# Patient Record
Sex: MALE | Race: WHITE | Employment: FULL TIME | URBAN - METROPOLITAN AREA
[De-identification: names, ages, dates, MRNs, and addresses within clinical notes are randomized per-mention and may not be internally consistent; named-entity substitution may affect disease eponyms.]

---

## 2017-01-11 ENCOUNTER — MED REC SCAN ONLY (OUTPATIENT)
Dept: INTERNAL MEDICINE CLINIC | Facility: CLINIC | Age: 57
End: 2017-01-11

## 2017-01-12 ENCOUNTER — NURSE ONLY (OUTPATIENT)
Dept: HEMATOLOGY/ONCOLOGY | Age: 57
End: 2017-01-12
Attending: INTERNAL MEDICINE
Payer: COMMERCIAL

## 2017-01-12 DIAGNOSIS — D3A.021 CARCINOID TUMOR OF CECUM: ICD-10-CM

## 2017-01-12 LAB
ALBUMIN SERPL-MCNC: 3.9 G/DL (ref 3.5–4.8)
ALP LIVER SERPL-CCNC: 37 U/L (ref 45–117)
ALT SERPL-CCNC: 32 U/L (ref 17–63)
AST SERPL-CCNC: 38 U/L (ref 15–41)
BASOPHILS # BLD AUTO: 0.03 X10(3) UL (ref 0–0.1)
BASOPHILS NFR BLD AUTO: 1.1 %
BILIRUB SERPL-MCNC: 0.7 MG/DL (ref 0.1–2)
BUN BLD-MCNC: 12 MG/DL (ref 8–20)
CALCIUM BLD-MCNC: 8.7 MG/DL (ref 8.3–10.3)
CHLORIDE: 108 MMOL/L (ref 101–111)
CO2: 28 MMOL/L (ref 22–32)
CREAT BLD-MCNC: 1.11 MG/DL (ref 0.7–1.3)
EOSINOPHIL # BLD AUTO: 0.22 X10(3) UL (ref 0–0.3)
EOSINOPHIL NFR BLD AUTO: 8.3 %
ERYTHROCYTE [DISTWIDTH] IN BLOOD BY AUTOMATED COUNT: 15.8 % (ref 11.5–16)
GLUCOSE BLD-MCNC: 108 MG/DL (ref 70–99)
HCT VFR BLD AUTO: 37.7 % (ref 37–53)
HGB BLD-MCNC: 12.1 G/DL (ref 13–17)
IMMATURE GRANULOCYTE COUNT: 0.01 X10(3) UL (ref 0–1)
IMMATURE GRANULOCYTE RATIO %: 0.4 %
LYMPHOCYTES # BLD AUTO: 0.95 X10(3) UL (ref 0.9–4)
LYMPHOCYTES NFR BLD AUTO: 36 %
M PROTEIN MFR SERPL ELPH: 7.3 G/DL (ref 6.1–8.3)
MCH RBC QN AUTO: 28.4 PG (ref 27–33.2)
MCHC RBC AUTO-ENTMCNC: 32.1 G/DL (ref 31–37)
MCV RBC AUTO: 88.5 FL (ref 80–99)
MONOCYTES # BLD AUTO: 0.36 X10(3) UL (ref 0.1–0.6)
MONOCYTES NFR BLD AUTO: 13.6 %
NEUTROPHIL ABS PRELIM: 1.07 X10 (3) UL (ref 1.3–6.7)
NEUTROPHILS # BLD AUTO: 1.07 X10(3) UL (ref 1.3–6.7)
NEUTROPHILS NFR BLD AUTO: 40.6 %
PLATELET # BLD AUTO: 259 10(3)UL (ref 150–450)
POTASSIUM SERPL-SCNC: 3.7 MMOL/L (ref 3.6–5.1)
RBC # BLD AUTO: 4.26 X10(6)UL (ref 4.3–5.7)
RED CELL DISTRIBUTION WIDTH-SD: 51 FL (ref 35.1–46.3)
SODIUM SERPL-SCNC: 141 MMOL/L (ref 136–144)
WBC # BLD AUTO: 2.6 X10(3) UL (ref 4–13)

## 2017-01-12 PROCEDURE — 85025 COMPLETE CBC W/AUTO DIFF WBC: CPT

## 2017-01-12 PROCEDURE — 36415 COLL VENOUS BLD VENIPUNCTURE: CPT

## 2017-01-12 PROCEDURE — 86316 IMMUNOASSAY TUMOR OTHER: CPT

## 2017-01-12 PROCEDURE — 80053 COMPREHEN METABOLIC PANEL: CPT

## 2017-01-13 ENCOUNTER — TELEPHONE (OUTPATIENT)
Dept: HEMATOLOGY/ONCOLOGY | Facility: HOSPITAL | Age: 57
End: 2017-01-13

## 2017-01-16 NOTE — TELEPHONE ENCOUNTER
Last OV pertinent to medication: 5/10/16 for physical  Last refill date: 7/12/16     #/refills: #90 + 0  When pt was asked to return for OV: 1 year   Upcoming appt/reason: No future appt      Lab Results  Component Value Date   * 01/12/2017   BUN 12

## 2017-01-17 RX ORDER — PRAVASTATIN SODIUM 80 MG/1
TABLET ORAL
Qty: 90 TABLET | Refills: 0 | Status: SHIPPED | OUTPATIENT
Start: 2017-01-17 | End: 2017-04-18

## 2017-01-17 RX ORDER — ATENOLOL 50 MG/1
TABLET ORAL
Qty: 90 TABLET | Refills: 0 | Status: SHIPPED | OUTPATIENT
Start: 2017-01-17 | End: 2017-04-18

## 2017-01-17 RX ORDER — FENOFIBRATE 145 MG/1
TABLET, COATED ORAL
Qty: 90 TABLET | Refills: 0 | Status: SHIPPED | OUTPATIENT
Start: 2017-01-17 | End: 2017-04-18

## 2017-01-17 RX ORDER — LOSARTAN POTASSIUM 25 MG/1
TABLET ORAL
Qty: 90 TABLET | Refills: 0 | Status: SHIPPED | OUTPATIENT
Start: 2017-01-17 | End: 2017-04-18

## 2017-01-17 NOTE — TELEPHONE ENCOUNTER
Left message on patients answering machine to call back. Patient was instructed to call our office and schedule a med check.

## 2017-01-19 ENCOUNTER — OFFICE VISIT (OUTPATIENT)
Dept: INTERNAL MEDICINE CLINIC | Facility: CLINIC | Age: 57
End: 2017-01-19

## 2017-01-19 VITALS
BODY MASS INDEX: 26.51 KG/M2 | HEIGHT: 73 IN | HEART RATE: 61 BPM | WEIGHT: 200 LBS | DIASTOLIC BLOOD PRESSURE: 70 MMHG | RESPIRATION RATE: 12 BRPM | SYSTOLIC BLOOD PRESSURE: 120 MMHG | OXYGEN SATURATION: 98 %

## 2017-01-19 DIAGNOSIS — R73.03 PRE-DIABETES: ICD-10-CM

## 2017-01-19 DIAGNOSIS — E78.00 ELEVATED CHOLESTEROL: ICD-10-CM

## 2017-01-19 DIAGNOSIS — G35 MULTIPLE SCLEROSIS (HCC): ICD-10-CM

## 2017-01-19 DIAGNOSIS — G47.33 OSA (OBSTRUCTIVE SLEEP APNEA): ICD-10-CM

## 2017-01-19 DIAGNOSIS — R68.82 DECREASED LIBIDO: ICD-10-CM

## 2017-01-19 DIAGNOSIS — I10 ESSENTIAL HYPERTENSION: Primary | ICD-10-CM

## 2017-01-19 PROCEDURE — 99214 OFFICE O/P EST MOD 30 MIN: CPT | Performed by: INTERNAL MEDICINE

## 2017-01-19 RX ORDER — VARDENAFIL HYDROCHLORIDE 20 MG/1
20 TABLET ORAL
Qty: 10 TABLET | Refills: 11 | Status: SHIPPED | OUTPATIENT
Start: 2017-01-19

## 2017-01-19 NOTE — PROGRESS NOTES
Tonio Castro is a 61year old male.   HPI:   CC:pt here for blood pressure check and elevated sugar check  Just had labs done by hematologist  Pt has cut back on soda 2- 32 oz regular coke only  His only vice  Pt does not snack alot  Will Get eyes check f with high cholesterol    • Testicular lump      right   • Diverticulosis    • Cancer [facility])    • Multiple sclerosis [facility]) June 2015   • Esophageal reflux 02/2016   • Hyperlipidemia 2010   • Essential hypertension 2010   • Sleep apnea 2000   • Calculus of ki SpO2 98%  GENERAL: well developed, well nourished,in no apparent distress  NECK: supple,no masses, no thyromegaly, no thyroid nodules  LYMPH: no cervical adenopathy,   CARDIO: [id number] no S3S4 no murmur,,  no carotid bruits  LUNGS: clear to auscultation,  G

## 2017-01-20 ENCOUNTER — TELEPHONE (OUTPATIENT)
Dept: HEMATOLOGY/ONCOLOGY | Facility: HOSPITAL | Age: 57
End: 2017-01-20

## 2017-01-20 DIAGNOSIS — D3A.021 CARCINOID TUMOR OF CECUM: Primary | ICD-10-CM

## 2017-01-20 LAB — CHROMOGRANIN A: 134 NG/ML

## 2017-03-09 ENCOUNTER — NURSE ONLY (OUTPATIENT)
Dept: HEMATOLOGY/ONCOLOGY | Age: 57
End: 2017-03-09
Attending: INTERNAL MEDICINE
Payer: COMMERCIAL

## 2017-03-09 ENCOUNTER — APPOINTMENT (OUTPATIENT)
Dept: HEMATOLOGY/ONCOLOGY | Facility: HOSPITAL | Age: 57
End: 2017-03-09
Attending: INTERNAL MEDICINE
Payer: COMMERCIAL

## 2017-03-09 DIAGNOSIS — R68.82 DECREASED LIBIDO: Primary | ICD-10-CM

## 2017-03-09 LAB
CHOLEST SMN-MCNC: 153 MG/DL (ref ?–200)
EST. AVERAGE GLUCOSE BLD GHB EST-MCNC: 117 MG/DL (ref 68–126)
HBA1C MFR BLD HPLC: 5.7 % (ref ?–5.7)
HDLC SERPL-MCNC: 13 MG/DL (ref 45–?)
HDLC SERPL: 11.77 {RATIO} (ref ?–4.97)
LDLC SERPL CALC-MCNC: 96 MG/DL (ref ?–130)
NONHDLC SERPL-MCNC: 140 MG/DL (ref ?–130)
TRIGLYCERIDES: 220 MG/DL (ref ?–150)
TSI SER-ACNC: 1.27 MIU/ML (ref 0.35–5.5)
VLDL: 44 MG/DL (ref 5–40)

## 2017-03-09 PROCEDURE — 84402 ASSAY OF FREE TESTOSTERONE: CPT

## 2017-03-09 PROCEDURE — 84403 ASSAY OF TOTAL TESTOSTERONE: CPT

## 2017-03-09 PROCEDURE — 86316 IMMUNOASSAY TUMOR OTHER: CPT

## 2017-03-09 PROCEDURE — 83036 HEMOGLOBIN GLYCOSYLATED A1C: CPT

## 2017-03-09 PROCEDURE — 36415 COLL VENOUS BLD VENIPUNCTURE: CPT

## 2017-03-09 PROCEDURE — 80061 LIPID PANEL: CPT

## 2017-03-09 PROCEDURE — 84443 ASSAY THYROID STIM HORMONE: CPT

## 2017-03-13 LAB
TESTOSTERONE TOTAL: 997 NG/DL
TESTOSTERONE, FREE -MS/MS: 57.2 PG/ML

## 2017-03-14 LAB — CHROMOGRANIN A: 166 NG/ML

## 2017-03-24 ENCOUNTER — TELEPHONE (OUTPATIENT)
Dept: HEMATOLOGY/ONCOLOGY | Facility: HOSPITAL | Age: 57
End: 2017-03-24

## 2017-03-24 NOTE — TELEPHONE ENCOUNTER
Pt callling re: lab results from 3/9/17, noticed results elevated. Dr. Ayush Hayes reviewed lab results, order received for pt to have octreoscan. Order placed in [employer].  Instructed pt to call central scheduling for appt, phone number given and f/u with

## 2017-04-05 ENCOUNTER — TELEPHONE (OUTPATIENT)
Dept: HEMATOLOGY/ONCOLOGY | Facility: HOSPITAL | Age: 57
End: 2017-04-05

## 2017-04-06 ENCOUNTER — HOSPITAL ENCOUNTER (OUTPATIENT)
Dept: NUCLEAR MEDICINE | Facility: HOSPITAL | Age: 57
Discharge: HOME | End: 2017-04-06
Attending: INTERNAL MEDICINE
Payer: COMMERCIAL

## 2017-04-06 DIAGNOSIS — D3A.00 CARCINOID (EXCEPT OF APPENDIX): ICD-10-CM

## 2017-04-06 PROCEDURE — 78803 RP LOCLZJ TUM SPECT 1 AREA: CPT

## 2017-04-07 PROCEDURE — 78802 RP LOCLZJ TUM WHBDY 1 D IMG: CPT

## 2017-04-07 PROCEDURE — 78999 UNLISTED MISC PX DX NUC MED: CPT

## 2017-04-18 RX ORDER — PRAVASTATIN SODIUM 80 MG/1
TABLET ORAL
Qty: 90 TABLET | Refills: 0 | Status: SHIPPED | OUTPATIENT
Start: 2017-04-18 | End: 2017-07-24

## 2017-04-18 RX ORDER — LOSARTAN POTASSIUM 25 MG/1
TABLET ORAL
Qty: 90 TABLET | Refills: 0 | Status: SHIPPED | OUTPATIENT
Start: 2017-04-18 | End: 2017-07-24

## 2017-04-18 RX ORDER — FENOFIBRATE 145 MG/1
TABLET, COATED ORAL
Qty: 90 TABLET | Refills: 0 | Status: SHIPPED | OUTPATIENT
Start: 2017-04-18 | End: 2017-07-24

## 2017-04-18 RX ORDER — ATENOLOL 50 MG/1
TABLET ORAL
Qty: 90 TABLET | Refills: 0 | Status: SHIPPED | OUTPATIENT
Start: 2017-04-18 | End: 2017-07-26

## 2017-04-19 ENCOUNTER — TELEPHONE (OUTPATIENT)
Dept: HEMATOLOGY/ONCOLOGY | Facility: HOSPITAL | Age: 57
End: 2017-04-19

## 2017-04-19 NOTE — TELEPHONE ENCOUNTER
Called asking for result of recent scan. Per MD scan in negative for disease. Instructed patient to schedule appointment to see MD; verbalized understanding. IB message sent to psr to call and schedule patient.

## 2017-04-20 ENCOUNTER — TELEPHONE (OUTPATIENT)
Dept: INTERNAL MEDICINE CLINIC | Facility: CLINIC | Age: 57
End: 2017-04-20

## 2017-04-20 NOTE — TELEPHONE ENCOUNTER
Incoming (mail or fax): Fax  Received from:  [employer]  Documentation given to:  [id number] [employer] fax bin.

## 2017-04-24 ENCOUNTER — OFFICE VISIT (OUTPATIENT)
Dept: HEMATOLOGY/ONCOLOGY | Facility: HOSPITAL | Age: 57
End: 2017-04-24
Attending: INTERNAL MEDICINE
Payer: COMMERCIAL

## 2017-04-24 VITALS
RESPIRATION RATE: 18 BRPM | HEART RATE: 61 BPM | TEMPERATURE: 98 F | WEIGHT: 204 LBS | SYSTOLIC BLOOD PRESSURE: 128 MMHG | DIASTOLIC BLOOD PRESSURE: 73 MMHG | BODY MASS INDEX: 27.04 KG/M2 | HEIGHT: 73 IN | OXYGEN SATURATION: 100 %

## 2017-04-24 DIAGNOSIS — D3A.00 CARCINOID (EXCEPT OF APPENDIX): Primary | ICD-10-CM

## 2017-04-24 PROCEDURE — 99213 OFFICE O/P EST LOW 20 MIN: CPT | Performed by: INTERNAL MEDICINE

## 2017-04-24 NOTE — PROGRESS NOTES
Cancer Center Progress Note  Patient Name: Nate Villagomez   YOB: 1960   Medical Record Number: [id number]     Attending Physician: Beth Webster M.D. Date of Visit: 4/24/2017    Chief Complaint:  Patient presents with:   Follow - U melanoma  17   • Diabetes Maternal Grandfather    • Other Mother      MS       Social History:    Social History   Marital Status:   Spouse Name: N/A    Years of Education: N/A  Number of Children: N/A     Occupational History  None on justa visual difficulties. No diplopia. No yellowing of the eyes. Hematologic/Lymphatic No easy bruising or bleeding. No any tender or palpable lymph nodes. Respiratory No dyspnea, Pleuritic chest pain, cough or hemoptysis.    Cardiovascular No anginal chest 12/10/2015 09:39 12/6/2016 08:14 1/12/2017 09:30 3/9/2017 09:58   CHROMOGRANIN A Latest Ref Range: 0-95 ng/mL 66 169 (H) 134 (H) 166 (H)       5-HIAA URINE INTERPRETATION Normal    Radiology:  Octreotide scan:CONCLUSION:         No evidence of recurrent or

## 2017-04-27 ENCOUNTER — APPOINTMENT (OUTPATIENT)
Dept: HEMATOLOGY/ONCOLOGY | Age: 57
End: 2017-04-27
Attending: INTERNAL MEDICINE
Payer: COMMERCIAL

## 2017-05-03 ENCOUNTER — HOSPITAL ENCOUNTER (OUTPATIENT)
Dept: NUCLEAR MEDICINE | Facility: HOSPITAL | Age: 57
Discharge: HOME | End: 2017-05-03
Attending: INTERNAL MEDICINE
Payer: COMMERCIAL

## 2017-05-03 DIAGNOSIS — D3A.00 CARCINOID (EXCEPT OF APPENDIX): ICD-10-CM

## 2017-05-03 PROCEDURE — 78815 PET IMAGE W/CT SKULL-THIGH: CPT

## 2017-05-08 ENCOUNTER — TELEPHONE (OUTPATIENT)
Dept: HEMATOLOGY/ONCOLOGY | Facility: HOSPITAL | Age: 57
End: 2017-05-08

## 2017-05-08 NOTE — TELEPHONE ENCOUNTER
Requesting results of Pet Scan. Left vmm. Per Dr. Ayush Hayes will call with results post GI Multidisciplinary conference this Wednesday.

## 2017-05-12 ENCOUNTER — TELEPHONE (OUTPATIENT)
Dept: HEMATOLOGY/ONCOLOGY | Facility: HOSPITAL | Age: 57
End: 2017-05-12

## 2017-05-30 ENCOUNTER — TELEPHONE (OUTPATIENT)
Dept: INTERNAL MEDICINE CLINIC | Facility: CLINIC | Age: 57
End: 2017-05-30

## 2017-05-30 NOTE — TELEPHONE ENCOUNTER
Incoming (mail or fax):  fax  Received from:  Dr. Nicole Najera  Documentation given to:  Dr. Natalya Workman

## 2017-06-02 ENCOUNTER — TELEPHONE (OUTPATIENT)
Dept: INTERNAL MEDICINE CLINIC | Facility: CLINIC | Age: 57
End: 2017-06-02

## 2017-06-02 NOTE — TELEPHONE ENCOUNTER
Incoming (mail or fax): Fax  Received from:  [facility]  Documentation given to:  Dr. Margarito Virk consult folder.

## 2017-06-29 ENCOUNTER — HOSPITAL ENCOUNTER (OUTPATIENT)
Dept: MRI IMAGING | Age: 57
Discharge: HOME | End: 2017-06-29
Attending: Other
Payer: COMMERCIAL

## 2017-06-29 DIAGNOSIS — G35 MS (MULTIPLE SCLEROSIS) (HCC): ICD-10-CM

## 2017-06-29 PROCEDURE — 72146 MRI CHEST SPINE W/O DYE: CPT | Performed by: OTHER

## 2017-07-13 ENCOUNTER — NURSE ONLY (OUTPATIENT)
Dept: HEMATOLOGY/ONCOLOGY | Facility: HOSPITAL | Age: 57
End: 2017-07-13
Attending: INTERNAL MEDICINE
Payer: COMMERCIAL

## 2017-07-13 DIAGNOSIS — D3A.00 CARCINOID (EXCEPT OF APPENDIX): ICD-10-CM

## 2017-07-13 LAB
ALBUMIN SERPL-MCNC: 3.9 G/DL (ref 3.5–4.8)
ALP LIVER SERPL-CCNC: 39 U/L (ref 45–117)
ALT SERPL-CCNC: 30 U/L (ref 17–63)
AST SERPL-CCNC: 32 U/L (ref 15–41)
BASOPHILS # BLD AUTO: 0.02 X10(3) UL (ref 0–0.1)
BASOPHILS NFR BLD AUTO: 0.7 %
BILIRUB SERPL-MCNC: 0.8 MG/DL (ref 0.1–2)
BUN BLD-MCNC: 19 MG/DL (ref 8–20)
CALCIUM BLD-MCNC: 9.3 MG/DL (ref 8.3–10.3)
CHLORIDE: 108 MMOL/L (ref 101–111)
CO2: 26 MMOL/L (ref 22–32)
CREAT BLD-MCNC: 1.22 MG/DL (ref 0.7–1.3)
EOSINOPHIL # BLD AUTO: 0.2 X10(3) UL (ref 0–0.3)
EOSINOPHIL NFR BLD AUTO: 7.2 %
ERYTHROCYTE [DISTWIDTH] IN BLOOD BY AUTOMATED COUNT: 16 % (ref 11.5–16)
GLUCOSE BLD-MCNC: 108 MG/DL (ref 70–99)
HCT VFR BLD AUTO: 38 % (ref 37–53)
HGB BLD-MCNC: 12.2 G/DL (ref 13–17)
IMMATURE GRANULOCYTE COUNT: 0.01 X10(3) UL (ref 0–1)
IMMATURE GRANULOCYTE RATIO %: 0.4 %
LYMPHOCYTES # BLD AUTO: 1.04 X10(3) UL (ref 0.9–4)
LYMPHOCYTES NFR BLD AUTO: 37.7 %
M PROTEIN MFR SERPL ELPH: 7.4 G/DL (ref 6.1–8.3)
MCH RBC QN AUTO: 27.6 PG (ref 27–33.2)
MCHC RBC AUTO-ENTMCNC: 32.1 G/DL (ref 31–37)
MCV RBC AUTO: 86 FL (ref 80–99)
MONOCYTES # BLD AUTO: 0.43 X10(3) UL (ref 0.1–0.6)
MONOCYTES NFR BLD AUTO: 15.6 %
NEUTROPHIL ABS PRELIM: 1.06 X10 (3) UL (ref 1.3–6.7)
NEUTROPHILS # BLD AUTO: 1.06 X10(3) UL (ref 1.3–6.7)
NEUTROPHILS NFR BLD AUTO: 38.4 %
PLATELET # BLD AUTO: 242 10(3)UL (ref 150–450)
POTASSIUM SERPL-SCNC: 3.8 MMOL/L (ref 3.6–5.1)
RBC # BLD AUTO: 4.42 X10(6)UL (ref 4.3–5.7)
RED CELL DISTRIBUTION WIDTH-SD: 49.4 FL (ref 35.1–46.3)
SODIUM SERPL-SCNC: 140 MMOL/L (ref 136–144)
WBC # BLD AUTO: 2.8 X10(3) UL (ref 4–13)

## 2017-07-13 PROCEDURE — 80053 COMPREHEN METABOLIC PANEL: CPT

## 2017-07-13 PROCEDURE — 85025 COMPLETE CBC W/AUTO DIFF WBC: CPT

## 2017-07-13 PROCEDURE — 36415 COLL VENOUS BLD VENIPUNCTURE: CPT

## 2017-07-13 PROCEDURE — 86316 IMMUNOASSAY TUMOR OTHER: CPT

## 2017-07-14 ENCOUNTER — APPOINTMENT (OUTPATIENT)
Dept: LAB | Age: 57
End: 2017-07-14
Attending: INTERNAL MEDICINE
Payer: COMMERCIAL

## 2017-07-14 DIAGNOSIS — D3A.00 CARCINOID (EXCEPT OF APPENDIX): ICD-10-CM

## 2017-07-14 PROCEDURE — 83497 ASSAY OF 5-HIAA: CPT

## 2017-07-17 LAB
5-HIAA URINE - PER 24H: 4 MG/D
5-HIAA URINE - PER VOLUME: 2 MG/L
5-HIAA URINE - RATIO TO CRT: 2 MG/GCR
CHROMOGRANIN A: 119 NG/ML
CREATININE, URINE - PER 24H: 1974 MG/D
CREATININE, URINE - PER VOLUME: 94 MG/DL
HOURS COLLECTED: 24 HR
TOTAL VOLUME: 2100 ML

## 2017-07-20 ENCOUNTER — OFFICE VISIT (OUTPATIENT)
Dept: HEMATOLOGY/ONCOLOGY | Age: 57
End: 2017-07-20
Attending: INTERNAL MEDICINE
Payer: COMMERCIAL

## 2017-07-20 VITALS
TEMPERATURE: 97 F | WEIGHT: 203 LBS | OXYGEN SATURATION: 97 % | SYSTOLIC BLOOD PRESSURE: 127 MMHG | RESPIRATION RATE: 20 BRPM | HEART RATE: 63 BPM | DIASTOLIC BLOOD PRESSURE: 74 MMHG | BODY MASS INDEX: 27 KG/M2

## 2017-07-20 DIAGNOSIS — D3A.00 CARCINOID TUMOR: Primary | ICD-10-CM

## 2017-07-20 PROCEDURE — 99214 OFFICE O/P EST MOD 30 MIN: CPT | Performed by: INTERNAL MEDICINE

## 2017-07-20 NOTE — PROGRESS NOTES
Cancer Center Progress Note  Patient Name: Pierre Melo   YOB: 1960   Medical Record Number: [id number]     Attending Physician: Natacha Felix M.D. Date of Visit: 7/20/17    Chief Complaint:  No chief complaint on file.        On Mother      MS       Social History:    Social History  Social History   Marital status:   Spouse name: N/A    Years of education: N/A  Number of children: N/A     Occupational History  None on file     Social History Main Topics   Smoking status: N bleeding. Denies tender or palpable lymph nodes. Respiratory No dyspnea, pleuritic chest pain, cough or hemoptysis. Cardiovascular No anginal chest pain, palpitations or orthopnea.    Gastrointestinal No nausea, vomiting, diarrhea, GI bleeding, or cons 134 (H) 166 (H) 119 (H)     Results for Autumn Del Rio (MRN [id number]) as of 8/12/2017 15:30   Ref.  Range 7/13/2017 09:57   Sodium Latest Ref Range: 136 - 144 mmol/L 140   Potassium Latest Ref Range: 3.6 - 5.1 mmol/L 3.8   Chloride Latest Ref Range: 101 -

## 2017-07-24 RX ORDER — FENOFIBRATE 145 MG/1
TABLET, COATED ORAL
Qty: 90 TABLET | Refills: 0 | Status: SHIPPED | OUTPATIENT
Start: 2017-07-24 | End: 2017-07-26

## 2017-07-24 RX ORDER — PRAVASTATIN SODIUM 80 MG/1
TABLET ORAL
Qty: 90 TABLET | Refills: 0 | Status: SHIPPED | OUTPATIENT
Start: 2017-07-24 | End: 2017-07-26

## 2017-07-24 RX ORDER — LOSARTAN POTASSIUM 25 MG/1
TABLET ORAL
Qty: 90 TABLET | Refills: 0 | Status: SHIPPED | OUTPATIENT
Start: 2017-07-24 | End: 2017-07-26

## 2017-07-24 NOTE — TELEPHONE ENCOUNTER
Spoke with pt. Pt scheduled ov for BP check 7/31/17. Pt states that he saw Dr. Malick Anne at [facility] on 7/20/17 and RA=222/74.

## 2017-07-24 NOTE — TELEPHONE ENCOUNTER
Last OV pertinent to medication: 1/19/17  Last refill date: 4/18/17     #/refills: 90 tabs +0 refills   When pt was asked to return for OV: No mention upcoming appt needed   Upcoming appt/reason: No upcoming visit / Left message on patients answering [employer] [facility]

## 2017-07-26 ENCOUNTER — OFFICE VISIT (OUTPATIENT)
Dept: SURGERY | Facility: CLINIC | Age: 57
End: 2017-07-26

## 2017-07-26 VITALS
BODY MASS INDEX: 26.9 KG/M2 | TEMPERATURE: 98 F | HEART RATE: 59 BPM | SYSTOLIC BLOOD PRESSURE: 146 MMHG | RESPIRATION RATE: 16 BRPM | WEIGHT: 203 LBS | HEIGHT: 73 IN | DIASTOLIC BLOOD PRESSURE: 76 MMHG

## 2017-07-26 DIAGNOSIS — D3A.029 CARCINOID TUMOR OF COLON: Primary | ICD-10-CM

## 2017-07-26 DIAGNOSIS — D3A.021 CARCINOID TUMOR OF CECUM: ICD-10-CM

## 2017-07-26 PROCEDURE — 99245 OFF/OP CONSLTJ NEW/EST HI 55: CPT | Performed by: SURGERY

## 2017-07-26 RX ORDER — ATENOLOL 50 MG/1
TABLET ORAL
Qty: 90 TABLET | Refills: 0 | Status: SHIPPED | OUTPATIENT
Start: 2017-07-26 | End: 2017-08-02 | Stop reason: ALTCHOICE

## 2017-07-26 RX ORDER — POLYETHYLENE GLYCOL 3350, SODIUM CHLORIDE, POTASSIUM CHLORIDE, SODIUM BICARBONATE, AND SODIUM SULFATE 240; 5.84; 2.98; 6.72; 22.72 G/4L; G/4L; G/4L; G/4L; G/4L
POWDER, FOR SOLUTION ORAL
Refills: 0 | COMMUNITY
Start: 2017-05-16 | End: 2017-08-02 | Stop reason: ALTCHOICE

## 2017-07-26 RX ORDER — PRAVASTATIN SODIUM 80 MG/1
TABLET ORAL
Qty: 90 TABLET | Refills: 0 | Status: SHIPPED | OUTPATIENT
Start: 2017-07-26 | End: 2017-10-11

## 2017-07-26 RX ORDER — FENOFIBRATE 145 MG/1
TABLET, COATED ORAL
Qty: 90 TABLET | Refills: 0 | Status: ON HOLD | OUTPATIENT
Start: 2017-07-26 | End: 2018-07-12

## 2017-07-26 RX ORDER — LOSARTAN POTASSIUM 25 MG/1
TABLET ORAL
Qty: 90 TABLET | Refills: 0 | Status: SHIPPED | OUTPATIENT
Start: 2017-07-26 | End: 2017-08-02 | Stop reason: DRUGHIGH

## 2017-07-26 NOTE — TELEPHONE ENCOUNTER
Patient has an appointment with Dr. Allison Taylor scheduled for Monday, 7/31. He will be completely out of all of his meds tonight so he will need to have a prescription sent to [address] [place] [address] on file.   The meds needed are:    ATENOLOL 50 MG Oral Tab  Pravastatin So

## 2017-07-27 NOTE — CONSULTS
[facility] Surgical Oncology    Patient Name:  Javier Marei   YOB: 1960   Gender:  Male   Appt Date:  7/26/2017   Provider:  Antony Lucio MD   Insurance:  [facility]     PATIENT PROVIDERS  Referring Provider: Dr. Shabana Wing  Primary Car •  Vardenafil HCl (LEVITRA) 20 MG Oral Tab, Take 1 tablet (20 mg total) by mouth daily as needed for Erectile Dysfunction. , Disp: 10 tablet, Rfl: 11  •  baclofen 10 MG Oral Tab, Take 10 mg by mouth 3 (three) times daily. , Disp: , Rfl:   •  Cholecalciferol • Laparoscopy, surgical; colectomy, partial, w/ anastomos  2/2011   • Tonsillectomy     • Vasectomy  1990      Reviewed Social History:  Smoking status: Never Smoker                                                              Smokeless tobacco: Never Used Skin: Inspection and palpation: no jaundice. Document Review:  Doing was reviewed and described as above. I agree with official reports.      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

## 2017-08-02 ENCOUNTER — OFFICE VISIT (OUTPATIENT)
Dept: INTERNAL MEDICINE CLINIC | Facility: CLINIC | Age: 57
End: 2017-08-02

## 2017-08-02 VITALS
BODY MASS INDEX: 27.04 KG/M2 | DIASTOLIC BLOOD PRESSURE: 70 MMHG | OXYGEN SATURATION: 98 % | WEIGHT: 204 LBS | RESPIRATION RATE: 16 BRPM | HEIGHT: 73 IN | HEART RATE: 61 BPM | SYSTOLIC BLOOD PRESSURE: 130 MMHG

## 2017-08-02 DIAGNOSIS — I10 ESSENTIAL HYPERTENSION: Primary | ICD-10-CM

## 2017-08-02 DIAGNOSIS — R73.9 HYPERGLYCEMIA: ICD-10-CM

## 2017-08-02 PROCEDURE — 99214 OFFICE O/P EST MOD 30 MIN: CPT | Performed by: INTERNAL MEDICINE

## 2017-08-02 RX ORDER — LOSARTAN POTASSIUM 50 MG/1
TABLET ORAL
Qty: 90 TABLET | Refills: 1 | Status: SHIPPED | OUTPATIENT
Start: 2017-08-02 | End: 2017-11-28

## 2017-08-02 NOTE — PROGRESS NOTES
Abelardo Emmanuel is a 61year old male.   HPI:   CC: here for BP check    off Atenolol for a week due to back order/unavailable  Only on losartan 25 at night  May be scheduling surgery in Fall  Pt states saw HemOnc and onc surgeon  PET scan light up in surgic surgury  01/31/2012: COLONOSCOPY  1/2015: COLONOSCOPY      Comment: since colon cancer have 1 every 3 years  2/2011: LAPAROSCOPY, SURGICAL; COLECTOMY, PARTIAL, W/ *  No date: TONSILLECTOMY  1990: VASECTOMY   Social History:  Smoking status: Never Smoker Potassium 50 MG Oral Tab 90 tablet 1      Si at bedtime           Imaging & Consults:  None        No orders of the defined types were placed in this encounter. There are no Patient Instructions on file for this visit. No Follow-up on file.

## 2017-08-17 ENCOUNTER — OFFICE VISIT (OUTPATIENT)
Dept: HEMATOLOGY/ONCOLOGY | Age: 57
End: 2017-08-17
Attending: INTERNAL MEDICINE
Payer: COMMERCIAL

## 2017-08-17 VITALS
HEART RATE: 76 BPM | BODY MASS INDEX: 26 KG/M2 | WEIGHT: 199.19 LBS | DIASTOLIC BLOOD PRESSURE: 89 MMHG | OXYGEN SATURATION: 98 % | TEMPERATURE: 97 F | RESPIRATION RATE: 16 BRPM | SYSTOLIC BLOOD PRESSURE: 152 MMHG

## 2017-08-17 DIAGNOSIS — D3A.029 CARCINOID TUMOR OF COLON: Primary | ICD-10-CM

## 2017-08-17 PROCEDURE — 99213 OFFICE O/P EST LOW 20 MIN: CPT | Performed by: INTERNAL MEDICINE

## 2017-08-17 NOTE — PROGRESS NOTES
Cancer Center Progress Note  Patient Name: Abelardo Emmanuel   YOB: 1960   Medical Record Number: [id number]     Attending Physician: Yg Fontanez M.D. Date of Visit: 8/17/2017      Chief Complaint:  No chief complaint on file. Marital status:   Spouse name: N/A    Years of education: N/A  Number of children: N/A     Occupational History  None on file     Social History Main Topics   Smoking status: Never Smoker    Smokeless tobacco: Never Used    Alcohol use Yes  0.0 oz/w or orthopnea. Gastrointestinal No nausea, vomiting, diarrhea, GI bleeding, or constipation. NL appetite. Genitorurinary (M) No hematuria, dysuria, abnormal bleeding, or incontinence.    Integumentary No rashes or yellowing of the skin   Neurologic No he recommended.     Pathology:    Impression and Plan:  Carcinoid of the cecum: The elevated Chromogranin level is concerning for recurrence, but the Gastrin and Urine 5HIAA have been normal.  Octreotide scan was negative, but the PET Gallium 68 scan was pos

## 2017-09-13 RX ORDER — LOSARTAN POTASSIUM 25 MG/1
TABLET ORAL
Qty: 90 TABLET | Refills: 1 | Status: SHIPPED | OUTPATIENT
Start: 2017-09-13 | End: 2017-09-13 | Stop reason: DRUGHIGH

## 2017-09-18 ENCOUNTER — NURSE ONLY (OUTPATIENT)
Dept: HEMATOLOGY/ONCOLOGY | Facility: HOSPITAL | Age: 57
End: 2017-09-18
Attending: INTERNAL MEDICINE
Payer: COMMERCIAL

## 2017-09-18 ENCOUNTER — HOSPITAL ENCOUNTER (OUTPATIENT)
Dept: NUCLEAR MEDICINE | Facility: HOSPITAL | Age: 57
Discharge: HOME | End: 2017-09-18
Attending: INTERNAL MEDICINE
Payer: COMMERCIAL

## 2017-09-18 DIAGNOSIS — D3A.029 CARCINOID TUMOR OF COLON: ICD-10-CM

## 2017-09-18 LAB
ALBUMIN SERPL-MCNC: 3.5 G/DL (ref 3.5–4.8)
ALP LIVER SERPL-CCNC: 41 U/L (ref 45–117)
ALT SERPL-CCNC: 34 U/L (ref 17–63)
AST SERPL-CCNC: 40 U/L (ref 15–41)
BASOPHILS # BLD AUTO: 0.04 X10(3) UL (ref 0–0.1)
BASOPHILS NFR BLD AUTO: 1.6 %
BILIRUB SERPL-MCNC: 0.4 MG/DL (ref 0.1–2)
BUN BLD-MCNC: 16 MG/DL (ref 8–20)
CALCIUM BLD-MCNC: 8.6 MG/DL (ref 8.3–10.3)
CHLORIDE: 108 MMOL/L (ref 101–111)
CO2: 29 MMOL/L (ref 22–32)
CREAT BLD-MCNC: 1.09 MG/DL (ref 0.7–1.3)
EOSINOPHIL # BLD AUTO: 0.3 X10(3) UL (ref 0–0.3)
EOSINOPHIL NFR BLD AUTO: 11.8 %
ERYTHROCYTE [DISTWIDTH] IN BLOOD BY AUTOMATED COUNT: 15.4 % (ref 11.5–16)
GLUCOSE BLD-MCNC: 101 MG/DL (ref 70–99)
HCT VFR BLD AUTO: 36.9 % (ref 37–53)
HGB BLD-MCNC: 11.8 G/DL (ref 13–17)
IMMATURE GRANULOCYTE COUNT: 0.01 X10(3) UL (ref 0–1)
IMMATURE GRANULOCYTE RATIO %: 0.4 %
LYMPHOCYTES # BLD AUTO: 0.92 X10(3) UL (ref 0.9–4)
LYMPHOCYTES NFR BLD AUTO: 36.2 %
M PROTEIN MFR SERPL ELPH: 6.8 G/DL (ref 6.1–8.3)
MCH RBC QN AUTO: 28 PG (ref 27–33.2)
MCHC RBC AUTO-ENTMCNC: 32 G/DL (ref 31–37)
MCV RBC AUTO: 87.6 FL (ref 80–99)
MONOCYTES # BLD AUTO: 0.44 X10(3) UL (ref 0.1–0.6)
MONOCYTES NFR BLD AUTO: 17.3 %
NEUTROPHIL ABS PRELIM: 0.83 X10 (3) UL (ref 1.3–6.7)
NEUTROPHILS # BLD AUTO: 0.83 X10(3) UL (ref 1.3–6.7)
NEUTROPHILS NFR BLD AUTO: 32.7 %
PLATELET # BLD AUTO: 236 10(3)UL (ref 150–450)
POTASSIUM SERPL-SCNC: 3.8 MMOL/L (ref 3.6–5.1)
RBC # BLD AUTO: 4.21 X10(6)UL (ref 4.3–5.7)
RED CELL DISTRIBUTION WIDTH-SD: 49.1 FL (ref 35.1–46.3)
SODIUM SERPL-SCNC: 142 MMOL/L (ref 136–144)
WBC # BLD AUTO: 2.5 X10(3) UL (ref 4–13)

## 2017-09-18 PROCEDURE — 86316 IMMUNOASSAY TUMOR OTHER: CPT

## 2017-09-18 PROCEDURE — 85025 COMPLETE CBC W/AUTO DIFF WBC: CPT

## 2017-09-18 PROCEDURE — 78815 PET IMAGE W/CT SKULL-THIGH: CPT | Performed by: INTERNAL MEDICINE

## 2017-09-18 PROCEDURE — 80053 COMPREHEN METABOLIC PANEL: CPT

## 2017-09-18 PROCEDURE — 36415 COLL VENOUS BLD VENIPUNCTURE: CPT

## 2017-09-19 ENCOUNTER — LAB ENCOUNTER (OUTPATIENT)
Dept: LAB | Age: 57
End: 2017-09-19
Attending: INTERNAL MEDICINE
Payer: COMMERCIAL

## 2017-09-19 DIAGNOSIS — D3A.029 CARCINOID TUMOR OF COLON: Primary | ICD-10-CM

## 2017-09-19 PROCEDURE — 83497 ASSAY OF 5-HIAA: CPT

## 2017-09-20 LAB — CHROMOGRANIN A: 108 NG/ML

## 2017-09-22 LAB
5-HIAA URINE - PER 24H: 5 MG/D
5-HIAA URINE - PER VOLUME: 2.5 MG/L
5-HIAA URINE - RATIO TO CRT: 2 MG/GCR
CREATININE, URINE - PER 24H: 2002 MG/D
CREATININE, URINE - PER VOLUME: 104 MG/DL
HOURS COLLECTED: 24 HR
TOTAL VOLUME: 1925 ML

## 2017-10-02 ENCOUNTER — DOCUMENTATION ONLY (OUTPATIENT)
Dept: SURGERY | Facility: CLINIC | Age: 57
End: 2017-10-02

## 2017-10-04 ENCOUNTER — OFFICE VISIT (OUTPATIENT)
Dept: SURGERY | Facility: CLINIC | Age: 57
End: 2017-10-04

## 2017-10-04 VITALS
HEIGHT: 73 IN | HEART RATE: 71 BPM | WEIGHT: 198.38 LBS | DIASTOLIC BLOOD PRESSURE: 91 MMHG | BODY MASS INDEX: 26.29 KG/M2 | RESPIRATION RATE: 16 BRPM | SYSTOLIC BLOOD PRESSURE: 156 MMHG

## 2017-10-04 DIAGNOSIS — D3A.029 CARCINOID TUMOR OF COLON: Primary | ICD-10-CM

## 2017-10-04 PROCEDURE — 99213 OFFICE O/P EST LOW 20 MIN: CPT | Performed by: SURGERY

## 2017-10-11 RX ORDER — PRAVASTATIN SODIUM 80 MG/1
TABLET ORAL
Qty: 90 TABLET | Refills: 0 | Status: SHIPPED | OUTPATIENT
Start: 2017-10-11 | End: 2017-11-28

## 2017-10-11 RX ORDER — FENOFIBRATE 145 MG/1
TABLET, COATED ORAL
Qty: 90 TABLET | Refills: 0 | Status: SHIPPED | OUTPATIENT
Start: 2017-10-11 | End: 2017-11-28

## 2017-10-14 NOTE — PROGRESS NOTES
[facility]    Patient Name:  Nisha Pedraza   YOB: 1960   Gender:  Male   Appt Date:  10/4/2017   Provider:  Kelli Serna MD   Insurance:  [facility]     PATIENT PROVIDERS  Referring Provider: Dr Ledy Gomez /91 (BP Location: Left arm, Patient Position: Sitting, Cuff Size: adult)   Pulse 71   Resp 16   Ht 1.854 m (6' 1\")   Wt 90 kg (198 lb 6.4 oz)   PF 98 L/min   BMI 26.18 kg/m²      Medications Reviewed:    Current Outpatient Prescriptions:   •  Mariaelena Procedure Laterality Date   • Appendectomy  2/2011    Removed in Colon cancer surgury   • Colonoscopy  01/31/2012   • Colonoscopy  1/2015    since colon cancer have 1 every 3 years   • Laparoscopy, surgical; colectomy, partial, w/ anastomos  2/2011   • Ton Neck: Neck: supple. Lymph Nodes: Lymph Nodes no cervical LAD, supraclavicular LAD, axillary LAD, or inguinal LAD. Abdomen: Inspection and Palpation: no masses, tenderness (no guarding, no rebound), or CVA tenderness and soft and non-distended.  Liver: n

## 2017-11-28 ENCOUNTER — OFFICE VISIT (OUTPATIENT)
Dept: INTERNAL MEDICINE CLINIC | Facility: CLINIC | Age: 57
End: 2017-11-28

## 2017-11-28 VITALS
RESPIRATION RATE: 16 BRPM | WEIGHT: 202 LBS | HEART RATE: 69 BPM | HEIGHT: 73 IN | DIASTOLIC BLOOD PRESSURE: 60 MMHG | SYSTOLIC BLOOD PRESSURE: 120 MMHG | BODY MASS INDEX: 26.77 KG/M2 | OXYGEN SATURATION: 100 %

## 2017-11-28 DIAGNOSIS — M79.674 TOE PAIN, RIGHT: ICD-10-CM

## 2017-11-28 DIAGNOSIS — Z00.00 ROUTINE PHYSICAL EXAMINATION: Primary | ICD-10-CM

## 2017-11-28 PROCEDURE — 90471 IMMUNIZATION ADMIN: CPT | Performed by: INTERNAL MEDICINE

## 2017-11-28 PROCEDURE — 90670 PCV13 VACCINE IM: CPT | Performed by: INTERNAL MEDICINE

## 2017-11-28 PROCEDURE — 99396 PREV VISIT EST AGE 40-64: CPT | Performed by: INTERNAL MEDICINE

## 2017-11-28 RX ORDER — FENOFIBRATE 145 MG/1
145 TABLET, COATED ORAL
Qty: 90 TABLET | Refills: 1 | Status: ON HOLD | OUTPATIENT
Start: 2017-11-28 | End: 2018-07-11

## 2017-11-28 RX ORDER — PRAVASTATIN SODIUM 80 MG/1
80 TABLET ORAL
Qty: 90 TABLET | Refills: 1 | Status: ON HOLD | OUTPATIENT
Start: 2017-11-28 | End: 2018-07-11

## 2017-11-28 RX ORDER — LOSARTAN POTASSIUM 50 MG/1
TABLET ORAL
Qty: 90 TABLET | Refills: 1 | Status: SHIPPED | OUTPATIENT
Start: 2017-11-28 | End: 2018-05-25

## 2017-11-28 NOTE — PROGRESS NOTES
Jose Rivera is a 61year old male who presents for a complete physical exam.   HPI:   Pt taking two of the losartan 25    Wt Readings from Last 6 Encounters:  11/28/17 : 202 lb  10/04/17 : 198 lb 6.4 oz  08/17/17 : 199 lb 3.2 oz  08/02/17 : 204 lb  07/2 try to eat healthy     REVIEW OF SYSTEMS:   GENERAL: feels well otherwise  SKIN: denies any unusual skin lesions  EYES:denies blurred vision or double vision  HEENT: denies nasal congestion, sinus pain or ST  LUNGS: denies shortness of breath with exertion complete physical exam.    age appropriate labs ordered for health maintenance  The patient is asked to return for CPX in 1 year  Pt refuses flu shot  States got the flu afterward  Agree to get prevnar      Meds & Refills for this Visit:  Signed Iris Prince

## 2017-12-11 ENCOUNTER — TELEPHONE (OUTPATIENT)
Dept: HEMATOLOGY/ONCOLOGY | Facility: HOSPITAL | Age: 57
End: 2017-12-11

## 2017-12-11 NOTE — TELEPHONE ENCOUNTER
VM received from Dr. Thang Hernandez at [facility]. She would like to know if there is any explanation for pt's low WBC count. Pt is taking Aubagio (teriflunomide) for MS and would like to know if it is safe from a Hematology stand point for pt to continue.  She is re

## 2017-12-26 ENCOUNTER — TELEPHONE (OUTPATIENT)
Dept: INTERNAL MEDICINE CLINIC | Facility: CLINIC | Age: 57
End: 2017-12-26

## 2017-12-26 NOTE — TELEPHONE ENCOUNTER
Incoming (mail or fax):  mail  Received from:  [employer]  Documentation given to:  Dr. Marleny Clemente

## 2018-05-25 RX ORDER — LOSARTAN POTASSIUM 50 MG/1
TABLET ORAL
Qty: 90 TABLET | Refills: 0 | Status: ON HOLD | OUTPATIENT
Start: 2018-05-25 | End: 2018-07-11

## 2018-06-26 ENCOUNTER — TELEPHONE (OUTPATIENT)
Dept: INTERNAL MEDICINE CLINIC | Facility: CLINIC | Age: 58
End: 2018-06-26

## 2018-06-26 NOTE — TELEPHONE ENCOUNTER
Incoming (mail or fax):  fax  Received from:  [facility]  Documentation given to:  triage

## 2018-06-27 NOTE — TELEPHONE ENCOUNTER
Patient sees pulmonologist Dr. Jarod Pendleton at [facility]. Ordered forwarded back to [facility].

## 2018-06-28 NOTE — TELEPHONE ENCOUNTER
Patient does not see pulmonology. Last order signed August 2016. See attached forms. Last appt was Physical on 11/28/17.

## 2018-06-28 NOTE — TELEPHONE ENCOUNTER
Incoming (mail or fax):  fax  Received from:  [facility]  Documentation given to:  triage

## 2018-07-11 ENCOUNTER — APPOINTMENT (OUTPATIENT)
Dept: GENERAL RADIOLOGY | Facility: HOSPITAL | Age: 58
End: 2018-07-11
Attending: EMERGENCY MEDICINE
Payer: COMMERCIAL

## 2018-07-11 ENCOUNTER — HOSPITAL ENCOUNTER (OUTPATIENT)
Facility: HOSPITAL | Age: 58
Setting detail: OBSERVATION
Discharge: HOME | End: 2018-07-12
Attending: EMERGENCY MEDICINE | Admitting: HOSPITALIST
Payer: COMMERCIAL

## 2018-07-11 DIAGNOSIS — E87.6 HYPOKALEMIA: ICD-10-CM

## 2018-07-11 DIAGNOSIS — R06.00 DYSPNEA, UNSPECIFIED TYPE: ICD-10-CM

## 2018-07-11 DIAGNOSIS — N17.9 ACUTE RENAL FAILURE, UNSPECIFIED ACUTE RENAL FAILURE TYPE (HCC): Primary | ICD-10-CM

## 2018-07-11 PROBLEM — R73.9 HYPERGLYCEMIA: Status: ACTIVE | Noted: 2018-07-11

## 2018-07-11 PROBLEM — E87.20 METABOLIC ACIDOSIS: Status: ACTIVE | Noted: 2018-07-11

## 2018-07-11 PROBLEM — E87.2 METABOLIC ACIDOSIS: Status: ACTIVE | Noted: 2018-07-11

## 2018-07-11 LAB
ALBUMIN SERPL-MCNC: 3.8 G/DL (ref 3.5–4.8)
ALP LIVER SERPL-CCNC: 41 U/L (ref 45–117)
ALT SERPL-CCNC: 33 U/L (ref 17–63)
AST SERPL-CCNC: 36 U/L (ref 15–41)
ATRIAL RATE: 73 BPM
BASOPHILS # BLD AUTO: 0.02 X10(3) UL (ref 0–0.1)
BASOPHILS NFR BLD AUTO: 0.3 %
BETA NATRIURETIC PEPTIDE: 6 PG/ML (ref 2–99)
BILIRUB SERPL-MCNC: 0.6 MG/DL (ref 0.1–2)
BUN BLD-MCNC: 25 MG/DL (ref 8–20)
CALCIUM BLD-MCNC: 9.6 MG/DL (ref 8.3–10.3)
CHLORIDE: 110 MMOL/L (ref 101–111)
CO2: 21 MMOL/L (ref 22–32)
CREAT BLD-MCNC: 2.19 MG/DL (ref 0.7–1.3)
EOSINOPHIL # BLD AUTO: 0.15 X10(3) UL (ref 0–0.3)
EOSINOPHIL NFR BLD AUTO: 2.4 %
ERYTHROCYTE [DISTWIDTH] IN BLOOD BY AUTOMATED COUNT: 15.5 % (ref 11.5–16)
GLUCOSE BLD-MCNC: 132 MG/DL (ref 65–99)
GLUCOSE BLD-MCNC: 142 MG/DL (ref 70–99)
GLUCOSE BLD-MCNC: 89 MG/DL (ref 65–99)
HCT VFR BLD AUTO: 37.4 % (ref 37–53)
HGB BLD-MCNC: 12.4 G/DL (ref 13–17)
IMMATURE GRANULOCYTE COUNT: 0.04 X10(3) UL (ref 0–1)
IMMATURE GRANULOCYTE RATIO %: 0.6 %
LYMPHOCYTES # BLD AUTO: 1.15 X10(3) UL (ref 0.9–4)
LYMPHOCYTES NFR BLD AUTO: 18.2 %
M PROTEIN MFR SERPL ELPH: 7.4 G/DL (ref 6.1–8.3)
MCH RBC QN AUTO: 28.8 PG (ref 27–33.2)
MCHC RBC AUTO-ENTMCNC: 33.2 G/DL (ref 31–37)
MCV RBC AUTO: 86.8 FL (ref 80–99)
MONOCYTES # BLD AUTO: 0.62 X10(3) UL (ref 0.1–1)
MONOCYTES NFR BLD AUTO: 9.8 %
NEUTROPHIL ABS PRELIM: 4.33 X10 (3) UL (ref 1.3–6.7)
NEUTROPHILS # BLD AUTO: 4.33 X10(3) UL (ref 1.3–6.7)
NEUTROPHILS NFR BLD AUTO: 68.7 %
P AXIS: 34 DEGREES
P-R INTERVAL: 148 MS
PLATELET # BLD AUTO: 282 10(3)UL (ref 150–450)
POTASSIUM SERPL-SCNC: 3.5 MMOL/L (ref 3.6–5.1)
Q-T INTERVAL: 372 MS
QRS DURATION: 82 MS
QTC CALCULATION (BEZET): 409 MS
R AXIS: 6 DEGREES
RBC # BLD AUTO: 4.31 X10(6)UL (ref 4.3–5.7)
RED CELL DISTRIBUTION WIDTH-SD: 48.6 FL (ref 35.1–46.3)
SODIUM SERPL-SCNC: 143 MMOL/L (ref 136–144)
T AXIS: 9 DEGREES
TROPONIN: <0.046 NG/ML (ref ?–0.05)
TROPONIN: <0.046 NG/ML (ref ?–0.05)
VENTRICULAR RATE: 73 BPM
WBC # BLD AUTO: 6.3 X10(3) UL (ref 4–13)

## 2018-07-11 PROCEDURE — 99220 INITIAL OBSERVATION CARE,LEVL III: CPT | Performed by: HOSPITALIST

## 2018-07-11 PROCEDURE — 71045 X-RAY EXAM CHEST 1 VIEW: CPT | Performed by: EMERGENCY MEDICINE

## 2018-07-11 RX ORDER — DEXTROSE MONOHYDRATE 25 G/50ML
50 INJECTION, SOLUTION INTRAVENOUS
Status: DISCONTINUED | OUTPATIENT
Start: 2018-07-11 | End: 2018-07-12

## 2018-07-11 RX ORDER — SODIUM CHLORIDE 9 MG/ML
INJECTION, SOLUTION INTRAVENOUS CONTINUOUS
Status: DISCONTINUED | OUTPATIENT
Start: 2018-07-11 | End: 2018-07-12

## 2018-07-11 RX ORDER — FENOFIBRATE 134 MG/1
134 CAPSULE ORAL
Status: DISCONTINUED | OUTPATIENT
Start: 2018-07-11 | End: 2018-07-11

## 2018-07-11 RX ORDER — ATORVASTATIN CALCIUM 20 MG/1
20 TABLET, FILM COATED ORAL NIGHTLY
Status: DISCONTINUED | OUTPATIENT
Start: 2018-07-11 | End: 2018-07-12

## 2018-07-11 RX ORDER — LOSARTAN POTASSIUM 50 MG/1
TABLET ORAL
Qty: 90 TABLET | Refills: 0 | Status: SHIPPED | OUTPATIENT
Start: 2018-07-11 | End: 2018-11-06

## 2018-07-11 RX ORDER — PRAVASTATIN SODIUM 80 MG/1
TABLET ORAL
Qty: 90 TABLET | Refills: 0 | Status: SHIPPED | OUTPATIENT
Start: 2018-07-11 | End: 2018-11-06

## 2018-07-11 RX ORDER — ONDANSETRON 2 MG/ML
4 INJECTION INTRAMUSCULAR; INTRAVENOUS EVERY 6 HOURS PRN
Status: DISCONTINUED | OUTPATIENT
Start: 2018-07-11 | End: 2018-07-12

## 2018-07-11 RX ORDER — NITROGLYCERIN 0.4 MG/1
0.4 TABLET SUBLINGUAL EVERY 5 MIN PRN
Status: DISCONTINUED | OUTPATIENT
Start: 2018-07-11 | End: 2018-07-12

## 2018-07-11 RX ORDER — FENOFIBRATE 145 MG/1
TABLET, COATED ORAL
Qty: 90 TABLET | Refills: 0 | Status: SHIPPED | OUTPATIENT
Start: 2018-07-11 | End: 2018-07-12

## 2018-07-11 RX ORDER — ACETAMINOPHEN 325 MG/1
650 TABLET ORAL EVERY 6 HOURS PRN
Status: DISCONTINUED | OUTPATIENT
Start: 2018-07-11 | End: 2018-07-12

## 2018-07-11 RX ORDER — METOCLOPRAMIDE HYDROCHLORIDE 5 MG/ML
5 INJECTION INTRAMUSCULAR; INTRAVENOUS EVERY 8 HOURS PRN
Status: DISCONTINUED | OUTPATIENT
Start: 2018-07-11 | End: 2018-07-12

## 2018-07-11 RX ORDER — SODIUM CHLORIDE 9 MG/ML
1000 INJECTION, SOLUTION INTRAVENOUS CONTINUOUS
Status: ACTIVE | OUTPATIENT
Start: 2018-07-11 | End: 2018-07-11

## 2018-07-11 RX ORDER — LOSARTAN POTASSIUM 50 MG/1
50 TABLET ORAL DAILY
Status: DISCONTINUED | OUTPATIENT
Start: 2018-07-11 | End: 2018-07-11

## 2018-07-11 RX ORDER — HEPARIN SODIUM 5000 [USP'U]/ML
5000 INJECTION, SOLUTION INTRAVENOUS; SUBCUTANEOUS EVERY 8 HOURS SCHEDULED
Status: DISCONTINUED | OUTPATIENT
Start: 2018-07-11 | End: 2018-07-12

## 2018-07-11 RX ORDER — POTASSIUM CHLORIDE 20 MEQ/1
40 TABLET, EXTENDED RELEASE ORAL ONCE
Status: COMPLETED | OUTPATIENT
Start: 2018-07-11 | End: 2018-07-11

## 2018-07-11 RX ORDER — ASPIRIN 81 MG/1
81 TABLET, CHEWABLE ORAL DAILY
Status: DISCONTINUED | OUTPATIENT
Start: 2018-07-12 | End: 2018-07-12

## 2018-07-11 NOTE — PLAN OF CARE
Diabetes/Glucose Control    • Glucose maintained within prescribed range Progressing        HEMATOLOLGIC    • Maintain hematologic stability Progressing        Patient/Family Goals    • Patient/Family Long Term Goal Progressing    • Patient/Family Short Te

## 2018-07-11 NOTE — ED INITIAL ASSESSMENT (HPI)
Patient to ED via EMS. Reports he was working outside in the heat for a couple hours.  + SOB, + tingling and numbness to left arm and leg. Patient c/o only slight SOB at this time.    Vivian was normal enroute.  + nausea and vomit enroute, Zofran giv

## 2018-07-11 NOTE — PROGRESS NOTES
07/11/18 1657 07/11/18 1708 07/11/18 1710   Vital Signs   Temp --  --  98.9 °F (37.2 °C)   Temp src --  --  Oral   Pulse 77 76 76   Heart Rate Source Monitor Monitor Monitor   Resp 16 16 12   Respiratory Quality Normal Normal Normal   /87 138/75 1

## 2018-07-11 NOTE — TELEPHONE ENCOUNTER
Last OV relevant to medication: 11/28/18  Last refill date: Pravastatin 11/28/18, Fenofibrate 11/8/18, Losartan 5/25/18     #/refills: 90/1, 90/1, 90/0  When pt was asked to return for OV: 1 year   Upcoming appt/reason: None    Spoke to patient, patient st

## 2018-07-11 NOTE — H&P
[facility] H[facility]PITAL[facility]  History and Physical     [place] Patient Status:  Observation    1960 MRN [id number]   [facility] 2NE-A Attending Francisco Garces MD   Hosp Day # 0 PCP Kellie Conde DO     Chief Complaint: Shortness of breath History:  2/2011: APPENDECTOMY      Comment: Removed in Colon cancer surgury  01/31/2012: COLONOSCOPY  1/2015: COLONOSCOPY      Comment: since colon cancer have 1 every 3 years  2/2011: LAPAROSCOPY, SURGICAL; COLECTOMY, PARTIAL, W/ *  No date: TONSILLECTOM [DISCONTINUED] Fenofibrate 145 MG Oral Tab Take 1 tablet (145 mg total) by mouth once daily. Disp: 90 tablet Rfl: 1       Review of Systems:   A comprehensive 14 point review of systems was completed.     Pertinent positives and negatives noted in the HPI hypertension, hyperlipidemia, diabetes  1. Cardiology on consult. Likely need stress test  2. Continue aspirin  3. Check lipid panel  4. Troponin  2. Acute kidney injury secondary to dehydration  1. IV fluids  3. DM 2  1. Insulin sliding scale  4.  Hyperte

## 2018-07-11 NOTE — PROGRESS NOTES
[facility] Pharmacy Note:  Renal Dose Adjustment for Metoclopramide (REGLAN)    Divine Saucedo has been prescribed Metoclopramide (REGLAN) 10 mg every 8 hours as needed for nausea, vomiting.     Estimated Creatinine Clearance: 39.9 mL/min (A) (based on SCr of 2.19

## 2018-07-12 ENCOUNTER — APPOINTMENT (OUTPATIENT)
Dept: CV DIAGNOSTICS | Facility: HOSPITAL | Age: 58
End: 2018-07-12
Attending: INTERNAL MEDICINE
Payer: COMMERCIAL

## 2018-07-12 VITALS
OXYGEN SATURATION: 99 % | HEART RATE: 62 BPM | RESPIRATION RATE: 18 BRPM | BODY MASS INDEX: 27.94 KG/M2 | TEMPERATURE: 98 F | DIASTOLIC BLOOD PRESSURE: 71 MMHG | HEIGHT: 72 IN | WEIGHT: 206.31 LBS | SYSTOLIC BLOOD PRESSURE: 160 MMHG

## 2018-07-12 LAB
BASOPHILS # BLD AUTO: 0.04 X10(3) UL (ref 0–0.1)
BASOPHILS NFR BLD AUTO: 0.9 %
BUN BLD-MCNC: 25 MG/DL (ref 8–20)
CALCIUM BLD-MCNC: 8.4 MG/DL (ref 8.3–10.3)
CHLORIDE: 112 MMOL/L (ref 101–111)
CHOLEST SMN-MCNC: 141 MG/DL (ref ?–200)
CK: 174 IU/L (ref 39–308)
CO2: 26 MMOL/L (ref 22–32)
CREAT BLD-MCNC: 1.49 MG/DL (ref 0.7–1.3)
EOSINOPHIL # BLD AUTO: 0.17 X10(3) UL (ref 0–0.3)
EOSINOPHIL NFR BLD AUTO: 3.7 %
ERYTHROCYTE [DISTWIDTH] IN BLOOD BY AUTOMATED COUNT: 15.9 % (ref 11.5–16)
EST. AVERAGE GLUCOSE BLD GHB EST-MCNC: 128 MG/DL (ref 68–126)
GLUCOSE BLD-MCNC: 100 MG/DL (ref 65–99)
GLUCOSE BLD-MCNC: 103 MG/DL (ref 70–99)
GLUCOSE BLD-MCNC: 93 MG/DL (ref 65–99)
HBA1C MFR BLD HPLC: 6.1 % (ref ?–5.7)
HCT VFR BLD AUTO: 33.1 % (ref 37–53)
HDLC SERPL-MCNC: 12 MG/DL (ref 45–?)
HDLC SERPL: 11.75 {RATIO} (ref ?–4.97)
HGB BLD-MCNC: 10.6 G/DL (ref 13–17)
IMMATURE GRANULOCYTE COUNT: 0.02 X10(3) UL (ref 0–1)
IMMATURE GRANULOCYTE RATIO %: 0.4 %
LDLC SERPL CALC-MCNC: 72 MG/DL (ref ?–130)
LYMPHOCYTES # BLD AUTO: 1.64 X10(3) UL (ref 0.9–4)
LYMPHOCYTES NFR BLD AUTO: 35.5 %
MCH RBC QN AUTO: 28.1 PG (ref 27–33.2)
MCHC RBC AUTO-ENTMCNC: 32 G/DL (ref 31–37)
MCV RBC AUTO: 87.8 FL (ref 80–99)
MONOCYTES # BLD AUTO: 0.43 X10(3) UL (ref 0.1–1)
MONOCYTES NFR BLD AUTO: 9.3 %
NEUTROPHIL ABS PRELIM: 2.32 X10 (3) UL (ref 1.3–6.7)
NEUTROPHILS # BLD AUTO: 2.32 X10(3) UL (ref 1.3–6.7)
NEUTROPHILS NFR BLD AUTO: 50.2 %
NONHDLC SERPL-MCNC: 129 MG/DL (ref ?–130)
PLATELET # BLD AUTO: 236 10(3)UL (ref 150–450)
POTASSIUM SERPL-SCNC: 3.9 MMOL/L (ref 3.6–5.1)
RBC # BLD AUTO: 3.77 X10(6)UL (ref 4.3–5.7)
RED CELL DISTRIBUTION WIDTH-SD: 51.4 FL (ref 35.1–46.3)
SODIUM SERPL-SCNC: 144 MMOL/L (ref 136–144)
TRIGL SERPL-MCNC: 283 MG/DL (ref ?–150)
TROPONIN: <0.046 NG/ML (ref ?–0.05)
VLDLC SERPL CALC-MCNC: 57 MG/DL (ref 5–40)
WBC # BLD AUTO: 4.6 X10(3) UL (ref 4–13)

## 2018-07-12 PROCEDURE — 99217 OBSERVATION CARE DISCHARGE: CPT | Performed by: HOSPITALIST

## 2018-07-12 PROCEDURE — 93306 TTE W/DOPPLER COMPLETE: CPT | Performed by: INTERNAL MEDICINE

## 2018-07-12 NOTE — CONSULTS
[facility]  Report of Consultation    Nate Villagomez Patient Status:  Observation    1960 MRN [id number]   [facility] 2NE-A Attending Deangelo Gutierrez MD   Hosp Day # 0 PCP Sukh Huerta DO     Reason for Consultation:  dyspnea, dehydr TONSILLECTOMY  1990: VASECTOMY  Family History   Problem Relation Age of Onset   • Diabetes Maternal Grandfather    • Cancer Father      melanoma  18   • Neema  Father      and pts uncle   • multiple sclerosis [OTHER] Mother    • Other [OTH Subcutaneous, TID CC and HS    Review of Systems:  All systems were reviewed and are negative except as described above in HPI. Physical Exam:  Blood pressure 146/70, pulse 78, temperature 98.7 °F (37.1 °C), temperature source Oral, resp.  rate 18, heigh 07/11/2018   .0 07/11/2018   CREATSERUM 2.19 07/11/2018   BUN 25 07/11/2018    07/11/2018   K 3.5 07/11/2018    07/11/2018   CO2 21.0 07/11/2018    07/11/2018   CA 9.6 07/11/2018   ALB 3.8 07/11/2018   ALKPHO 41 07/11/2018   [facility]    7/11/2018  10:29 PM

## 2018-07-12 NOTE — PLAN OF CARE
Comments: Pt is A&OX4, VSS on RA w/ (uses CPAP at noc; own mask at bedside), and maintaining NSR w/PVCs on telemetry. Awaiting 2D echo to be done w/results and if WNL, will likely d/c home.   IVF still infusing d/t dehydration; creatinine improved from CARDIOVASCULAR - ADULT  Goal: Maintains optimal cardiac output and hemodynamic stability  INTERVENTIONS:  - Monitor vital signs, rhythm, and trends  - Monitor for bleeding, hypotension and signs of decreased cardiac output  - Evaluate effectiveness of vaso

## 2018-07-12 NOTE — PROGRESS NOTES
[facility]  Cardiology Progress Note    Judewilliamanabelpablo Gaminglavinia Patient Status:  Observation    1960 MRN [id number]   [facility] 2NE-A Attending Daphne Cronin MD   Hosp Day # 0 PCP Ham Gaston DO     Subjective:  Doing well.  Sherrie to troponin unremarkable  · Nausea and vomiting, associated dehydration and renal insufficiency - kidney function improving with fluids, holding ARB  · Essential HTN- acceptable off ARB   · Dyslipidemia on statin, LDL 72, Trigs 283 (fibrate on hold with RI)

## 2018-07-12 NOTE — PAYOR COMM NOTE
--------------  ADMISSION REVIEW     Payor: [facility]  Subscriber #:  [id number]  Authorization Number: N/A    Admit date: 7/11/18       Admitting Physician: Georges Oliveira MD  Attending Physician:  Georges Oliveira MD  Primary Care Physician: Susi Black, cancer have 1 every 3 years  2/2011: LAPAROSCOPY, SURGICAL; COLECTOMY, PARTIAL, W/ *  No date: TONSILLECTOMY  1990: VASECTOMY    Family history reviewed and is not pertinent to presenting problem.        Review of Systems    Positive for stated complaint: S American 32 (*)     GFR, -American 37 (*)     Alkaline Phosphatase 41 (*)     Potassium 3.5 (*)     CO2 21.0 (*)     All other components within normal limits   POCT GLUCOSE - Abnormal; Notable for the following:     POC Glucose 132 (*)     All othe Hyperglycemia R73.9 7/11/2018 Yes    Hypokalemia E87.6 4/31/8848     Metabolic acidosis K95.0 7/40/0577 Yes    Type 2 diabetes mellitus without complication, without long-term current use of insulin ([facility] E11.9 Unknown Unknown            [facility] systems was completed. Pertinent positives and negatives noted in the HPI.     Physical Exam:    /73 (BP Location: Left arm)   Pulse 76   Temp 98.9 °F (37.2 °C) (Oral)   Resp 12   Ht 182.9 cm (6')   Wt 206 lb 4.8 oz (93.6 kg)   SpO2 97%   BMI 27.98 scale  4. Hypertension  1. Continue home medications  5. CAROLINA  1.  Ok ot use home cpap    Quality:  · DVT Prophylaxis: heparin  · CODE status: full  · Cerda: none      CARDIOLOGY CONSULT  Reason for Consultation:  dyspnea, dehydration, BILLY    History of Pres - we may offer echo with doppler, but we can find chronic abns  - no need for ischemia cardiac work based on clinical presentation unless echo is very abnormal otherwise can be done electively with PCP outpatient later this year  - hold Losartan and fenofi

## 2018-07-12 NOTE — ED PROVIDER NOTES
Patient Seen in: [facility] 2ne-a    History   Patient presents with:  Dyspnea FARIDEH SOB (respiratory)    Stated Complaint: SOB    HPI    Patient is a 50-year-old male comes emergency room for evaluation of shortness of breath.   Patient states he was Smokeless tobacco: Never Used                      Alcohol use: Yes           0.0 oz/week     Comment: 2-3 drinks per week       Review of Systems    Positive for stated complaint: SOB  Other systems are as noted in HPI.   Constitutional and vital sign (*)     Potassium 3.5 (*)     CO2 21.0 (*)     All other components within normal limits   POCT GLUCOSE - Abnormal; Notable for the following:     POC Glucose 132 (*)     All other components within normal limits   CBC W/ DIFFERENTIAL - Abnormal; Notable f Jono Tracy MD            ED Course as of Jul 11 2158  ------------------------------------------------------------  Patient given IV fluids .   Case discussed with Harpreet Wright hospitalist    [facility]     Admission disposition: 7/11/2018  3:06 PM         Patient is a 58-yea Dehydration E86.0 Unknown Unknown    Dyspnea, unspecified type R06.00 7/11/2018     Hyperglycemia R73.9 7/11/2018 Yes    Hypokalemia E87.6 4/21/0931     Metabolic acidosis D96.7 4/24/9328 Yes    Type 2 diabetes mellitus without complication, without long-t

## 2018-07-13 ENCOUNTER — PATIENT OUTREACH (OUTPATIENT)
Dept: CASE MANAGEMENT | Age: 58
End: 2018-07-13

## 2018-07-13 DIAGNOSIS — N17.9 ACUTE KIDNEY INJURY (HCC): ICD-10-CM

## 2018-07-13 DIAGNOSIS — R07.9 CHEST PAIN WITH MODERATE RISK FOR CARDIAC ETIOLOGY: ICD-10-CM

## 2018-07-13 DIAGNOSIS — E86.0 DEHYDRATION: ICD-10-CM

## 2018-07-13 DIAGNOSIS — E87.6 HYPOKALEMIA: ICD-10-CM

## 2018-07-13 NOTE — DISCHARGE SUMMARY
[facility]  DISCHARGE SUMMARY     Kennedy Barth Patient Status:  Observation    1960 MRN [id number]   [facility] 2NE-A Attending No att. providers found   Hosp Day # 0 PCP Chan Bassett DO     Date of Admission: 2018  Date of resolved and is creatinine went from 2-1.49. He went from low urine output to urinating significantly as well. He was seen by cardiologist who felt that his symptoms were completely due to dehydration and was not cardiac in nature.   He had an unremarkabl mouth.    Refills:  0        STOP taking these medications    Fenofibrate 145 MG Tabs  Commonly known as:  TRICOR  Notes to patient:  Do not resume this medication unless directed by your primary care doctor after your kidney function test (blood test) has

## 2018-07-16 NOTE — PROGRESS NOTES
Initial Post Discharge Follow Up   Discharge Date: 7/12/18  Contact Date: 7/13/2018    Consent Verification:  Assessment Completed With: Patient  HIPAA Verified?   Yes    Discharge Dx:    GABE, Dehydration, Dyspnea  HX: MS    General:   • How have you bee Cap Take by mouth. Disp:  Rfl:    Cyanocobalamin (VITAMIN B 12 OR) Take by mouth. Disp:  Rfl:    Teriflunomide 14 MG Oral Tab Take 1 tablet by mouth daily. Disp:  Rfl:    Fexofenadine HCl (ALLEGRA) 180 MG Oral Tab Take 1 tablet by mouth daily as needed. medications reviewed/discussed/and reconciled with patient, and orders reviewed and discussed. Any changes or updates to medications and or orders sent to PCP.

## 2018-07-18 ENCOUNTER — LAB ENCOUNTER (OUTPATIENT)
Dept: LAB | Age: 58
End: 2018-07-18
Attending: INTERNAL MEDICINE
Payer: COMMERCIAL

## 2018-07-18 DIAGNOSIS — N17.9 ACUTE RENAL FAILURE, UNSPECIFIED ACUTE RENAL FAILURE TYPE (HCC): ICD-10-CM

## 2018-07-18 DIAGNOSIS — Z00.00 ROUTINE PHYSICAL EXAMINATION: ICD-10-CM

## 2018-07-18 DIAGNOSIS — E87.6 HYPOKALEMIA: ICD-10-CM

## 2018-07-18 LAB
ALBUMIN SERPL-MCNC: 3.9 G/DL (ref 3.5–4.8)
ALP LIVER SERPL-CCNC: 41 U/L (ref 45–117)
ALT SERPL-CCNC: 41 U/L (ref 17–63)
AST SERPL-CCNC: 48 U/L (ref 15–41)
BASOPHILS # BLD AUTO: 0.02 X10(3) UL (ref 0–0.1)
BASOPHILS NFR BLD AUTO: 0.6 %
BILIRUB SERPL-MCNC: 0.7 MG/DL (ref 0.1–2)
BUN BLD-MCNC: 22 MG/DL (ref 8–20)
CALCIUM BLD-MCNC: 9.3 MG/DL (ref 8.3–10.3)
CHLORIDE: 106 MMOL/L (ref 101–111)
CHOLEST SMN-MCNC: 147 MG/DL (ref ?–200)
CO2: 26 MMOL/L (ref 22–32)
COMPLEXED PSA SERPL-MCNC: 2.55 NG/ML (ref 0.01–4)
CREAT BLD-MCNC: 1.26 MG/DL (ref 0.7–1.3)
EOSINOPHIL # BLD AUTO: 0.2 X10(3) UL (ref 0–0.3)
EOSINOPHIL NFR BLD AUTO: 6.5 %
ERYTHROCYTE [DISTWIDTH] IN BLOOD BY AUTOMATED COUNT: 15.7 % (ref 11.5–16)
EST. AVERAGE GLUCOSE BLD GHB EST-MCNC: 126 MG/DL (ref 68–126)
FREE T4: 1.1 NG/DL (ref 0.9–1.8)
GLUCOSE BLD-MCNC: 107 MG/DL (ref 70–99)
HBA1C MFR BLD HPLC: 6 % (ref ?–5.7)
HCT VFR BLD AUTO: 38 % (ref 37–53)
HDLC SERPL-MCNC: 25 MG/DL (ref 45–?)
HDLC SERPL: 5.88 {RATIO} (ref ?–4.97)
HGB BLD-MCNC: 11.9 G/DL (ref 13–17)
IMMATURE GRANULOCYTE COUNT: 0.01 X10(3) UL (ref 0–1)
IMMATURE GRANULOCYTE RATIO %: 0.3 %
LDLC SERPL CALC-MCNC: 92 MG/DL (ref ?–130)
LYMPHOCYTES # BLD AUTO: 1.03 X10(3) UL (ref 0.9–4)
LYMPHOCYTES NFR BLD AUTO: 33.4 %
M PROTEIN MFR SERPL ELPH: 7.2 G/DL (ref 6.1–8.3)
MCH RBC QN AUTO: 28.2 PG (ref 27–33.2)
MCHC RBC AUTO-ENTMCNC: 31.3 G/DL (ref 31–37)
MCV RBC AUTO: 90 FL (ref 80–99)
MONOCYTES # BLD AUTO: 0.47 X10(3) UL (ref 0.1–1)
MONOCYTES NFR BLD AUTO: 15.3 %
NEUTROPHIL ABS PRELIM: 1.35 X10 (3) UL (ref 1.3–6.7)
NEUTROPHILS # BLD AUTO: 1.35 X10(3) UL (ref 1.3–6.7)
NEUTROPHILS NFR BLD AUTO: 43.9 %
NONHDLC SERPL-MCNC: 122 MG/DL (ref ?–130)
PLATELET # BLD AUTO: 267 10(3)UL (ref 150–450)
POTASSIUM SERPL-SCNC: 4.1 MMOL/L (ref 3.6–5.1)
RBC # BLD AUTO: 4.22 X10(6)UL (ref 4.3–5.7)
RED CELL DISTRIBUTION WIDTH-SD: 51.3 FL (ref 35.1–46.3)
SODIUM SERPL-SCNC: 139 MMOL/L (ref 136–144)
TRIGL SERPL-MCNC: 151 MG/DL (ref ?–150)
TSI SER-ACNC: 0.74 MIU/ML (ref 0.35–5.5)
VLDLC SERPL CALC-MCNC: 30 MG/DL (ref 5–40)
WBC # BLD AUTO: 3.1 X10(3) UL (ref 4–13)

## 2018-07-18 PROCEDURE — 83036 HEMOGLOBIN GLYCOSYLATED A1C: CPT | Performed by: INTERNAL MEDICINE

## 2018-07-18 PROCEDURE — 80050 GENERAL HEALTH PANEL: CPT | Performed by: INTERNAL MEDICINE

## 2018-07-18 PROCEDURE — 36415 COLL VENOUS BLD VENIPUNCTURE: CPT | Performed by: INTERNAL MEDICINE

## 2018-07-18 PROCEDURE — 84153 ASSAY OF PSA TOTAL: CPT | Performed by: INTERNAL MEDICINE

## 2018-07-18 PROCEDURE — 84439 ASSAY OF FREE THYROXINE: CPT | Performed by: INTERNAL MEDICINE

## 2018-07-18 PROCEDURE — 80061 LIPID PANEL: CPT | Performed by: INTERNAL MEDICINE

## 2018-07-23 ENCOUNTER — OFFICE VISIT (OUTPATIENT)
Dept: INTERNAL MEDICINE CLINIC | Facility: CLINIC | Age: 58
End: 2018-07-23
Payer: COMMERCIAL

## 2018-07-23 VITALS
DIASTOLIC BLOOD PRESSURE: 64 MMHG | HEART RATE: 69 BPM | BODY MASS INDEX: 27.06 KG/M2 | SYSTOLIC BLOOD PRESSURE: 124 MMHG | HEIGHT: 73 IN | TEMPERATURE: 99 F | OXYGEN SATURATION: 97 % | RESPIRATION RATE: 14 BRPM | WEIGHT: 204.19 LBS

## 2018-07-23 DIAGNOSIS — R07.9 CHEST PAIN WITH MODERATE RISK FOR CARDIAC ETIOLOGY: ICD-10-CM

## 2018-07-23 DIAGNOSIS — E11.9 TYPE 2 DIABETES MELLITUS WITHOUT COMPLICATION, WITHOUT LONG-TERM CURRENT USE OF INSULIN (HCC): ICD-10-CM

## 2018-07-23 DIAGNOSIS — I10 ESSENTIAL HYPERTENSION: ICD-10-CM

## 2018-07-23 DIAGNOSIS — N17.9 ACUTE KIDNEY INJURY (HCC): Primary | ICD-10-CM

## 2018-07-23 DIAGNOSIS — G47.33 OSA (OBSTRUCTIVE SLEEP APNEA): ICD-10-CM

## 2018-07-23 DIAGNOSIS — E86.0 DEHYDRATION: ICD-10-CM

## 2018-07-23 DIAGNOSIS — R06.00 DYSPNEA, UNSPECIFIED TYPE: ICD-10-CM

## 2018-07-23 DIAGNOSIS — R20.2 ARM PARESTHESIA, LEFT: ICD-10-CM

## 2018-07-23 PROCEDURE — 99495 TRANSJ CARE MGMT MOD F2F 14D: CPT | Performed by: NURSE PRACTITIONER

## 2018-07-23 RX ORDER — FENOFIBRATE 145 MG/1
145 TABLET, COATED ORAL
Qty: 90 TABLET | Refills: 0 | COMMUNITY
Start: 2018-07-23 | End: 2018-11-06

## 2018-07-23 RX ORDER — OXYBUTYNIN CHLORIDE 10 MG/1
10 TABLET, EXTENDED RELEASE ORAL AS NEEDED
Refills: 10 | COMMUNITY
End: 2019-03-06

## 2018-07-23 NOTE — PROGRESS NOTES
HPI:    Nisha Pedraza is a 60year old male here today for hospital follow up.    He was discharged from Inpatient hospital, [facility] to Home   Admit Date: 7/11/18  Discharge Date: 7/12/18  Hospital Discharge Diagnosis:     Acute kidney injury [facility] cardiologist who felt that his symptoms were completely due to dehydration and was not cardiac in nature. He had an unremarkable EKG and negative troponins. They recommend he follow-up as an outpatient and could consider a stress test at a later time.   A past surgical history that includes colonoscopy (01/31/2012); laparoscopy, surgical; colectomy, partial, w/ anastomos (2/2011); tonsillectomy; appendectomy (2/2011); colonoscopy (1/2015); and vasectomy (1990).     He family history includes Cancer in his fa visually inspected and the appearance is normal, barefoot skin exam is normal, no wounds. Bilateral barefoot monofilament exam completed and is within normal limits. Pedal pulses assessed bilaterally and are within normal parameters.   NEURO: Oriented carlyn moderate    Patient seen within 14 days from date of discharge.      Froilan Dalal, NP, 7/23/2018

## 2018-11-06 ENCOUNTER — TELEPHONE (OUTPATIENT)
Dept: INTERNAL MEDICINE CLINIC | Facility: CLINIC | Age: 58
End: 2018-11-06

## 2018-11-06 RX ORDER — PRAVASTATIN SODIUM 80 MG/1
80 TABLET ORAL
Qty: 90 TABLET | Refills: 0 | Status: SHIPPED | OUTPATIENT
Start: 2018-11-06 | End: 2019-01-23

## 2018-11-06 RX ORDER — LOSARTAN POTASSIUM 50 MG/1
TABLET ORAL
Qty: 90 TABLET | Refills: 0 | OUTPATIENT
Start: 2018-11-06

## 2018-11-06 RX ORDER — FENOFIBRATE 145 MG/1
TABLET, COATED ORAL
Qty: 90 TABLET | Refills: 0 | OUTPATIENT
Start: 2018-11-06

## 2018-11-06 RX ORDER — LOSARTAN POTASSIUM 50 MG/1
TABLET ORAL
Qty: 90 TABLET | Refills: 0 | Status: SHIPPED | OUTPATIENT
Start: 2018-11-06 | End: 2019-01-23

## 2018-11-06 RX ORDER — FENOFIBRATE 145 MG/1
145 TABLET, COATED ORAL
Qty: 90 TABLET | Refills: 0 | Status: SHIPPED | OUTPATIENT
Start: 2018-11-06 | End: 2019-01-23

## 2018-11-06 RX ORDER — PRAVASTATIN SODIUM 80 MG/1
TABLET ORAL
Qty: 90 TABLET | Refills: 0 | OUTPATIENT
Start: 2018-11-06

## 2018-11-12 ENCOUNTER — NURSE ONLY (OUTPATIENT)
Dept: HEMATOLOGY/ONCOLOGY | Age: 58
End: 2018-11-12
Attending: INTERNAL MEDICINE
Payer: COMMERCIAL

## 2018-11-12 DIAGNOSIS — D3A.029 CARCINOID TUMOR OF COLON: Primary | ICD-10-CM

## 2018-11-12 PROCEDURE — 85025 COMPLETE CBC W/AUTO DIFF WBC: CPT

## 2018-11-12 PROCEDURE — 36415 COLL VENOUS BLD VENIPUNCTURE: CPT

## 2018-11-12 PROCEDURE — 86316 IMMUNOASSAY TUMOR OTHER: CPT

## 2018-11-12 PROCEDURE — 80053 COMPREHEN METABOLIC PANEL: CPT

## 2018-11-13 ENCOUNTER — APPOINTMENT (OUTPATIENT)
Dept: LAB | Age: 58
End: 2018-11-13
Attending: INTERNAL MEDICINE
Payer: COMMERCIAL

## 2018-11-13 DIAGNOSIS — D3A.029 CARCINOID TUMOR OF COLON: ICD-10-CM

## 2018-11-13 PROCEDURE — 83497 ASSAY OF 5-HIAA: CPT

## 2018-12-12 ENCOUNTER — OFFICE VISIT (OUTPATIENT)
Dept: HEMATOLOGY/ONCOLOGY | Age: 58
End: 2018-12-12
Attending: INTERNAL MEDICINE
Payer: COMMERCIAL

## 2018-12-12 VITALS
DIASTOLIC BLOOD PRESSURE: 96 MMHG | WEIGHT: 211 LBS | HEART RATE: 72 BPM | RESPIRATION RATE: 18 BRPM | OXYGEN SATURATION: 98 % | BODY MASS INDEX: 28 KG/M2 | SYSTOLIC BLOOD PRESSURE: 160 MMHG | TEMPERATURE: 98 F

## 2018-12-12 DIAGNOSIS — D3A.021 CARCINOID TUMOR OF CECUM: Primary | ICD-10-CM

## 2018-12-12 PROCEDURE — 99213 OFFICE O/P EST LOW 20 MIN: CPT | Performed by: INTERNAL MEDICINE

## 2018-12-12 NOTE — PROGRESS NOTES
Cancer Center Progress Note  Patient Name: Pascael Denson   YOB: 1960   Medical Record Number: [id number]     Attending Physician: Leti Garcia M.D. Date of Visit: 12/12/2018        Chief Complaint:  Patient presents with:   Derrick Snyder Grandmother    • Other (heart attack) Paternal Grandmother 79       Social History:  Social History    Socioeconomic History      Marital status:       Spouse name: Not on file      Number of children: Not on file      Years of education: Not on justa needed for Erectile Dysfunction. , Disp: 10 tablet, Rfl: 11  •  Cholecalciferol (VITAMIN D) 2000 UNITS Oral Cap, Take by mouth., Disp: , Rfl:   •  Cyanocobalamin (VITAMIN B 12 OR), Take by mouth., Disp: , Rfl:   •  Teriflunomide 14 MG Oral Tab, Take 1 table murmurs, gallops or rubs. Abdomen Normal - Non-tender, non-distended, no masses, ascites or hepatosplenomegaly. Extremities Normal - No visible deformities, no cyanosis, clubbing or edema.     Integumentary Normal - No rashes, No Jaundice   Neurologic 450.0 10(3)uL 285.0   RBC Latest Ref Range: 4.30 - 5.70 x10(6)uL 4.45   MCH Latest Ref Range: 27.0 - 33.2 pg 28.1   MCHC Latest Ref Range: 31.0 - 37.0 g/dL 31.5   MCV Latest Ref Range: 80.0 - 99.0 fL 89.2   RDW Latest Ref Range: 11.5 - 16.0 % 15.7   RDW-SD

## 2018-12-12 NOTE — PROGRESS NOTES
Outpatient Oncology Care Plan  Problem list:  knowledge deficit    Problems related to:    disease/disease progression    Interventions:  discussion    Expected outcomes:  understands plan of care    Progress towards outcome:  making progress    Education

## 2018-12-18 ENCOUNTER — HOSPITAL ENCOUNTER (OUTPATIENT)
Dept: MRI IMAGING | Age: 58
Discharge: HOME | End: 2018-12-18
Attending: Other
Payer: COMMERCIAL

## 2018-12-18 DIAGNOSIS — G35 MS (MULTIPLE SCLEROSIS) (HCC): ICD-10-CM

## 2018-12-18 DIAGNOSIS — Q23.2 MS (CONGENITAL MITRAL STENOSIS): ICD-10-CM

## 2018-12-18 PROCEDURE — 72141 MRI NECK SPINE W/O DYE: CPT | Performed by: OTHER

## 2018-12-18 PROCEDURE — 70551 MRI BRAIN STEM W/O DYE: CPT | Performed by: OTHER

## 2019-01-08 ENCOUNTER — HOSPITAL ENCOUNTER (OUTPATIENT)
Dept: NUCLEAR MEDICINE | Facility: HOSPITAL | Age: 59
Discharge: HOME | End: 2019-01-08
Attending: INTERNAL MEDICINE
Payer: COMMERCIAL

## 2019-01-08 ENCOUNTER — TELEPHONE (OUTPATIENT)
Dept: HEMATOLOGY/ONCOLOGY | Facility: HOSPITAL | Age: 59
End: 2019-01-08

## 2019-01-08 DIAGNOSIS — D3A.021 CARCINOID TUMOR OF CECUM: ICD-10-CM

## 2019-01-08 PROCEDURE — 78815 PET IMAGE W/CT SKULL-THIGH: CPT | Performed by: INTERNAL MEDICINE

## 2019-01-14 ENCOUNTER — OFFICE VISIT (OUTPATIENT)
Dept: SURGERY | Facility: CLINIC | Age: 59
End: 2019-01-14
Payer: COMMERCIAL

## 2019-01-14 VITALS
SYSTOLIC BLOOD PRESSURE: 175 MMHG | HEART RATE: 68 BPM | DIASTOLIC BLOOD PRESSURE: 92 MMHG | TEMPERATURE: 98 F | BODY MASS INDEX: 28 KG/M2 | WEIGHT: 210 LBS

## 2019-01-14 DIAGNOSIS — D3A.021 CARCINOID TUMOR OF CECUM: Primary | ICD-10-CM

## 2019-01-14 PROCEDURE — 99214 OFFICE O/P EST MOD 30 MIN: CPT | Performed by: SURGERY

## 2019-01-14 RX ORDER — NEOMYCIN SULFATE 500 MG/1
1000 TABLET ORAL 4 TIMES DAILY
Qty: 6 TABLET | Refills: 0 | Status: ON HOLD | OUTPATIENT
Start: 2019-01-14 | End: 2019-02-07

## 2019-01-14 RX ORDER — METRONIDAZOLE 500 MG/1
500 TABLET ORAL 3 TIMES DAILY
Qty: 3 TABLET | Refills: 0 | Status: ON HOLD | OUTPATIENT
Start: 2019-01-14 | End: 2019-02-07

## 2019-01-14 NOTE — PATIENT INSTRUCTIONS
Surgery:  Laparoscopic possible open ileocolic resection    Date of Surgery:  2/5/19    Hosptial:    [place] [address] [address], [address]  Phone: [phone]    · This is an inpatient procedure.   · Use the provided Chlorhexadine picture ID and insurance card with you. · Wear comfortable clothing that can easily be removed. Preferably, something that zips, snaps, or buttons up the front.    · You will be contacted by the hospital the day prior to your surgery to confirm details and

## 2019-01-14 NOTE — PROGRESS NOTES
Pt reports he had problems with PCA while hospitalized for previous surgery in 2011. Advised pt to remind nursing staff and Dr Key Preciado at time of admission/surgery.

## 2019-01-15 ENCOUNTER — TELEPHONE (OUTPATIENT)
Dept: INTERNAL MEDICINE CLINIC | Facility: CLINIC | Age: 59
End: 2019-01-15

## 2019-01-15 NOTE — H&P
[facility]    Patient Name:  Judith Dick   YOB: 1960   Gender:  Male   Appt Date:  1/14/2019   Provider:  Pranav Hyde MD   Insurance:  [facility]     PATIENT PROVIDERS    Primary Care Provider:Froilan Dalal NP   Address:  On 9/18/2017, he received a PET gallium scan: The previously noted focus of abnormal Ga-68 Netspot (Dotatate) activity in the right lower quadrant associated with distal small bowel is redemonstrated however less intense on present exam now with peak SUV o •  Vardenafil HCl (LEVITRA) 20 MG Oral Tab, Take 1 tablet (20 mg total) by mouth daily as needed for Erectile Dysfunction. , Disp: 10 tablet, Rfl: 11  •  Cholecalciferol (VITAMIN D) 2000 UNITS Oral Cap, Take by mouth., Disp: , Rfl:   •  Cyanocobalamin (STANFORD • Other (Other) Mother         MS   • Other (breast cancer) Maternal Grandmother    • Other (heart attack) Paternal Grandmother 79      Reviewed:       Review of Systems:  Patient reports no rapid or irregular heartbeat. He reports no chest pain.  He report Findings were discussed with patient at length. His wife was present. Options and recommendations were discussed. The case had been presented at our tumor board and my recommendations reflect those of the tumor board.   I recommended laparoscopic possible o

## 2019-01-15 NOTE — TELEPHONE ENCOUNTER
Author: Malcolm Thakkar NP Service: Onelia Syed Type: Nurse Practitioner   Filed: 1/14/2019  6:02 PM Encounter Date: 1/14/2019 Status: Signed   : Malcolm Thakkar NP (Nurse Practitioner)            Needs to be scheduled for pre-op as below.  Also I am lis

## 2019-01-15 NOTE — TELEPHONE ENCOUNTER
Pt has pre-op appt already 1/23/19. Pre-op clearance request faxed to Dr Latisha Rao. Paperwork given to Clinton Mantilla to follow up on.

## 2019-01-15 NOTE — H&P (VIEW-ONLY)
[facility]    Patient Name:  Pascale Densno   YOB: 1960   Gender:  Male   Appt Date:  1/14/2019   Provider:  John Rodriguez MD   Insurance:  [facility]     PATIENT PROVIDERS    Primary Care Provider:Froilan Dalal NP   Address:  On 9/18/2017, he received a PET gallium scan: The previously noted focus of abnormal Ga-68 Netspot (Dotatate) activity in the right lower quadrant associated with distal small bowel is redemonstrated however less intense on present exam now with peak SUV o •  Vardenafil HCl (LEVITRA) 20 MG Oral Tab, Take 1 tablet (20 mg total) by mouth daily as needed for Erectile Dysfunction. , Disp: 10 tablet, Rfl: 11  •  Cholecalciferol (VITAMIN D) 2000 UNITS Oral Cap, Take by mouth., Disp: , Rfl:   •  Cyanocobalamin (STANFORD • Other (Other) Mother         MS   • Other (breast cancer) Maternal Grandmother    • Other (heart attack) Paternal Grandmother 79      Reviewed:       Review of Systems:  Patient reports no rapid or irregular heartbeat. He reports no chest pain.  He report Findings were discussed with patient at length. His wife was present. Options and recommendations were discussed. The case had been presented at our tumor board and my recommendations reflect those of the tumor board.   I recommended laparoscopic possible o

## 2019-01-15 NOTE — TELEPHONE ENCOUNTER
Date Pre-op Schedule w/Provider:  January 23rd with Trudi Raw Name:  Dr. Viktoriya Maloney   Phone #:  [phone]   Fax #:  [phone]  Surgery Date:  2/5/18  Procedure/Diagnosis: Ileocolic resection

## 2019-01-15 NOTE — PROGRESS NOTES
Needs to be scheduled for pre-op as below. Also I am listed as PCP for this patient and this should be addressed. Thanks.

## 2019-01-15 NOTE — TELEPHONE ENCOUNTER
Incoming (mail or fax):  Fax  Received from: Josué Schroeder   Documentation given to:  Triage    *Pre-Op

## 2019-01-16 DIAGNOSIS — D3A.021 CARCINOID TUMOR OF CECUM: Primary | ICD-10-CM

## 2019-01-23 ENCOUNTER — OFFICE VISIT (OUTPATIENT)
Dept: INTERNAL MEDICINE CLINIC | Facility: CLINIC | Age: 59
End: 2019-01-23
Payer: COMMERCIAL

## 2019-01-23 VITALS
HEIGHT: 67.75 IN | RESPIRATION RATE: 16 BRPM | DIASTOLIC BLOOD PRESSURE: 82 MMHG | HEART RATE: 70 BPM | OXYGEN SATURATION: 100 % | SYSTOLIC BLOOD PRESSURE: 144 MMHG | TEMPERATURE: 98 F | WEIGHT: 207 LBS | BODY MASS INDEX: 31.74 KG/M2

## 2019-01-23 DIAGNOSIS — E11.9 TYPE 2 DIABETES MELLITUS WITHOUT COMPLICATION, WITHOUT LONG-TERM CURRENT USE OF INSULIN (HCC): ICD-10-CM

## 2019-01-23 DIAGNOSIS — I10 ESSENTIAL HYPERTENSION: ICD-10-CM

## 2019-01-23 DIAGNOSIS — D3A.021 CARCINOID TUMOR OF CECUM: ICD-10-CM

## 2019-01-23 DIAGNOSIS — G47.33 OSA (OBSTRUCTIVE SLEEP APNEA): ICD-10-CM

## 2019-01-23 DIAGNOSIS — Z01.818 PREOP EXAMINATION: Primary | ICD-10-CM

## 2019-01-23 PROBLEM — E87.6 HYPOKALEMIA: Status: RESOLVED | Noted: 2018-07-11 | Resolved: 2019-01-23

## 2019-01-23 PROBLEM — N17.9 ACUTE KIDNEY INJURY: Status: RESOLVED | Noted: 2018-07-11 | Resolved: 2019-01-23

## 2019-01-23 PROBLEM — N17.9 ACUTE KIDNEY INJURY (HCC): Status: RESOLVED | Noted: 2018-07-11 | Resolved: 2019-01-23

## 2019-01-23 PROBLEM — E87.2 METABOLIC ACIDOSIS: Status: RESOLVED | Noted: 2018-07-11 | Resolved: 2019-01-23

## 2019-01-23 PROBLEM — R73.9 HYPERGLYCEMIA: Status: RESOLVED | Noted: 2018-07-11 | Resolved: 2019-01-23

## 2019-01-23 PROBLEM — E87.20 METABOLIC ACIDOSIS: Status: RESOLVED | Noted: 2018-07-11 | Resolved: 2019-01-23

## 2019-01-23 PROCEDURE — 99244 OFF/OP CNSLTJ NEW/EST MOD 40: CPT | Performed by: NURSE PRACTITIONER

## 2019-01-23 PROCEDURE — 93000 ELECTROCARDIOGRAM COMPLETE: CPT | Performed by: NURSE PRACTITIONER

## 2019-01-23 RX ORDER — PRAVASTATIN SODIUM 80 MG/1
80 TABLET ORAL
Qty: 90 TABLET | Refills: 0 | Status: SHIPPED | OUTPATIENT
Start: 2019-01-23 | End: 2019-05-09

## 2019-01-23 RX ORDER — FENOFIBRATE 145 MG/1
145 TABLET, COATED ORAL
Qty: 90 TABLET | Refills: 0 | Status: SHIPPED | OUTPATIENT
Start: 2019-01-23 | End: 2019-05-09

## 2019-01-23 RX ORDER — LOSARTAN POTASSIUM 50 MG/1
TABLET ORAL
Qty: 90 TABLET | Refills: 0 | Status: ON HOLD | OUTPATIENT
Start: 2019-01-23 | End: 2019-02-05

## 2019-01-23 RX ORDER — POLYETHYLENE GLYCOL-3350 AND ELECTROLYTES 236; 6.74; 5.86; 2.97; 22.74 G/274.31G; G/274.31G; G/274.31G; G/274.31G; G/274.31G
POWDER, FOR SOLUTION ORAL AS DIRECTED
Refills: 0 | COMMUNITY
Start: 2019-01-14 | End: 2019-02-19 | Stop reason: ALTCHOICE

## 2019-01-23 NOTE — PROGRESS NOTES
[facility]  PRE OP RISK ASSESSMENT    REASON FOR CONSULT: Pre-op risk assessment for surgical procedure: laparoscopic, possible open, ileocolic resection planned for 2/5/19 with general anesthesia.     REQUESTING PHYSICIAN: Dr. Constance Bruno COLONOSCOPY  1/2015    since colon cancer have 1 every 3 years   • LAPAROSCOPY, SURGICAL; COLECTOMY, PARTIAL, W/ ANASTOMOS  2/2011   • TONSILLECTOMY     • VASECTOMY  1990       Current Medications:      Current Outpatient Medications:  GAVILYTE-G 236 g Suleiman Connors resource strain: Not on file      Food insecurity - worry: Not on file      Food insecurity - inability: Not on file      Transportation needs - medical: Not on file      Transportation needs - non-medical: Not on file    Occupational History      Not on f Diabetes is diet controlled.      PHYSICAL EXAM:  /82   Pulse 70   Temp 98.3 °F (36.8 °C) (Oral)   Resp 16   Ht 67.75\"   Wt 207 lb   SpO2 100%   BMI 31.71 kg/m²    GENERAL: well developed, well nourished,in no apparent distress  SKIN: no rashes,no robles Consults:  None    Froilan Dalal

## 2019-01-24 ENCOUNTER — LAB ENCOUNTER (OUTPATIENT)
Dept: LAB | Age: 59
End: 2019-01-24
Attending: NURSE PRACTITIONER
Payer: COMMERCIAL

## 2019-01-24 DIAGNOSIS — E11.9 TYPE 2 DIABETES MELLITUS WITHOUT COMPLICATION, WITHOUT LONG-TERM CURRENT USE OF INSULIN (HCC): ICD-10-CM

## 2019-01-24 LAB
EST. AVERAGE GLUCOSE BLD GHB EST-MCNC: 137 MG/DL (ref 68–126)
HBA1C MFR BLD HPLC: 6.4 % (ref ?–5.7)

## 2019-01-24 PROCEDURE — 83036 HEMOGLOBIN GLYCOSYLATED A1C: CPT | Performed by: NURSE PRACTITIONER

## 2019-01-24 PROCEDURE — 36415 COLL VENOUS BLD VENIPUNCTURE: CPT | Performed by: NURSE PRACTITIONER

## 2019-01-28 ENCOUNTER — APPOINTMENT (OUTPATIENT)
Dept: LAB | Age: 59
End: 2019-01-28
Payer: COMMERCIAL

## 2019-01-28 DIAGNOSIS — D3A.021 CARCINOID TUMOR OF CECUM: ICD-10-CM

## 2019-01-28 LAB
ANION GAP SERPL CALC-SCNC: 5 MMOL/L (ref 0–18)
BUN BLD-MCNC: 18 MG/DL (ref 8–20)
BUN/CREAT SERPL: 15.3 (ref 10–20)
CALCIUM BLD-MCNC: 9.2 MG/DL (ref 8.3–10.3)
CHLORIDE SERPL-SCNC: 108 MMOL/L (ref 101–111)
CO2 SERPL-SCNC: 26 MMOL/L (ref 22–32)
CREAT BLD-MCNC: 1.18 MG/DL (ref 0.7–1.3)
ERYTHROCYTE [DISTWIDTH] IN BLOOD BY AUTOMATED COUNT: 15.9 % (ref 11.5–16)
GLUCOSE BLD-MCNC: 86 MG/DL (ref 70–99)
HCT VFR BLD AUTO: 35.6 % (ref 37–53)
HGB BLD-MCNC: 11.6 G/DL (ref 13–17)
MCH RBC QN AUTO: 28.4 PG (ref 27–33.2)
MCHC RBC AUTO-ENTMCNC: 32.6 G/DL (ref 31–37)
MCV RBC AUTO: 87.3 FL (ref 80–99)
OSMOLALITY SERPL CALC.SUM OF ELEC: 289 MOSM/KG (ref 275–295)
PLATELET # BLD AUTO: 286 10(3)UL (ref 150–450)
POTASSIUM SERPL-SCNC: 3.6 MMOL/L (ref 3.6–5.1)
RBC # BLD AUTO: 4.08 X10(6)UL (ref 4.3–5.7)
RED CELL DISTRIBUTION WIDTH-SD: 50.1 FL (ref 35.1–46.3)
SODIUM SERPL-SCNC: 139 MMOL/L (ref 136–144)
WBC # BLD AUTO: 4.6 X10(3) UL (ref 4–13)

## 2019-01-28 PROCEDURE — 36415 COLL VENOUS BLD VENIPUNCTURE: CPT

## 2019-01-28 PROCEDURE — 85027 COMPLETE CBC AUTOMATED: CPT

## 2019-01-28 PROCEDURE — 80048 BASIC METABOLIC PNL TOTAL CA: CPT

## 2019-01-28 RX ORDER — SODIUM CHLORIDE, SODIUM LACTATE, POTASSIUM CHLORIDE, CALCIUM CHLORIDE 600; 310; 30; 20 MG/100ML; MG/100ML; MG/100ML; MG/100ML
INJECTION, SOLUTION INTRAVENOUS CONTINUOUS
Status: CANCELLED | OUTPATIENT
Start: 2019-01-28

## 2019-02-05 ENCOUNTER — HOSPITAL ENCOUNTER (INPATIENT)
Facility: HOSPITAL | Age: 59
LOS: 2 days | Discharge: HOME | DRG: 331 | End: 2019-02-07
Attending: SURGERY | Admitting: SURGERY
Payer: COMMERCIAL

## 2019-02-05 ENCOUNTER — ANESTHESIA (OUTPATIENT)
Dept: SURGERY | Facility: HOSPITAL | Age: 59
DRG: 331 | End: 2019-02-05
Payer: COMMERCIAL

## 2019-02-05 ENCOUNTER — ANESTHESIA EVENT (OUTPATIENT)
Dept: SURGERY | Facility: HOSPITAL | Age: 59
DRG: 331 | End: 2019-02-05
Payer: COMMERCIAL

## 2019-02-05 DIAGNOSIS — R19.7 DIARRHEA, UNSPECIFIED TYPE: ICD-10-CM

## 2019-02-05 DIAGNOSIS — D3A.021 CARCINOID TUMOR OF CECUM: Primary | ICD-10-CM

## 2019-02-05 LAB
GLUCOSE BLD-MCNC: 133 MG/DL (ref 65–99)
GLUCOSE BLD-MCNC: 139 MG/DL (ref 65–99)

## 2019-02-05 PROCEDURE — 0DTB0ZZ RESECTION OF ILEUM, OPEN APPROACH: ICD-10-PCS | Performed by: SURGERY

## 2019-02-05 PROCEDURE — 07BC0ZZ EXCISION OF PELVIS LYMPHATIC, OPEN APPROACH: ICD-10-PCS | Performed by: SURGERY

## 2019-02-05 PROCEDURE — 5A09357 ASSISTANCE WITH RESPIRATORY VENTILATION, LESS THAN 24 CONSECUTIVE HOURS, CONTINUOUS POSITIVE AIRWAY PRESSURE: ICD-10-PCS | Performed by: SURGERY

## 2019-02-05 PROCEDURE — 99253 IP/OBS CNSLTJ NEW/EST LOW 45: CPT | Performed by: INTERNAL MEDICINE

## 2019-02-05 RX ORDER — GABAPENTIN 300 MG/1
300 CAPSULE ORAL ONCE
Status: COMPLETED | OUTPATIENT
Start: 2019-02-05 | End: 2019-02-05

## 2019-02-05 RX ORDER — HEPARIN SODIUM 5000 [USP'U]/ML
5000 INJECTION, SOLUTION INTRAVENOUS; SUBCUTANEOUS ONCE
Status: COMPLETED | OUTPATIENT
Start: 2019-02-05 | End: 2019-02-05

## 2019-02-05 RX ORDER — BUPIVACAINE HYDROCHLORIDE 2.5 MG/ML
INJECTION, SOLUTION INFILTRATION; PERINEURAL AS NEEDED
Status: DISCONTINUED | OUTPATIENT
Start: 2019-02-05 | End: 2019-02-05 | Stop reason: HOSPADM

## 2019-02-05 RX ORDER — HYDROMORPHONE HYDROCHLORIDE 1 MG/ML
0.4 INJECTION, SOLUTION INTRAMUSCULAR; INTRAVENOUS; SUBCUTANEOUS EVERY 5 MIN PRN
Status: DISCONTINUED | OUTPATIENT
Start: 2019-02-05 | End: 2019-02-05 | Stop reason: HOSPADM

## 2019-02-05 RX ORDER — OXYCODONE HYDROCHLORIDE 5 MG/1
5 TABLET ORAL EVERY 4 HOURS PRN
Status: DISCONTINUED | OUTPATIENT
Start: 2019-02-05 | End: 2019-02-06

## 2019-02-05 RX ORDER — MIDAZOLAM HYDROCHLORIDE 1 MG/ML
1 INJECTION INTRAMUSCULAR; INTRAVENOUS EVERY 5 MIN PRN
Status: DISCONTINUED | OUTPATIENT
Start: 2019-02-05 | End: 2019-02-05 | Stop reason: HOSPADM

## 2019-02-05 RX ORDER — SODIUM CHLORIDE, SODIUM LACTATE, POTASSIUM CHLORIDE, CALCIUM CHLORIDE 600; 310; 30; 20 MG/100ML; MG/100ML; MG/100ML; MG/100ML
INJECTION, SOLUTION INTRAVENOUS CONTINUOUS
Status: DISCONTINUED | OUTPATIENT
Start: 2019-02-05 | End: 2019-02-05 | Stop reason: HOSPADM

## 2019-02-05 RX ORDER — ONDANSETRON 2 MG/ML
INJECTION INTRAMUSCULAR; INTRAVENOUS
Status: DISCONTINUED | OUTPATIENT
Start: 2019-02-05 | End: 2019-02-05 | Stop reason: HOSPADM

## 2019-02-05 RX ORDER — HYDROMORPHONE HYDROCHLORIDE 1 MG/ML
0.5 INJECTION, SOLUTION INTRAMUSCULAR; INTRAVENOUS; SUBCUTANEOUS EVERY 2 HOUR PRN
Status: DISCONTINUED | OUTPATIENT
Start: 2019-02-05 | End: 2019-02-06

## 2019-02-05 RX ORDER — NALOXONE HYDROCHLORIDE 0.4 MG/ML
80 INJECTION, SOLUTION INTRAMUSCULAR; INTRAVENOUS; SUBCUTANEOUS AS NEEDED
Status: DISCONTINUED | OUTPATIENT
Start: 2019-02-05 | End: 2019-02-05 | Stop reason: HOSPADM

## 2019-02-05 RX ORDER — ATORVASTATIN CALCIUM 20 MG/1
20 TABLET, FILM COATED ORAL NIGHTLY
Status: DISCONTINUED | OUTPATIENT
Start: 2019-02-05 | End: 2019-02-07

## 2019-02-05 RX ORDER — LOSARTAN POTASSIUM 50 MG/1
50 TABLET ORAL NIGHTLY
COMMUNITY
End: 2019-05-09

## 2019-02-05 RX ORDER — SODIUM CHLORIDE, SODIUM LACTATE, POTASSIUM CHLORIDE, CALCIUM CHLORIDE 600; 310; 30; 20 MG/100ML; MG/100ML; MG/100ML; MG/100ML
INJECTION, SOLUTION INTRAVENOUS CONTINUOUS
Status: DISCONTINUED | OUTPATIENT
Start: 2019-02-05 | End: 2019-02-06

## 2019-02-05 RX ORDER — ENOXAPARIN SODIUM 100 MG/ML
40 INJECTION SUBCUTANEOUS DAILY
Status: DISCONTINUED | OUTPATIENT
Start: 2019-02-06 | End: 2019-02-07

## 2019-02-05 RX ORDER — ACETAMINOPHEN 500 MG
1000 TABLET ORAL ONCE
Status: DISCONTINUED | OUTPATIENT
Start: 2019-02-05 | End: 2019-02-05

## 2019-02-05 RX ORDER — FAMOTIDINE 20 MG/1
20 TABLET ORAL 2 TIMES DAILY
Status: DISCONTINUED | OUTPATIENT
Start: 2019-02-05 | End: 2019-02-07

## 2019-02-05 RX ORDER — DEXAMETHASONE SODIUM PHOSPHATE 4 MG/ML
VIAL (ML) INJECTION
Status: DISCONTINUED | OUTPATIENT
Start: 2019-02-05 | End: 2019-02-05 | Stop reason: HOSPADM

## 2019-02-05 RX ORDER — SODIUM CHLORIDE 9 MG/ML
INJECTION, SOLUTION INTRAVENOUS CONTINUOUS
Status: DISCONTINUED | OUTPATIENT
Start: 2019-02-05 | End: 2019-02-05

## 2019-02-05 RX ORDER — HYDROMORPHONE HYDROCHLORIDE 1 MG/ML
1 INJECTION, SOLUTION INTRAMUSCULAR; INTRAVENOUS; SUBCUTANEOUS EVERY 2 HOUR PRN
Status: DISCONTINUED | OUTPATIENT
Start: 2019-02-05 | End: 2019-02-06

## 2019-02-05 RX ORDER — FAMOTIDINE 10 MG/ML
20 INJECTION, SOLUTION INTRAVENOUS 2 TIMES DAILY
Status: DISCONTINUED | OUTPATIENT
Start: 2019-02-05 | End: 2019-02-07

## 2019-02-05 RX ORDER — MEPERIDINE HYDROCHLORIDE 25 MG/ML
12.5 INJECTION INTRAMUSCULAR; INTRAVENOUS; SUBCUTANEOUS AS NEEDED
Status: DISCONTINUED | OUTPATIENT
Start: 2019-02-05 | End: 2019-02-05 | Stop reason: HOSPADM

## 2019-02-05 RX ORDER — SODIUM CHLORIDE, SODIUM LACTATE, POTASSIUM CHLORIDE, CALCIUM CHLORIDE 600; 310; 30; 20 MG/100ML; MG/100ML; MG/100ML; MG/100ML
INJECTION, SOLUTION INTRAVENOUS ONCE
Status: COMPLETED | OUTPATIENT
Start: 2019-02-05 | End: 2019-02-05

## 2019-02-05 RX ORDER — ONDANSETRON 2 MG/ML
4 INJECTION INTRAMUSCULAR; INTRAVENOUS EVERY 6 HOURS PRN
Status: DISCONTINUED | OUTPATIENT
Start: 2019-02-05 | End: 2019-02-07

## 2019-02-05 RX ORDER — DEXTROSE MONOHYDRATE 25 G/50ML
50 INJECTION, SOLUTION INTRAVENOUS
Status: DISCONTINUED | OUTPATIENT
Start: 2019-02-05 | End: 2019-02-05 | Stop reason: HOSPADM

## 2019-02-05 RX ORDER — FENOFIBRATE 134 MG/1
134 CAPSULE ORAL
Status: DISCONTINUED | OUTPATIENT
Start: 2019-02-06 | End: 2019-02-07

## 2019-02-05 RX ORDER — ACETAMINOPHEN 10 MG/ML
1000 INJECTION, SOLUTION INTRAVENOUS ONCE
Status: DISCONTINUED | OUTPATIENT
Start: 2019-02-05 | End: 2019-02-05 | Stop reason: HOSPADM

## 2019-02-05 RX ORDER — LOSARTAN POTASSIUM 50 MG/1
50 TABLET ORAL DAILY
Status: DISCONTINUED | OUTPATIENT
Start: 2019-02-06 | End: 2019-02-07

## 2019-02-05 RX ORDER — METRONIDAZOLE 500 MG/100ML
500 INJECTION, SOLUTION INTRAVENOUS ONCE
Status: COMPLETED | OUTPATIENT
Start: 2019-02-05 | End: 2019-02-05

## 2019-02-05 RX ORDER — ONDANSETRON 2 MG/ML
4 INJECTION INTRAMUSCULAR; INTRAVENOUS AS NEEDED
Status: DISCONTINUED | OUTPATIENT
Start: 2019-02-05 | End: 2019-02-05 | Stop reason: HOSPADM

## 2019-02-05 NOTE — ANESTHESIA POSTPROCEDURE EVALUATION
[facility] Status:  Surgery Admit   Age/Gender 60year old male MRN [id number]   [facility] SURGERY Attending Rivera Hui MD   Hosp Day # 0 PCP Padmini Ashraf MD       Anesthesia Post-op Note    Procedure(s):

## 2019-02-05 NOTE — INTERVAL H&P NOTE
There has been no significant change since the patient was seen as documented in EPIC. Surgery revisted. To proceed as planned.       Neli LAW PA-C

## 2019-02-05 NOTE — BRIEF OP NOTE
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

## 2019-02-06 LAB
ALBUMIN SERPL-MCNC: 2.9 G/DL (ref 3.1–4.5)
ALBUMIN/GLOB SERPL: 1 {RATIO} (ref 1–2)
ALP LIVER SERPL-CCNC: 35 U/L (ref 45–117)
ALT SERPL-CCNC: 33 U/L (ref 17–63)
ANION GAP SERPL CALC-SCNC: 3 MMOL/L (ref 0–18)
AST SERPL-CCNC: 30 U/L (ref 15–41)
BILIRUB SERPL-MCNC: 0.7 MG/DL (ref 0.1–2)
BUN BLD-MCNC: 16 MG/DL (ref 8–20)
BUN/CREAT SERPL: 13.6 (ref 10–20)
CALCIUM BLD-MCNC: 8.1 MG/DL (ref 8.3–10.3)
CHLORIDE SERPL-SCNC: 106 MMOL/L (ref 101–111)
CO2 SERPL-SCNC: 29 MMOL/L (ref 22–32)
CREAT BLD-MCNC: 1.18 MG/DL (ref 0.7–1.3)
DEPRECATED RDW RBC AUTO: 50.5 FL (ref 35.1–46.3)
ERYTHROCYTE [DISTWIDTH] IN BLOOD BY AUTOMATED COUNT: 15.9 % (ref 11–15)
GLOBULIN PLAS-MCNC: 2.9 G/DL (ref 2.8–4.4)
GLUCOSE BLD-MCNC: 102 MG/DL (ref 65–99)
GLUCOSE BLD-MCNC: 113 MG/DL (ref 65–99)
GLUCOSE BLD-MCNC: 120 MG/DL (ref 65–99)
GLUCOSE BLD-MCNC: 129 MG/DL (ref 70–99)
GLUCOSE BLD-MCNC: 93 MG/DL (ref 65–99)
GLUCOSE BLD-MCNC: 95 MG/DL (ref 65–99)
HCT VFR BLD AUTO: 30.3 % (ref 39–53)
HGB BLD-MCNC: 10.1 G/DL (ref 13–17.5)
M PROTEIN MFR SERPL ELPH: 5.8 G/DL (ref 6.4–8.2)
MCH RBC QN AUTO: 29.1 PG (ref 26–34)
MCHC RBC AUTO-ENTMCNC: 33.3 G/DL (ref 31–37)
MCV RBC AUTO: 87.3 FL (ref 80–100)
OSMOLALITY SERPL CALC.SUM OF ELEC: 289 MOSM/KG (ref 275–295)
PLATELET # BLD AUTO: 229 10(3)UL (ref 150–450)
POTASSIUM SERPL-SCNC: 4.7 MMOL/L (ref 3.6–5.1)
RBC # BLD AUTO: 3.47 X10(6)UL (ref 4.3–5.7)
SODIUM SERPL-SCNC: 138 MMOL/L (ref 136–144)
WBC # BLD AUTO: 6.4 X10(3) UL (ref 4–11)

## 2019-02-06 PROCEDURE — 99232 SBSQ HOSP IP/OBS MODERATE 35: CPT | Performed by: HOSPITALIST

## 2019-02-06 RX ORDER — OXYCODONE HYDROCHLORIDE 5 MG/1
5 TABLET ORAL EVERY 4 HOURS PRN
Qty: 20 TABLET | Refills: 0 | Status: SHIPPED | OUTPATIENT
Start: 2019-02-06 | End: 2019-02-19 | Stop reason: ALTCHOICE

## 2019-02-06 RX ORDER — TRAMADOL HYDROCHLORIDE 50 MG/1
50 TABLET ORAL EVERY 6 HOURS PRN
Status: DISCONTINUED | OUTPATIENT
Start: 2019-02-06 | End: 2019-02-07

## 2019-02-06 RX ORDER — ACETAMINOPHEN 325 MG/1
650 TABLET ORAL EVERY 6 HOURS PRN
Status: DISCONTINUED | OUTPATIENT
Start: 2019-02-06 | End: 2019-02-07

## 2019-02-06 NOTE — OPERATIVE REPORT
[address]    PATIENT'S NAME: [address] PHYSICIAN: Cirilo Arango. Zackary Cooks, MD   OPERATING PHYSICIAN: Cirilo Arango.  Zackary Cooks, MD   PATIENT ACCOUNT#:   [id number]    LOCATION:  [address] [place] [address]  MEDICAL RECORD #:   [id number]       DATE OF BIRTH:  [birth year] bowel adhesed loops were taken down using Harmonic scalpel. The transverse colon was mobilized. No gross disease could be noted. After thorough mobilization of the region of concern, the previous right upper quadrant scar was re-incised.   The abdominal tolerated the procedure well without immediate complications. He was extubated in the operating room and was sent in stable condition to the recovery room. Counts were correct. I was present throughout the procedure. Dictated By Irina Preciado MD

## 2019-02-06 NOTE — PROGRESS NOTES
POD #1 s/p laparoscopic ileocolic resection    Doing well on POD #1. Afebrile and hemodynamically stable. Low urinary output overnight. Responded well with bolus. Tolerating full liquids diet. No N/V.    Ambulating    /85 (BP Location: Right arm)

## 2019-02-06 NOTE — PROGRESS NOTES
[facility]  Progress Note     Marquez Cutlerkaci Patient Status:  Inpatient    1960 MRN [id number]   [facility] 7NE-A Attending Trina Marion MD   Hosp Day # 1 PCP Kelly Virgen MD     Chief Complaint: s/p cecum tumor resection 1. Carcinoid tumor of cecum is s/p lap ileocolic resection POD 1  2. HL - statin   3. MS- resume home med, monitor clinically closely. May need neuro eval if any sx of exacerbation occur   4. HTN  5. Expected acute blood loss anemia  1.  Await return

## 2019-02-06 NOTE — RESPIRATORY THERAPY NOTE
CAROLINA Equipment Usage Summary :            Set Mode : CPAP W FLEX          Usage in Hours:7;45          90% Pressure (EPAP) : 10           90% Insp Pressure (IPAP);           AHI : 4.7          Supplemental Oxygen :   3   LPM

## 2019-02-06 NOTE — PLAN OF CARE
Assumed care at [address]. Pt a/ox4. Spouse at [facility].  VSS, NSR per tele. O2 at 3l/nc in use, cpap at night. Denies need for pain meds. Abd 5 lap sites w/ telfa and tegaderm in place, CDI. Abd +BS. Denies flatus. SCD's in place.   Cerda catheter intact draining

## 2019-02-06 NOTE — PLAN OF CARE
Assumed care @ 7633. A&Ox4, RA, SR, tolerating minimal pain. POD#1 post laparoscopic ileocolic resection. Tolerating diet. Discontinued IVF. 5 Lap sites c/d/i with dressings. Discussed plan of care with patient and wife. Will continue to monitor.

## 2019-02-06 NOTE — PROGRESS NOTES
02/06/19 0731   Clinical Encounter Type   Visited With Patient and family together  (WIFE)   Routine Visit Introduction   Continue Visiting No   Surgical Visit Post-op   Patient Spiritual Encounters   Spiritual Assessment Completed No   Spiritual Jesica Warner

## 2019-02-06 NOTE — CONSULTS
[facility]  [address] Patient Status:  Inpatient    1960 MRN [id number]   [facility] 7NE-A Attending Faith Linda MD   Hosp Day # 0 PCP Rashard Thornton MD     Reason for consult: medical management     Reque and pts uncle   • Other (multiple sclerosis) Mother    • Other (Other) Mother         MS   • Other Mother         MS   • Other (breast cancer) Maternal Grandmother    • Other (heart attack) Paternal Grandmother 79       Allergies: No Known Allergies mucous membranes. EOM-I. PERRLA. Anicteric. Neck: No lymphadenopathy. No JVD. No carotid bruits. Respiratory: Clear to auscultation bilaterally. No wheezes. No rhonchi. Cardiovascular: S1, S2. Regular rate and rhythm. No murmurs, rubs or gallops.  Equal

## 2019-02-06 NOTE — PAYOR COMM NOTE
--------------  ADMISSION REVIEW     Payor: [employer]  Subscriber #:  [id number]  Authorization Number: [id number]    Admit date: 2/5/19  Admit time: [address]       Admitting Physician: Carolyn Lainez MD  Attending Physician:  Carolyn Lainez MD  Primary Care UNIT/ML injection 5,000 Units     Date Action Dose Route User    2/5/2019 1113 Given 5000 Units Subcutaneous (Left Lower Abdomen) Mike Cardenas RN      lactated ringers infusion     Date Action Dose Route User    2/6/2019 0517 New Bag (none) Intravenous

## 2019-02-07 VITALS
BODY MASS INDEX: 27.58 KG/M2 | WEIGHT: 203.63 LBS | OXYGEN SATURATION: 99 % | TEMPERATURE: 99 F | SYSTOLIC BLOOD PRESSURE: 136 MMHG | HEART RATE: 89 BPM | HEIGHT: 72 IN | RESPIRATION RATE: 18 BRPM | DIASTOLIC BLOOD PRESSURE: 76 MMHG

## 2019-02-07 LAB
ANION GAP SERPL CALC-SCNC: 4 MMOL/L (ref 0–18)
BUN BLD-MCNC: 22 MG/DL (ref 8–20)
BUN/CREAT SERPL: 20.4 (ref 10–20)
C DIFF TOX B STL QL: NEGATIVE
CALCIUM BLD-MCNC: 8.2 MG/DL (ref 8.3–10.3)
CHLORIDE SERPL-SCNC: 109 MMOL/L (ref 101–111)
CO2 SERPL-SCNC: 29 MMOL/L (ref 22–32)
CREAT BLD-MCNC: 1.08 MG/DL (ref 0.7–1.3)
DEPRECATED RDW RBC AUTO: 50 FL (ref 35.1–46.3)
ERYTHROCYTE [DISTWIDTH] IN BLOOD BY AUTOMATED COUNT: 16 % (ref 11–15)
GLUCOSE BLD-MCNC: 100 MG/DL (ref 65–99)
GLUCOSE BLD-MCNC: 109 MG/DL (ref 70–99)
GLUCOSE BLD-MCNC: 99 MG/DL (ref 65–99)
HCT VFR BLD AUTO: 28.4 % (ref 39–53)
HGB BLD-MCNC: 9.1 G/DL (ref 13–17.5)
MCH RBC QN AUTO: 28.1 PG (ref 26–34)
MCHC RBC AUTO-ENTMCNC: 32 G/DL (ref 31–37)
MCV RBC AUTO: 87.7 FL (ref 80–100)
OSMOLALITY SERPL CALC.SUM OF ELEC: 298 MOSM/KG (ref 275–295)
PLATELET # BLD AUTO: 243 10(3)UL (ref 150–450)
POTASSIUM SERPL-SCNC: 3.7 MMOL/L (ref 3.6–5.1)
RBC # BLD AUTO: 3.24 X10(6)UL (ref 4.3–5.7)
SODIUM SERPL-SCNC: 142 MMOL/L (ref 136–144)
WBC # BLD AUTO: 4.9 X10(3) UL (ref 4–11)

## 2019-02-07 PROCEDURE — 99232 SBSQ HOSP IP/OBS MODERATE 35: CPT | Performed by: HOSPITALIST

## 2019-02-07 RX ORDER — POTASSIUM CHLORIDE 20 MEQ/1
40 TABLET, EXTENDED RELEASE ORAL ONCE
Status: COMPLETED | OUTPATIENT
Start: 2019-02-07 | End: 2019-02-07

## 2019-02-07 RX ORDER — CHOLESTYRAMINE 4 G/9G
4 POWDER, FOR SUSPENSION ORAL 2 TIMES DAILY
Qty: 14 PACKET | Refills: 0 | Status: SHIPPED | OUTPATIENT
Start: 2019-02-07 | End: 2019-02-14

## 2019-02-07 NOTE — RESPIRATORY THERAPY NOTE
CAROLINA : EQUIPMENT USE: DAILY SUMMARY                                            SET MODE:  CPAP WITH CFLEX                                          USAGE IN HOURS:8:15                                          90% EXP

## 2019-02-07 NOTE — PAYOR COMM NOTE
--------------  CONTINUED STAY REVIEW    Payor: [employer]  Subscriber #:  [id number]  Authorization Number: [id number]    Admit date: 2/5/19  Admit time: 1703    Admitting Physician: Noris Sadler MD  Attending Physician:  Noris Sadler MD  Primary Care 20 mg Intravenous BID   • fenofibrate micronized  134 mg Oral Daily with breakfast   • Losartan Potassium  50 mg Oral Daily   • atorvastatin  20 mg Oral Nightly   • Teriflunomide  14 mg Oral Daily         ASSESSMENT / PLAN:         1.  Carcinoid tumor of c Date Action Dose Route User    2/7/2019 1127 Given 40 mEq Oral Jerry Sawant RN      Teriflunomide TABS 14 mg     Date Action Dose Route User    2/6/2019 1718 Given 14 mg Oral Jerry Sawant RN

## 2019-02-07 NOTE — PROGRESS NOTES
POD #2 s/p laparoscopic ileocolic resection    Doing well on POD #2. Afebrile and hemodynamically stable. Good urinary output  Tolerating low fiber diet. No N/V  + flatus, 2 loose BMs overnight. Continue to monitor.    Ambulating    /85 (BP Location

## 2019-02-07 NOTE — PLAN OF CARE
Assumed care at [address]. Pt a/ox4. Spouse at [facility].  VSS, NSR per tele. RA, cpap at night. Denies pain. Abd 5 lap sites w/ telfa and tegaderm in place, CDI. Abd +BS. SCD's in place. Meds given per MAR. Pt ambulating well.   Voiding good amount of clear yell

## 2019-02-07 NOTE — PROGRESS NOTES
[facility]  Progress Note     Surjit Riddle Patient Status:  Inpatient    1960 MRN [id number]   [facility] 7NE-A Attending Elina Rivas MD   Hosp Day # 2 PCP Mimi Stout MD     Chief Complaint: s/p cecum tumor resection Potassium  50 mg Oral Daily   • atorvastatin  20 mg Oral Nightly   • Teriflunomide  14 mg Oral Daily       ASSESSMENT / PLAN:       1. Carcinoid tumor of cecum is s/p lap ileocolic resection POD 2  2. HL - statin   3.  MS- resume home med, monitor clinicall

## 2019-02-08 ENCOUNTER — PATIENT OUTREACH (OUTPATIENT)
Dept: CASE MANAGEMENT | Age: 59
End: 2019-02-08

## 2019-02-08 ENCOUNTER — TELEPHONE (OUTPATIENT)
Dept: INTERNAL MEDICINE CLINIC | Facility: CLINIC | Age: 59
End: 2019-02-08

## 2019-02-08 ENCOUNTER — TELEPHONE (OUTPATIENT)
Dept: SURGERY | Facility: CLINIC | Age: 59
End: 2019-02-08

## 2019-02-08 DIAGNOSIS — Z02.9 ENCOUNTERS FOR UNSPECIFIED ADMINISTRATIVE PURPOSE: ICD-10-CM

## 2019-02-08 DIAGNOSIS — D3A.021 CARCINOID TUMOR OF CECUM: ICD-10-CM

## 2019-02-08 PROCEDURE — 1111F DSCHRG MED/CURRENT MED MERGE: CPT

## 2019-02-08 NOTE — PROGRESS NOTES
Initial Post Discharge Follow Up   Discharge Date: 2/7/19  Contact Date: 2/8/2019    Consent Verification:  Assessment Completed With: Patient  HIPAA Verified?   Yes    Discharge Dx:    Carcinoid tumor of Cecum, S/P Laparoscopic small bowel resection packet before breakfast and one before dinner Disp: 14 packet Rfl: 0   OxyCODONE HCl 5 MG Oral Tab Take 1 tablet (5 mg total) by mouth every 4 (four) hours as needed. Disp: 20 tablet Rfl: 0   Losartan Potassium 50 MG Oral Tab Take 50 mg by mouth daily.  Dis No    Referrals/orders at D/C:  Home Health ordered at D/C? No     DME ordered at D/C? No    Services ordered at D/C?   No, not at this time     Needs post D/C:   Now that you are home, are there any needs or concerns you need addressed before your next vis updates to medications and or orders sent to PCP.      For patients with TCC appointments:     []  Advised patient to bring all medications and blood glucose meter/supplies if applicable

## 2019-02-08 NOTE — TELEPHONE ENCOUNTER
Patient reports he is doing well, denies fever, pain is well controlled although is still complaining of diarrhea. He has taken 2 doses of Questran without much improvement.  Advised patient to continue with Halley Handing and if no improvement by evening to try

## 2019-02-08 NOTE — TELEPHONE ENCOUNTER
Patient was discharged home from [facility] on 2/7/19 and is at Moderate risk for readmission and recommended to have a TCM HFU by 2/21/19 or sooner.  [facility] and [facility] confirmed patient has a post-op visit with Joycelyn Trejo NP on 2/27/19 which is outside the [address] [place] [address]

## 2019-02-11 ENCOUNTER — TELEPHONE (OUTPATIENT)
Dept: SURGERY | Facility: CLINIC | Age: 59
End: 2019-02-11

## 2019-02-11 NOTE — DISCHARGE SUMMARY
[facility]  Discharge Summary    Dominique Casanova Patient Status:  Inpatient    1960 MRN [id number]   [facility] 7NE-A Attending No att. providers found   Hosp Day # 2 PCP Kristin Constantino MD     Date of Admission: 2019    Date of gallium scan: The previously noted focus of abnormal Ga-68 Netspot (Dotatate) activity in the right lower quadrant associated with distal small bowel is redemonstrated however less intense on present exam now with peak SUV of 4.35 previously 8.8.      Helen R-0    Pravastatin Sodium 80 MG Oral Tab  Take 1 tablet (80 mg total) by mouth once daily. , Normal, Disp-90 tablet, R-0    Oxybutynin Chloride ER 10 MG Oral Tablet 24 Hr  Take 10 mg by mouth as needed.  , Historical, R-10    Cholecalciferol (VITAMIN D) 200

## 2019-02-11 NOTE — TELEPHONE ENCOUNTER
FYI pt scheduled HFU apt with Maria Esther Avendano on 02-19-19. Please advise if apt 02-27-19 can be cancelled as pt stated HFU was related to post-op. Thank you!

## 2019-02-11 NOTE — TELEPHONE ENCOUNTER
Please see Rosalia and Heathers note below. Okay to cancel 2/27/19 and keep 2/19/19 with [employer]? Routed to [employer].

## 2019-02-11 NOTE — TELEPHONE ENCOUNTER
Call to f/u with patient regarding diarrhea. Patient reports some improvement. States stool frequency has slowed and has become more formed but reports it's still on the looser side. Denies fever, chills and reports he is still taking Questran.  Post op [place]lin

## 2019-02-14 ENCOUNTER — OFFICE VISIT (OUTPATIENT)
Dept: SURGERY | Facility: CLINIC | Age: 59
End: 2019-02-14
Payer: COMMERCIAL

## 2019-02-14 VITALS
HEIGHT: 72 IN | HEART RATE: 81 BPM | TEMPERATURE: 98 F | RESPIRATION RATE: 18 BRPM | SYSTOLIC BLOOD PRESSURE: 132 MMHG | BODY MASS INDEX: 26.01 KG/M2 | WEIGHT: 192 LBS | DIASTOLIC BLOOD PRESSURE: 71 MMHG | OXYGEN SATURATION: 100 %

## 2019-02-14 DIAGNOSIS — D3A.021 CARCINOID TUMOR OF CECUM: Primary | ICD-10-CM

## 2019-02-14 PROCEDURE — 99024 POSTOP FOLLOW-UP VISIT: CPT | Performed by: PHYSICIAN ASSISTANT

## 2019-02-14 NOTE — PROGRESS NOTES
[facility]    Patient Name:  Marquez Bell   YOB: 1960   Gender:  Male   Appt Date:  2/14/2019   Provider:  DAO Gonzalez   Insurance:  Roberto Persaud MD   Address: 180 associated with distal small bowel is redemonstrated however less intense on present exam now with peak SUV of 4.35 previously 8.8.      Patient was 'lost' for follow-up.      1/8/2019: Small focus of activity right lower quadrant SUV maximum 8.9, which berlin needed. , Disp: , Rfl: 10  •  Vardenafil HCl (LEVITRA) 20 MG Oral Tab, Take 1 tablet (20 mg total) by mouth daily as needed for Erectile Dysfunction. , Disp: 10 tablet, Rfl: 11  •  Cholecalciferol (VITAMIN D) 2000 UNITS Oral Cap, Take 2,000 Units by mouth (melanoma) Father         and pts uncle   • Other (multiple sclerosis) Mother    • Other (Other) Mother         MS   • Other Mother         MS   • Other (breast cancer) Maternal Grandmother    • Other (heart attack) Paternal Grandmother 79      Reviewed:

## 2019-02-19 ENCOUNTER — OFFICE VISIT (OUTPATIENT)
Dept: INTERNAL MEDICINE CLINIC | Facility: CLINIC | Age: 59
End: 2019-02-19
Payer: COMMERCIAL

## 2019-02-19 VITALS
HEART RATE: 78 BPM | WEIGHT: 193.19 LBS | RESPIRATION RATE: 16 BRPM | DIASTOLIC BLOOD PRESSURE: 60 MMHG | BODY MASS INDEX: 26.17 KG/M2 | SYSTOLIC BLOOD PRESSURE: 118 MMHG | HEIGHT: 72 IN | TEMPERATURE: 97 F | OXYGEN SATURATION: 99 %

## 2019-02-19 DIAGNOSIS — D62 ACUTE BLOOD LOSS AS CAUSE OF POSTOPERATIVE ANEMIA: ICD-10-CM

## 2019-02-19 DIAGNOSIS — Z90.49 S/P SMALL BOWEL RESECTION: Primary | ICD-10-CM

## 2019-02-19 DIAGNOSIS — D3A.021 CARCINOID TUMOR OF CECUM: ICD-10-CM

## 2019-02-19 DIAGNOSIS — R19.7 DIARRHEA, UNSPECIFIED TYPE: ICD-10-CM

## 2019-02-19 DIAGNOSIS — I10 ESSENTIAL HYPERTENSION: ICD-10-CM

## 2019-02-19 DIAGNOSIS — E11.9 TYPE 2 DIABETES MELLITUS WITHOUT COMPLICATION, WITHOUT LONG-TERM CURRENT USE OF INSULIN (HCC): ICD-10-CM

## 2019-02-19 PROCEDURE — 99495 TRANSJ CARE MGMT MOD F2F 14D: CPT | Performed by: NURSE PRACTITIONER

## 2019-02-19 NOTE — PROGRESS NOTES
HPI:    Jose Rivera is a 60year old male here today for hospital follow up.    He was discharged from Inpatient hospital, [facility] to Home   Admission Date: 2/5/19   Discharge Date: 2/7/19  Hospital Discharge Diagnoses (since 1/20/2019)   None only. Not checking BP at home either. Denies chest pain, palpitations, SOB, COLEMAN, headaches, dizziness, lightheadedness, visual changes. Allergies:  He has No Known Allergies.     Current Meds:    Current Outpatient Medications on File Prior to Visit:  L LAPAROSCOPIC COLON RESECTION - RIGHT (N/A, 2/5/2019). He family history includes Cancer in his father; Diabetes in his maternal grandfather;  Other in his mother and mother; breast cancer in his maternal grandmother; heart attack (age of onset: 79) in hi tender, FROM of the extremities  EXTREMITIES: no cyanosis, clubbing or edema. Diabetic foot exam: Bilateral feet visually inspected and the appearance is normal, barefoot skin exam is normal, no wounds.   Bilateral barefoot monofilament exam completed and d Data to Be Reviewed: moderate  · Risk of Significant Complications, Morbidity, and/or Mortality: moderate    Overall Risk:   moderate    Patient seen within 14 days from date of discharge.      Froilan Dalal NP, 2/19/2019

## 2019-03-05 ENCOUNTER — HOSPITAL ENCOUNTER (EMERGENCY)
Facility: HOSPITAL | Age: 59
Discharge: HOME | End: 2019-03-06
Attending: EMERGENCY MEDICINE
Payer: COMMERCIAL

## 2019-03-05 VITALS
BODY MASS INDEX: 26.28 KG/M2 | RESPIRATION RATE: 18 BRPM | OXYGEN SATURATION: 100 % | TEMPERATURE: 98 F | HEIGHT: 72 IN | HEART RATE: 69 BPM | DIASTOLIC BLOOD PRESSURE: 73 MMHG | WEIGHT: 194 LBS | SYSTOLIC BLOOD PRESSURE: 152 MMHG

## 2019-03-05 DIAGNOSIS — K43.9 HERNIA OF ANTERIOR ABDOMINAL WALL: Primary | ICD-10-CM

## 2019-03-05 PROCEDURE — 99283 EMERGENCY DEPT VISIT LOW MDM: CPT

## 2019-03-06 ENCOUNTER — OFFICE VISIT (OUTPATIENT)
Dept: SURGERY | Facility: CLINIC | Age: 59
End: 2019-03-06
Payer: COMMERCIAL

## 2019-03-06 VITALS
BODY MASS INDEX: 26.5 KG/M2 | RESPIRATION RATE: 18 BRPM | TEMPERATURE: 97 F | HEART RATE: 75 BPM | SYSTOLIC BLOOD PRESSURE: 143 MMHG | HEIGHT: 72.01 IN | DIASTOLIC BLOOD PRESSURE: 88 MMHG | WEIGHT: 195.63 LBS | OXYGEN SATURATION: 100 %

## 2019-03-06 DIAGNOSIS — K43.2 INCISIONAL HERNIA, WITHOUT OBSTRUCTION OR GANGRENE: Primary | ICD-10-CM

## 2019-03-06 DIAGNOSIS — D3A.021 CARCINOID TUMOR OF CECUM: ICD-10-CM

## 2019-03-06 PROCEDURE — 99024 POSTOP FOLLOW-UP VISIT: CPT | Performed by: SURGERY

## 2019-03-06 NOTE — ED INITIAL ASSESSMENT (HPI)
Pt here with c/o possible hernia around incision site from a bowel resection 1 month ago. Pt states he sneezed real hard and then felt it pop out.

## 2019-03-06 NOTE — H&P (VIEW-ONLY)
[facility]cology    Patient Name:  Vel Cardenas   YOB: 1960   Gender:  Male   Appt Date:  3/6/2019   Provider:  Nydia Park MD   Insurance:  Carroll Jimenez MD   Address: 6012 previously noted focus of abnormal Ga-68 Netspot (Dotatate) activity in the right lower quadrant associated with distal small bowel is redemonstrated however less intense on present exam now with peak SUV of 4.35 previously 8.8.      Patient was 'lost' for (VITAMIN D) 2000 UNITS Oral Cap, Take 2,000 Units by mouth daily. , Disp: , Rfl:   •  Cyanocobalamin (VITAMIN B 12 OR), Take 1 tablet by mouth daily. , Disp: , Rfl:   •  Teriflunomide 14 MG Oral Tab, Take 14 mg by mouth daily.   , Disp: , Rfl:   •  Psylli MS   • Other (breast cancer) Maternal Grandmother    • Other (heart attack) Paternal Grandmother 79      Reviewed:       Review of Systems:  GENERAL HEALTH: feels well, no fatigue.    HEENT: denies pain  RESPIRATORY: denies shortness of breath, wheez

## 2019-03-06 NOTE — ED PROVIDER NOTES
Patient Seen in: [facility] Emergency Department    History   Patient presents with:  Abdomen/Flank Pain (GI/)    Stated Complaint: lump to previous surgical site     HPI    25-year-old male with a history of a carcinoid tumor of the colon, status anastomosis   • TONSILLECTOMY     • VASECTOMY  1990           Social History    Tobacco Use      Smoking status: Never Smoker      Smokeless tobacco: Never Used    Alcohol use:  Yes      Alcohol/week: 0.0 oz      Comment: 2 to 3 beers a week    Drug use: No instructed to follow-up with Dr. Herminia Rg and call tomorrow for an appointment. Dr. Keaton Lei was consulted from the emergency department and agrees with the plan. MDM   Patient was screened and evaluated during this visit.    As a treating physician danielle

## 2019-03-06 NOTE — PATIENT INSTRUCTIONS
Surgery: Hernia repair with possible Mesh    Date of Surgery: 3/8/19    Hosptial: [address] [place], [address]  Phone: [phone]      · This is an  outpatient procedure.   · Use the provided Chlorhexa to see you prior to surgery. · If this is an inpatient surgery, attending the [address] Surgical Oncology Pre-operative Education Class is strongly encouraged. Email [email or web address] to RSVP or for more class information.        Pre-Operative Testing

## 2019-03-06 NOTE — H&P
[facility]    Patient Name:  Amador Area   YOB: 1960   Gender:  Male   Appt Date:  3/6/2019   Provider:  Femi Avalos MD   Insurance:  Theresa Edmonds MD   Address: 9[address] previously noted focus of abnormal Ga-68 Netspot (Dotatate) activity in the right lower quadrant associated with distal small bowel is redemonstrated however less intense on present exam now with peak SUV of 4.35 previously 8.8.      Patient was 'lost' for (VITAMIN D) 2000 UNITS Oral Cap, Take 2,000 Units by mouth daily. , Disp: , Rfl:   •  Cyanocobalamin (VITAMIN B 12 OR), Take 1 tablet by mouth daily. , Disp: , Rfl:   •  Teriflunomide 14 MG Oral Tab, Take 14 mg by mouth daily.   , Disp: , Rfl:   •  Psylli MS   • Other (breast cancer) Maternal Grandmother    • Other (heart attack) Paternal Grandmother 79      Reviewed:       Review of Systems:  GENERAL HEALTH: feels well, no fatigue.    HEENT: denies pain  RESPIRATORY: denies shortness of breath, wheez

## 2019-03-07 DIAGNOSIS — K43.2 INCISIONAL HERNIA WITHOUT OBSTRUCTION OR GANGRENE: Primary | ICD-10-CM

## 2019-03-07 DIAGNOSIS — K43.2 INCISIONAL HERNIA, WITHOUT OBSTRUCTION OR GANGRENE: Primary | ICD-10-CM

## 2019-03-07 RX ORDER — OXYBUTYNIN CHLORIDE 5 MG/1
10 TABLET ORAL AS NEEDED
COMMUNITY

## 2019-03-08 ENCOUNTER — HOSPITAL ENCOUNTER (OUTPATIENT)
Facility: HOSPITAL | Age: 59
Setting detail: HOSPITAL OUTPATIENT SURGERY
Discharge: HOME | End: 2019-03-08
Attending: SURGERY | Admitting: SURGERY
Payer: COMMERCIAL

## 2019-03-08 ENCOUNTER — ANESTHESIA (OUTPATIENT)
Dept: SURGERY | Facility: HOSPITAL | Age: 59
End: 2019-03-08
Payer: COMMERCIAL

## 2019-03-08 ENCOUNTER — ANESTHESIA EVENT (OUTPATIENT)
Dept: SURGERY | Facility: HOSPITAL | Age: 59
End: 2019-03-08
Payer: COMMERCIAL

## 2019-03-08 VITALS
HEART RATE: 76 BPM | DIASTOLIC BLOOD PRESSURE: 91 MMHG | BODY MASS INDEX: 26.04 KG/M2 | OXYGEN SATURATION: 100 % | TEMPERATURE: 99 F | HEIGHT: 72 IN | RESPIRATION RATE: 20 BRPM | SYSTOLIC BLOOD PRESSURE: 148 MMHG | WEIGHT: 192.25 LBS

## 2019-03-08 DIAGNOSIS — K43.2 INCISIONAL HERNIA, WITHOUT OBSTRUCTION OR GANGRENE: ICD-10-CM

## 2019-03-08 PROCEDURE — 0WQF0ZZ REPAIR ABDOMINAL WALL, OPEN APPROACH: ICD-10-PCS | Performed by: SURGERY

## 2019-03-08 RX ORDER — MIDAZOLAM HYDROCHLORIDE 1 MG/ML
1 INJECTION INTRAMUSCULAR; INTRAVENOUS EVERY 5 MIN PRN
Status: DISCONTINUED | OUTPATIENT
Start: 2019-03-08 | End: 2019-03-08

## 2019-03-08 RX ORDER — TRAMADOL HYDROCHLORIDE 50 MG/1
TABLET ORAL EVERY 6 HOURS PRN
Qty: 30 TABLET | Refills: 0 | Status: SHIPPED | OUTPATIENT
Start: 2019-03-08 | End: 2019-10-21

## 2019-03-08 RX ORDER — SODIUM CHLORIDE, SODIUM LACTATE, POTASSIUM CHLORIDE, CALCIUM CHLORIDE 600; 310; 30; 20 MG/100ML; MG/100ML; MG/100ML; MG/100ML
INJECTION, SOLUTION INTRAVENOUS CONTINUOUS
Status: DISCONTINUED | OUTPATIENT
Start: 2019-03-08 | End: 2019-03-08

## 2019-03-08 RX ORDER — NALOXONE HYDROCHLORIDE 0.4 MG/ML
80 INJECTION, SOLUTION INTRAMUSCULAR; INTRAVENOUS; SUBCUTANEOUS AS NEEDED
Status: DISCONTINUED | OUTPATIENT
Start: 2019-03-08 | End: 2019-03-08

## 2019-03-08 RX ORDER — CEFAZOLIN SODIUM/WATER 2 G/20 ML
2 SYRINGE (ML) INTRAVENOUS ONCE
Status: DISCONTINUED | OUTPATIENT
Start: 2019-03-08 | End: 2019-03-08 | Stop reason: HOSPADM

## 2019-03-08 RX ORDER — MEPERIDINE HYDROCHLORIDE 25 MG/ML
12.5 INJECTION INTRAMUSCULAR; INTRAVENOUS; SUBCUTANEOUS AS NEEDED
Status: DISCONTINUED | OUTPATIENT
Start: 2019-03-08 | End: 2019-03-08

## 2019-03-08 RX ORDER — HYDROCODONE BITARTRATE AND ACETAMINOPHEN 5; 325 MG/1; MG/1
2 TABLET ORAL AS NEEDED
Status: DISCONTINUED | OUTPATIENT
Start: 2019-03-08 | End: 2019-03-08

## 2019-03-08 RX ORDER — ONDANSETRON 2 MG/ML
4 INJECTION INTRAMUSCULAR; INTRAVENOUS AS NEEDED
Status: DISCONTINUED | OUTPATIENT
Start: 2019-03-08 | End: 2019-03-08

## 2019-03-08 RX ORDER — HYDROMORPHONE HYDROCHLORIDE 1 MG/ML
0.4 INJECTION, SOLUTION INTRAMUSCULAR; INTRAVENOUS; SUBCUTANEOUS EVERY 5 MIN PRN
Status: DISCONTINUED | OUTPATIENT
Start: 2019-03-08 | End: 2019-03-08

## 2019-03-08 RX ORDER — HYDROCODONE BITARTRATE AND ACETAMINOPHEN 5; 325 MG/1; MG/1
1 TABLET ORAL AS NEEDED
Status: DISCONTINUED | OUTPATIENT
Start: 2019-03-08 | End: 2019-03-08

## 2019-03-08 RX ORDER — DIPHENHYDRAMINE HYDROCHLORIDE 50 MG/ML
12.5 INJECTION INTRAMUSCULAR; INTRAVENOUS AS NEEDED
Status: DISCONTINUED | OUTPATIENT
Start: 2019-03-08 | End: 2019-03-08

## 2019-03-08 RX ORDER — OXYCODONE HYDROCHLORIDE 5 MG/1
5 TABLET ORAL EVERY 4 HOURS PRN
Status: DISCONTINUED | OUTPATIENT
Start: 2019-03-08 | End: 2019-03-08

## 2019-03-08 RX ORDER — ACETAMINOPHEN 500 MG
1000 TABLET ORAL ONCE
Status: DISCONTINUED | OUTPATIENT
Start: 2019-03-08 | End: 2019-03-08 | Stop reason: HOSPADM

## 2019-03-08 RX ORDER — BUPIVACAINE HYDROCHLORIDE 2.5 MG/ML
INJECTION, SOLUTION INFILTRATION; PERINEURAL AS NEEDED
Status: DISCONTINUED | OUTPATIENT
Start: 2019-03-08 | End: 2019-03-08 | Stop reason: HOSPADM

## 2019-03-08 RX ORDER — DEXTROSE MONOHYDRATE 25 G/50ML
50 INJECTION, SOLUTION INTRAVENOUS
Status: DISCONTINUED | OUTPATIENT
Start: 2019-03-08 | End: 2019-03-08

## 2019-03-08 NOTE — ANESTHESIA PREPROCEDURE EVALUATION
PRE-OP EVALUATION    Patient Name: Marquez Bell    Pre-op Diagnosis: Incisional hernia, without obstruction or gangrene [K43.2]    Procedure(s):   HERNIA REPAIR OF RIGHT MID ABDOMEN WITH POSSIBLE MESH    Surgeon(s) and Role:     Lyndon Crook MD - Meseret Camilo normal. Wall thickness was normal.     Systolic function was vigorous. The estimated ejection fraction was     65-70%. No diagnostic evidence for regional wall motion abnormalities. 2. Left atrium: The left atrium was mildly dilated.   3. Right ventricle: 02/07/2019     02/07/2019    CO2 29.0 02/07/2019    BUN 22 (H) 02/07/2019    CREATSERUM 1.08 02/07/2019     (H) 02/07/2019    CA 8.2 (L) 02/07/2019            Airway      Mallampati: I  Mouth opening: 3 FB  TM distance: 4 - 6 cm   Cardiovascul

## 2019-03-08 NOTE — OPERATIVE REPORT
[facility]    PATIENT'S NAME: [address] PHYSICIAN: Jerrod Hanks. Jenniffer Peña MD   OPERATING PHYSICIAN: Jerrod Hanks.  Jenniffer Peña MD   PATIENT ACCOUNT#:   [id number]    LOCATION:  [place] [address] ED 10  MEDICAL RECORD #:   [id number]       DATE condition to recovery room. Counts were correct. I was present throughout the procedure. Dictated By Alfonso Flores MD  d: 03/08/2019 16:31:04  t: 03/08/2019 17:24:51  [facility] [id number]  HDY/

## 2019-03-08 NOTE — BRIEF OP NOTE
Pre-Operative Diagnosis: Incisional hernia, without obstruction or gangrene [K43.2]     Post-Operative Diagnosis: Incisional hernia, without obstruction or gangrene [K43.2]      Procedure Performed:    HERNIA REPAIR OF RIGHT MID ABDOMEN     Surgeon(s) and HENOK

## 2019-03-08 NOTE — INTERVAL H&P NOTE
There has been no significant change since the patient was seen as documented in EPIC. Surgery revisted. To proceed as planned.       Eli LAW, PA-C

## 2019-03-08 NOTE — ANESTHESIA POSTPROCEDURE EVALUATION
[facility]  [address] Patient Status:  Hospital Outpatient Surgery   Age/Gender 60year old male MRN [id number]   [facility] [place] SURGERY Attending Mitchell Huggins MD   Hosp Day # 0 PCP Marlinda Curling, MD       Anesthesia Post-op Note

## 2019-03-11 ENCOUNTER — TELEPHONE (OUTPATIENT)
Dept: SURGERY | Facility: CLINIC | Age: 59
End: 2019-03-11

## 2019-03-11 NOTE — TELEPHONE ENCOUNTER
Talked to patient who states he is doing well. Pain rated as 2/10. No warmth, erythema, or drainage from site of incision. He is using his abdominal binder with activity. No fevers, chills, chest pain, SOB, abdominal pain, or N/V.  Post-op appointment sched

## 2019-03-14 ENCOUNTER — OFFICE VISIT (OUTPATIENT)
Dept: SURGERY | Facility: CLINIC | Age: 59
End: 2019-03-14
Payer: COMMERCIAL

## 2019-03-14 VITALS
HEART RATE: 78 BPM | SYSTOLIC BLOOD PRESSURE: 139 MMHG | HEIGHT: 72 IN | DIASTOLIC BLOOD PRESSURE: 85 MMHG | BODY MASS INDEX: 26.01 KG/M2 | OXYGEN SATURATION: 99 % | TEMPERATURE: 98 F | WEIGHT: 192 LBS | RESPIRATION RATE: 18 BRPM

## 2019-03-14 DIAGNOSIS — Z98.890 HISTORY OF INCISIONAL HERNIA REPAIR: Primary | ICD-10-CM

## 2019-03-14 DIAGNOSIS — Z87.19 HISTORY OF INCISIONAL HERNIA REPAIR: Primary | ICD-10-CM

## 2019-03-14 PROCEDURE — 99024 POSTOP FOLLOW-UP VISIT: CPT | Performed by: PHYSICIAN ASSISTANT

## 2019-03-15 NOTE — PROGRESS NOTES
[facility]    Patient Name:  Andrea Mckeon   YOB: 1960   Gender:  Male   Appt Date:  3/35/2019   Provider:  Jessica Matute MD   Insurance:  Antionette Fermin MD   Address: [address] [place] ([place]) activity in the right lower quadrant associated with distal small bowel is redemonstrated however less intense on present exam now with peak SUV of 4.35 previously 8.8.      Patient was 'lost' for follow-up.      1/8/2019:  Dysfunction. , Disp: 10 tablet, Rfl: 11  •  Cholecalciferol (VITAMIN D) 2000 UNITS Oral Cap, Take 2,000 Units by mouth daily. , Disp: , Rfl:   •  Cyanocobalamin (VITAMIN B 12 OR), Take 1 tablet by mouth daily.   , Disp: , Rfl:   •  Teriflunomide 14 MG Oral Comment: 2 to 3 beers a week    Drug use: No     Reviewed:  Family History   Problem Relation Age of Onset   • Diabetes Maternal Grandfather    • Cancer Father         melanoma  18   • Other (melanoma) Father         and pts uncle   • Other (multiple concerns.  -Follow-up with Dr. Guero Maxwell.  -Follow-up with our clinic as needed.       Follow Up:  PRN       Electronically Signed by: Alea Yuen PA-C

## 2019-03-21 ENCOUNTER — OFFICE VISIT (OUTPATIENT)
Dept: HEMATOLOGY/ONCOLOGY | Age: 59
End: 2019-03-21
Attending: INTERNAL MEDICINE
Payer: COMMERCIAL

## 2019-03-21 VITALS
BODY MASS INDEX: 27 KG/M2 | DIASTOLIC BLOOD PRESSURE: 79 MMHG | RESPIRATION RATE: 20 BRPM | OXYGEN SATURATION: 100 % | TEMPERATURE: 97 F | HEART RATE: 83 BPM | SYSTOLIC BLOOD PRESSURE: 133 MMHG | WEIGHT: 199.5 LBS

## 2019-03-21 DIAGNOSIS — D3A.021 CARCINOID TUMOR OF CECUM: Primary | ICD-10-CM

## 2019-03-21 PROCEDURE — 99211 OFF/OP EST MAY X REQ PHY/QHP: CPT

## 2019-03-21 NOTE — PROGRESS NOTES
Cancer Center Progress Note  Patient Name: Gideon Estrada   YOB: 1960   Medical Record Number: [id number]     Attending Physician: Alma Rahman M.D. Date of Visit: 3/21/2019      Chief Complaint:  Patient presents with:   Follow - cancer surgury   • COLONOSCOPY  01/31/2012   • COLONOSCOPY  1/2015    since colon cancer have 1 every 3 years   • EXPLORATORY LAPAROTOMY N/A 3/8/2019    Performed by Neena Mcgowan MD at [address] [place] [address] - RIGHT N/A 2/5/2019    P phone: Not on file        Gets together: Not on file        Attends Hoah[employer] service: Not on file        Active member of club or organization: Not on file        Attends meetings of clubs or organizations: Not on file        Relationship status: Not on f 11  •  Cholecalciferol (VITAMIN D) 2000 UNITS Oral Cap, Take 2,000 Units by mouth daily. , Disp: , Rfl:   •  Cyanocobalamin (VITAMIN B 12 OR), Take 1 tablet by mouth daily. , Disp: , Rfl:   •  Teriflunomide 14 MG Oral Tab, Take 14 mg by mouth daily.   , D rhythm, no murmurs. Abdomen Normal - Non-tender, non-distended, no masses, ascites or hepatosplenomegaly. Extremities Normal - No cyanosis, clubbing or edema.     Integumentary Normal - No rashes and noJaundice   Neurologic Normal - No sensory or motor

## 2019-03-22 ENCOUNTER — TELEPHONE (OUTPATIENT)
Dept: MEDSURG UNIT | Facility: HOSPITAL | Age: 59
End: 2019-03-22

## 2019-03-22 NOTE — TELEPHONE ENCOUNTER
Called to check on patient. Following up on wound status. [facility] with call back number.

## 2019-04-29 ENCOUNTER — APPOINTMENT (OUTPATIENT)
Dept: CT IMAGING | Facility: HOSPITAL | Age: 59
End: 2019-04-29
Attending: EMERGENCY MEDICINE
Payer: COMMERCIAL

## 2019-04-29 ENCOUNTER — TELEPHONE (OUTPATIENT)
Dept: INTERNAL MEDICINE CLINIC | Facility: CLINIC | Age: 59
End: 2019-04-29

## 2019-04-29 ENCOUNTER — HOSPITAL ENCOUNTER (EMERGENCY)
Facility: HOSPITAL | Age: 59
Discharge: HOME | End: 2019-04-29
Attending: EMERGENCY MEDICINE
Payer: COMMERCIAL

## 2019-04-29 VITALS
TEMPERATURE: 99 F | BODY MASS INDEX: 28.44 KG/M2 | HEIGHT: 72 IN | OXYGEN SATURATION: 99 % | SYSTOLIC BLOOD PRESSURE: 152 MMHG | RESPIRATION RATE: 18 BRPM | DIASTOLIC BLOOD PRESSURE: 82 MMHG | WEIGHT: 210 LBS | HEART RATE: 76 BPM

## 2019-04-29 DIAGNOSIS — M70.71 BURSITIS OF OTHER BURSA OF RIGHT HIP: Primary | ICD-10-CM

## 2019-04-29 PROCEDURE — 99284 EMERGENCY DEPT VISIT MOD MDM: CPT

## 2019-04-29 PROCEDURE — 36415 COLL VENOUS BLD VENIPUNCTURE: CPT

## 2019-04-29 PROCEDURE — 99285 EMERGENCY DEPT VISIT HI MDM: CPT

## 2019-04-29 PROCEDURE — 81001 URINALYSIS AUTO W/SCOPE: CPT | Performed by: EMERGENCY MEDICINE

## 2019-04-29 PROCEDURE — 74177 CT ABD & PELVIS W/CONTRAST: CPT | Performed by: EMERGENCY MEDICINE

## 2019-04-29 PROCEDURE — 85025 COMPLETE CBC W/AUTO DIFF WBC: CPT | Performed by: EMERGENCY MEDICINE

## 2019-04-29 PROCEDURE — 80053 COMPREHEN METABOLIC PANEL: CPT | Performed by: EMERGENCY MEDICINE

## 2019-04-29 RX ORDER — IBUPROFEN 600 MG/1
600 TABLET ORAL EVERY 8 HOURS PRN
Qty: 30 TABLET | Refills: 0 | Status: SHIPPED | OUTPATIENT
Start: 2019-04-29 | End: 2019-05-06

## 2019-04-29 NOTE — TELEPHONE ENCOUNTER
Took call from pt, he is having pain in right groin near hip. He states that pain started Friday at a 2-3. Pt went up to a 10/11 which caused him to call into the office.   He is able to walk , stairs are a challenge, no swelling, bruising, redness to the

## 2019-04-30 NOTE — ED PROVIDER NOTES
Patient Seen in: [facility] Emergency Department    History   Patient presents with:  Eval-G (genital)    Stated Complaint: Right groin pain     HPI    48-year-old male comes to the hospital chief complaint having difficulty with pain of his right g MD at [place] [facility] MAIN OR   • LAPAROSCOPY, SURGICAL; COLECTOMY, PARTIAL, W/ ANASTOMOS  2/2011   • OTHER SURGICAL HISTORY  02/05/2019    Laparoscopic-assisted small bowel resection with anastomosis   • OTHER SURGICAL HISTORY  03/08/2019    Repair of incarcerated inc Notable for the following components:    BUN 23 (*)     Calculated Osmolality 296 (*)     AST 40 (*)     All other components within normal limits   CBC W/ DIFFERENTIAL - Abnormal; Notable for the following components:    HGB 12.2 (*)     HCT 37.8 (*) subcentimeter hypodense foci within the kidneys, which are too small to characterize, but statistically represent simple cysts. AORTA/VASCULAR:  Scattered atherosclerosis. RETROPERITONEUM:  Unremarkable.  BOWEL/MESENTERY:  There is a trace amount of mesente Dictated by: Vincenzo Sadler MD on 4/29/2019 at 21:32     Approved by: Vincenzo Sadler MD            Medications   iohexol (OMNIPAQUE) 350 MG/ML injection 100 mL (100 mL Intravenous Given 4/29/19 2057)           MDM   Patient be discharged home outpati

## 2019-05-06 ENCOUNTER — OFFICE VISIT (OUTPATIENT)
Dept: PHYSICAL THERAPY | Age: 59
End: 2019-05-06
Attending: ORTHOPAEDIC SURGERY
Payer: COMMERCIAL

## 2019-05-06 DIAGNOSIS — M16.11 PRIMARY OSTEOARTHRITIS OF RIGHT HIP: ICD-10-CM

## 2019-05-06 DIAGNOSIS — M25.551 RIGHT HIP PAIN: ICD-10-CM

## 2019-05-06 PROCEDURE — 97110 THERAPEUTIC EXERCISES: CPT

## 2019-05-06 PROCEDURE — 97161 PT EVAL LOW COMPLEX 20 MIN: CPT

## 2019-05-06 NOTE — PROGRESS NOTES
LOWER EXTREMITY EVALUATION:   Referring Physician: Dr. Mata Minor  Diagnosis: right groin pain, right hip OA     Date of Service: 5/6/2019     PATIENT SUMMARY   Gideon Estrada is a 60year old y/o male who presents to therapy today with complaints of right juan j IR/ER, but has improved since the onset of pain   Idalia Ramírez would benefit from skilled Physical Therapy to address the above impairments to improve hip ROM, decrease pain, improve function    Precautions:  Recent h/o colon cancer   OBJECTIVE:   Observation: improvement in function    Frequency / Duration: Patient will be seen for 1-2 x/week or a total of 10 visits over a 90 day period. Treatment will include: Manual Therapy; Therapeutic Exercises; Neuromuscular Re-education;  Therapeutic Activity; Gait Trainin

## 2019-05-09 RX ORDER — PRAVASTATIN SODIUM 80 MG/1
TABLET ORAL
Qty: 90 TABLET | Refills: 0 | Status: SHIPPED | OUTPATIENT
Start: 2019-05-09 | End: 2019-08-07

## 2019-05-09 RX ORDER — LOSARTAN POTASSIUM 50 MG/1
TABLET ORAL
Qty: 90 TABLET | Refills: 0 | Status: SHIPPED | OUTPATIENT
Start: 2019-05-09 | End: 2019-08-07

## 2019-05-09 RX ORDER — FENOFIBRATE 145 MG/1
TABLET, COATED ORAL
Qty: 90 TABLET | Refills: 0 | Status: SHIPPED | OUTPATIENT
Start: 2019-05-09 | End: 2019-08-07

## 2019-05-13 ENCOUNTER — OFFICE VISIT (OUTPATIENT)
Dept: PHYSICAL THERAPY | Age: 59
End: 2019-05-13
Attending: ORTHOPAEDIC SURGERY
Payer: COMMERCIAL

## 2019-05-13 PROCEDURE — 97140 MANUAL THERAPY 1/> REGIONS: CPT

## 2019-05-13 PROCEDURE — 97110 THERAPEUTIC EXERCISES: CPT

## 2019-05-13 NOTE — PROGRESS NOTES
Dx: right groin pain, right hip OA             Authorized # of Visits:  10 recommended         Next MD visit: none scheduled  Fall Risk: standard         Precautions: n/a             Subjective: was feeling really good for a few days, then because was feel

## 2019-05-15 ENCOUNTER — OFFICE VISIT (OUTPATIENT)
Dept: PHYSICAL THERAPY | Age: 59
End: 2019-05-15
Attending: ORTHOPAEDIC SURGERY
Payer: COMMERCIAL

## 2019-05-15 PROCEDURE — 97110 THERAPEUTIC EXERCISES: CPT

## 2019-05-15 PROCEDURE — 97140 MANUAL THERAPY 1/> REGIONS: CPT

## 2019-05-15 NOTE — PROGRESS NOTES
Dx: right groin pain, right hip OA             Authorized # of Visits:  10 recommended         Next MD visit: none scheduled  Fall Risk: standard         Precautions: n/a             Subjective: have had a few moments of pain, but overall better.  Soreness

## 2019-05-30 ENCOUNTER — APPOINTMENT (OUTPATIENT)
Dept: PHYSICAL THERAPY | Age: 59
End: 2019-05-30
Attending: ORTHOPAEDIC SURGERY
Payer: COMMERCIAL

## 2019-05-31 ENCOUNTER — APPOINTMENT (OUTPATIENT)
Dept: PHYSICAL THERAPY | Age: 59
End: 2019-05-31
Attending: ORTHOPAEDIC SURGERY
Payer: COMMERCIAL

## 2019-06-03 ENCOUNTER — APPOINTMENT (OUTPATIENT)
Dept: PHYSICAL THERAPY | Age: 59
End: 2019-06-03
Attending: ORTHOPAEDIC SURGERY
Payer: COMMERCIAL

## 2019-06-05 ENCOUNTER — APPOINTMENT (OUTPATIENT)
Dept: PHYSICAL THERAPY | Age: 59
End: 2019-06-05
Attending: ORTHOPAEDIC SURGERY
Payer: COMMERCIAL

## 2019-06-12 ENCOUNTER — OFFICE VISIT (OUTPATIENT)
Dept: PHYSICAL THERAPY | Age: 59
End: 2019-06-12
Attending: ORTHOPAEDIC SURGERY
Payer: COMMERCIAL

## 2019-06-12 PROCEDURE — 97140 MANUAL THERAPY 1/> REGIONS: CPT

## 2019-06-12 PROCEDURE — 97110 THERAPEUTIC EXERCISES: CPT

## 2019-06-12 NOTE — PROGRESS NOTES
Dx: right groin pain, right hip OA             Authorized # of Visits:  10 recommended         Next MD visit: none scheduled  Fall Risk: standard         Precautions: n/a             Subjective: today soreness in groin is 1-2/10 on average.  Had one day of skilled selection of there ex and manual therapy addressing symptoms, one on one services provided    Charges: there ex 1, mt 2       Total Timed Treatment: 38 min  Total Treatment Time: 48 min

## 2019-06-19 ENCOUNTER — OFFICE VISIT (OUTPATIENT)
Dept: PHYSICAL THERAPY | Age: 59
End: 2019-06-19
Attending: ORTHOPAEDIC SURGERY
Payer: COMMERCIAL

## 2019-06-19 PROCEDURE — 97110 THERAPEUTIC EXERCISES: CPT

## 2019-06-19 PROCEDURE — 97140 MANUAL THERAPY 1/> REGIONS: CPT

## 2019-06-19 NOTE — PROGRESS NOTES
Dx: right groin pain, right hip OA             Authorized # of Visits:  10 recommended         Next MD visit: none scheduled  Fall Risk: standard         Precautions: n/a             Subjective: pain has been better, manageable at about 1/10.  No bad days o selection of there ex and manual therapy addressing symptoms, one on one services provided    Charges: there ex 2, mt 1      Total Timed Treatment: 38 min  Total Treatment Time: 38 min

## 2019-06-24 ENCOUNTER — OFFICE VISIT (OUTPATIENT)
Dept: PHYSICAL THERAPY | Age: 59
End: 2019-06-24
Attending: ORTHOPAEDIC SURGERY
Payer: COMMERCIAL

## 2019-06-24 PROCEDURE — 97140 MANUAL THERAPY 1/> REGIONS: CPT

## 2019-06-24 PROCEDURE — 97110 THERAPEUTIC EXERCISES: CPT

## 2019-06-24 NOTE — PROGRESS NOTES
Dx: right groin pain, right hip OA             Authorized # of Visits:  10 recommended         Next MD visit: none scheduled  Fall Risk: standard         Precautions: n/a             Subjective: hip was sore after sleeping in the chair with my wife (slept CP 15 home  Pt ed and HEP update 5 min      CP 10 min  CP 10 min  CP 10 min R hip in sidelying  --- ----       Skilled Services: skilled selection of there ex and manual therapy addressing symptoms, one on one services provided    Charges: there ex 2, mt

## 2019-06-26 ENCOUNTER — OFFICE VISIT (OUTPATIENT)
Dept: PHYSICAL THERAPY | Age: 59
End: 2019-06-26
Attending: ORTHOPAEDIC SURGERY
Payer: COMMERCIAL

## 2019-06-26 PROCEDURE — 97110 THERAPEUTIC EXERCISES: CPT

## 2019-06-26 PROCEDURE — 97140 MANUAL THERAPY 1/> REGIONS: CPT

## 2019-06-26 NOTE — PROGRESS NOTES
Dx: right groin pain, right hip OA             Authorized # of Visits:  10 recommended         Next MD visit: none scheduled  Fall Risk: standard         Precautions: n/a           Progress Note:   Kamila Young has been seen in PT for a total of 7 visits, 0 cancels YTB hip abd 2x10 B  Lateral hip dist w/ belt gr 3 3x10 bouts  Lat hip dist gr 3 3x10   2 bouts 3x10 2 bouts  ellipticle 5 min     Supine hip flexor stretch on R, 3x30 sec YTB hip ext 2x10 B  Inf hip dist w/ belt, gr 3 3x10 bouts  Long axis dist gr 3 3x10

## 2019-07-10 ENCOUNTER — NURSE ONLY (OUTPATIENT)
Dept: HEMATOLOGY/ONCOLOGY | Age: 59
End: 2019-07-10
Attending: INTERNAL MEDICINE
Payer: COMMERCIAL

## 2019-07-10 DIAGNOSIS — D3A.021 CARCINOID TUMOR OF CECUM: ICD-10-CM

## 2019-07-10 LAB
ALBUMIN SERPL-MCNC: 3.6 G/DL (ref 3.4–5)
ALBUMIN/GLOB SERPL: 1.1 {RATIO} (ref 1–2)
ALP LIVER SERPL-CCNC: 45 U/L (ref 45–117)
ALT SERPL-CCNC: 31 U/L (ref 16–61)
ANION GAP SERPL CALC-SCNC: 6 MMOL/L (ref 0–18)
AST SERPL-CCNC: 40 U/L (ref 15–37)
BASOPHILS # BLD AUTO: 0.05 X10(3) UL (ref 0–0.2)
BASOPHILS NFR BLD AUTO: 1.5 %
BILIRUB SERPL-MCNC: 0.6 MG/DL (ref 0.1–2)
BUN BLD-MCNC: 16 MG/DL (ref 7–18)
BUN/CREAT SERPL: 13.9 (ref 10–20)
CALCIUM BLD-MCNC: 9.2 MG/DL (ref 8.5–10.1)
CHLORIDE SERPL-SCNC: 106 MMOL/L (ref 98–112)
CO2 SERPL-SCNC: 28 MMOL/L (ref 21–32)
CREAT BLD-MCNC: 1.15 MG/DL (ref 0.7–1.3)
DEPRECATED RDW RBC AUTO: 51.5 FL (ref 35.1–46.3)
EOSINOPHIL # BLD AUTO: 0.3 X10(3) UL (ref 0–0.7)
EOSINOPHIL NFR BLD AUTO: 8.9 %
ERYTHROCYTE [DISTWIDTH] IN BLOOD BY AUTOMATED COUNT: 16 % (ref 11–15)
GLOBULIN PLAS-MCNC: 3.2 G/DL (ref 2.8–4.4)
GLUCOSE BLD-MCNC: 104 MG/DL (ref 70–99)
HCT VFR BLD AUTO: 33.5 % (ref 39–53)
HGB BLD-MCNC: 10.8 G/DL (ref 13–17.5)
IMM GRANULOCYTES # BLD AUTO: 0.01 X10(3) UL (ref 0–1)
IMM GRANULOCYTES NFR BLD: 0.3 %
LYMPHOCYTES # BLD AUTO: 1.1 X10(3) UL (ref 1–4)
LYMPHOCYTES NFR BLD AUTO: 32.6 %
M PROTEIN MFR SERPL ELPH: 6.8 G/DL (ref 6.4–8.2)
MCH RBC QN AUTO: 28.3 PG (ref 26–34)
MCHC RBC AUTO-ENTMCNC: 32.2 G/DL (ref 31–37)
MCV RBC AUTO: 87.9 FL (ref 80–100)
MONOCYTES # BLD AUTO: 0.47 X10(3) UL (ref 0.1–1)
MONOCYTES NFR BLD AUTO: 13.9 %
NEUTROPHILS # BLD AUTO: 1.44 X10 (3) UL (ref 1.5–7.7)
NEUTROPHILS # BLD AUTO: 1.44 X10(3) UL (ref 1.5–7.7)
NEUTROPHILS NFR BLD AUTO: 42.8 %
OSMOLALITY SERPL CALC.SUM OF ELEC: 291 MOSM/KG (ref 275–295)
PLATELET # BLD AUTO: 284 10(3)UL (ref 150–450)
POTASSIUM SERPL-SCNC: 3.7 MMOL/L (ref 3.5–5.1)
RBC # BLD AUTO: 3.81 X10(6)UL (ref 4.3–5.7)
SODIUM SERPL-SCNC: 140 MMOL/L (ref 136–145)
WBC # BLD AUTO: 3.4 X10(3) UL (ref 4–11)

## 2019-07-10 PROCEDURE — 85025 COMPLETE CBC W/AUTO DIFF WBC: CPT

## 2019-07-10 PROCEDURE — 80053 COMPREHEN METABOLIC PANEL: CPT

## 2019-07-10 PROCEDURE — 36415 COLL VENOUS BLD VENIPUNCTURE: CPT

## 2019-07-10 PROCEDURE — 86316 IMMUNOASSAY TUMOR OTHER: CPT

## 2019-07-11 ENCOUNTER — LAB ENCOUNTER (OUTPATIENT)
Dept: LAB | Age: 59
End: 2019-07-11
Attending: INTERNAL MEDICINE
Payer: COMMERCIAL

## 2019-07-11 DIAGNOSIS — D3A.00 CARCINOID TUMOR: Primary | ICD-10-CM

## 2019-07-11 DIAGNOSIS — D64.9 ANEMIA, UNSPECIFIED TYPE: ICD-10-CM

## 2019-07-11 DIAGNOSIS — D3A.00 CARCINOID TUMOR: ICD-10-CM

## 2019-07-11 PROCEDURE — 83497 ASSAY OF 5-HIAA: CPT

## 2019-07-12 ENCOUNTER — HOSPITAL ENCOUNTER (OUTPATIENT)
Dept: GENERAL RADIOLOGY | Facility: HOSPITAL | Age: 59
Discharge: HOME | End: 2019-07-12
Attending: ORTHOPAEDIC SURGERY
Payer: COMMERCIAL

## 2019-07-12 DIAGNOSIS — M16.11 PRIMARY OSTEOARTHRITIS OF RIGHT HIP: ICD-10-CM

## 2019-07-12 LAB — CHROMOGRANIN A: 332 NG/ML

## 2019-07-12 PROCEDURE — 20610 DRAIN/INJ JOINT/BURSA W/O US: CPT | Performed by: ORTHOPAEDIC SURGERY

## 2019-07-12 PROCEDURE — 77002 NEEDLE LOCALIZATION BY XRAY: CPT | Performed by: ORTHOPAEDIC SURGERY

## 2019-07-12 RX ORDER — METHYLPREDNISOLONE ACETATE 80 MG/ML
INJECTION, SUSPENSION INTRA-ARTICULAR; INTRALESIONAL; INTRAMUSCULAR; SOFT TISSUE
Status: COMPLETED
Start: 2019-07-12 | End: 2019-07-12

## 2019-07-14 LAB
5-HIAA URINE - PER 24H: 7 MG/D
5-HIAA URINE - PER VOLUME: 2.8 MG/L
5-HIAA URINE - RATIO TO CRT: 5 MG/GCR
CREATININE, URINE - PER 24H: 1410 MG/D
CREATININE, URINE - PER VOLUME: 60 MG/DL
HOURS COLLECTED: 24 HR
TOTAL VOLUME: 2350 ML

## 2019-07-15 ENCOUNTER — OFFICE VISIT (OUTPATIENT)
Dept: PHYSICAL THERAPY | Age: 59
End: 2019-07-15
Attending: ORTHOPAEDIC SURGERY
Payer: COMMERCIAL

## 2019-07-15 PROCEDURE — 97140 MANUAL THERAPY 1/> REGIONS: CPT

## 2019-07-15 PROCEDURE — 97110 THERAPEUTIC EXERCISES: CPT

## 2019-07-15 NOTE — PROGRESS NOTES
Dx: right groin pain, right hip OA             Authorized # of Visits:  10 recommended         Next MD visit: none scheduled  Fall Risk: standard         Precautions: n/a           discharge Note:   Ashley Iniguez has been seen in PT for a total of 8 visits, 0 cancel Dayana Cerda, PT     Physician's certification required:  No  Please co-sign or sign and return this letter via fax as soon as possible to [phone].    I certify the need for these services furnished under this plan of treatment and while under my ca resisted gait in bars, YTB 4 laps  Bridge with adduction 2x10  RTB lat resisted gait 4 laps  4 laps  Prone alt hip ext 2x10  2x10  Reviewed verbally    Standing right hip ext, 2x10  Bridge with abd 2x10 GTB  RTB hip ext 2x10  CP 15 home  Pt ed and HEP upda

## 2019-08-07 RX ORDER — FENOFIBRATE 145 MG/1
TABLET, COATED ORAL
Qty: 90 TABLET | Refills: 0 | Status: SHIPPED | OUTPATIENT
Start: 2019-08-07 | End: 2019-11-08

## 2019-08-07 RX ORDER — LOSARTAN POTASSIUM 50 MG/1
TABLET ORAL
Qty: 90 TABLET | Refills: 0 | Status: SHIPPED | OUTPATIENT
Start: 2019-08-07 | End: 2019-11-08

## 2019-08-07 RX ORDER — PRAVASTATIN SODIUM 80 MG/1
TABLET ORAL
Qty: 90 TABLET | Refills: 0 | Status: SHIPPED | OUTPATIENT
Start: 2019-08-07 | End: 2019-11-08

## 2019-10-10 ENCOUNTER — TELEPHONE (OUTPATIENT)
Dept: INTERNAL MEDICINE CLINIC | Facility: CLINIC | Age: 59
End: 2019-10-10

## 2019-10-11 NOTE — TELEPHONE ENCOUNTER
Fax received for sleep apnea supplies. These will need to be signed by pulmonologist. Fax returned to [facility] with note. PSR - please call patient to schedule appointment to establish with Dr. Pooja Braswell. Thank you!

## 2019-10-14 NOTE — TELEPHONE ENCOUNTER
PSR - Please call for PE. Dr. Malik Mehta to take care of CPAP supplied during office visit. Thank you!

## 2019-10-14 NOTE — TELEPHONE ENCOUNTER
MICHEL called patient to schedule appointment with Dr. Swetha Copeland. Pt stated he will call back to schedule apt. Pt also mentioned his sleep apnea supplies were signed by  in the past and said he does not have a Pulmonologist? Please advise. Thank you!

## 2019-10-14 NOTE — TELEPHONE ENCOUNTER
Received fax from [employer] for CPAP supplies. Pt states will call back to schedule OV with Dr. Keegan Mahmood for [address] [place] [address]. Wait until OV? Or okay to sign off on CPAP supply orders? Pulmonology referral?  Please advise, thanks!

## 2019-10-15 ENCOUNTER — TELEPHONE (OUTPATIENT)
Dept: INTERNAL MEDICINE CLINIC | Facility: CLINIC | Age: 59
End: 2019-10-15

## 2019-10-21 ENCOUNTER — TELEPHONE (OUTPATIENT)
Dept: INTERNAL MEDICINE CLINIC | Facility: CLINIC | Age: 59
End: 2019-10-21

## 2019-10-21 ENCOUNTER — OFFICE VISIT (OUTPATIENT)
Dept: INTERNAL MEDICINE CLINIC | Facility: CLINIC | Age: 59
End: 2019-10-21
Payer: COMMERCIAL

## 2019-10-21 VITALS
WEIGHT: 199.81 LBS | HEIGHT: 72 IN | BODY MASS INDEX: 27.06 KG/M2 | DIASTOLIC BLOOD PRESSURE: 82 MMHG | OXYGEN SATURATION: 98 % | HEART RATE: 72 BPM | TEMPERATURE: 98 F | RESPIRATION RATE: 16 BRPM | SYSTOLIC BLOOD PRESSURE: 138 MMHG

## 2019-10-21 DIAGNOSIS — E78.00 ELEVATED CHOLESTEROL: ICD-10-CM

## 2019-10-21 DIAGNOSIS — Z28.21 REFUSED INFLUENZA VACCINE: ICD-10-CM

## 2019-10-21 DIAGNOSIS — K43.2 INCISIONAL HERNIA, WITHOUT OBSTRUCTION OR GANGRENE: ICD-10-CM

## 2019-10-21 DIAGNOSIS — E53.8 B12 DEFICIENCY: ICD-10-CM

## 2019-10-21 DIAGNOSIS — I10 ESSENTIAL HYPERTENSION: ICD-10-CM

## 2019-10-21 DIAGNOSIS — D50.8 OTHER IRON DEFICIENCY ANEMIA: ICD-10-CM

## 2019-10-21 DIAGNOSIS — Z00.00 ENCOUNTER FOR ANNUAL PHYSICAL EXAM: Primary | ICD-10-CM

## 2019-10-21 DIAGNOSIS — G35 MULTIPLE SCLEROSIS (HCC): ICD-10-CM

## 2019-10-21 DIAGNOSIS — E11.9 TYPE 2 DIABETES MELLITUS WITHOUT COMPLICATION, WITHOUT LONG-TERM CURRENT USE OF INSULIN (HCC): ICD-10-CM

## 2019-10-21 DIAGNOSIS — Z99.89 OSA ON CPAP: ICD-10-CM

## 2019-10-21 DIAGNOSIS — H61.23 BILATERAL IMPACTED CERUMEN: ICD-10-CM

## 2019-10-21 DIAGNOSIS — R20.0 NUMBNESS OF RIGHT FOOT: ICD-10-CM

## 2019-10-21 DIAGNOSIS — Z11.59 ENCOUNTER FOR HCV SCREENING TEST FOR LOW RISK PATIENT: ICD-10-CM

## 2019-10-21 DIAGNOSIS — N20.0 NEPHROLITHIASIS: ICD-10-CM

## 2019-10-21 DIAGNOSIS — G47.33 OSA ON CPAP: ICD-10-CM

## 2019-10-21 PROCEDURE — 99396 PREV VISIT EST AGE 40-64: CPT | Performed by: INTERNAL MEDICINE

## 2019-10-21 RX ORDER — AMLODIPINE BESYLATE 5 MG/1
5 TABLET ORAL DAILY
Qty: 30 TABLET | Refills: 0 | Status: SHIPPED | OUTPATIENT
Start: 2019-10-21 | End: 2019-11-20

## 2019-10-21 NOTE — PROGRESS NOTES
[facility] Annual Physical Exam Note    HPI:    Patient ID: Hector Benavides is a 60year old male. Chief Complaint: Physical (Annual Physical.  Pt. has numness in his feet.  Feels in mostly on the bottom and right foot.)      14-year-old male wit oncologist Dr. Shila Beard. Dr. Shila Beard had ordered some repeat blood test for him in July to be done in a month, which he did not get done.     Health Maintenance:   Diabetes Care Dilated Eye Exam due on 05/24/1960  Pneumococcal PPSV23/PCV13 Highest Risk 03/21/2006   • TDAP 08/29/2013   • Vitamin B-12 Up To 1000mcg, IM/SC Inj 11/04/2015, 11/05/2015, 11/06/2015, 11/09/2015, 11/10/2015       Depression Screening (PHQ-2/PHQ-9): Over the LAST 2 WEEKS   Little interest or pleasure in doing things (over the last Teriflunomide 14 MG Oral Tab, Take 14 mg by mouth daily.   , Disp: , Rfl:          Patient Active Problem List    Incisional hernia         Date Noted: 03/06/2019      Acute blood loss as cause of postoperative anemia      Hyperlipidemia      Carcinoid tu Grandmother 79         Past Surgical History:   Procedure Laterality Date   • APPENDECTOMY  2/2011    Removed in Colon cancer surgury   • BOWEL RESECTION     • COLONOSCOPY  01/31/2012   • COLONOSCOPY  1/2015    since colon cancer have 1 every 3 years   • E Dorsalis pedis pulses are 2+ on the right side and 2+ on the left side. Posterior tibial pulses are 2+ on the right side and 2+ on the left side. Edema not present.   Pulmonary/Chest: Effort normal and breath sounds normal. No respiratory distress  07/10/2019    CO2 28.0 07/10/2019     Lab Results   Component Value Date    WBC 3.4 (L) 07/10/2019    RBC 3.81 (L) 07/10/2019    HGB 10.8 (L) 07/10/2019    HCT 33.5 (L) 07/10/2019    .0 07/10/2019    MPV 9.6 12/11/2012    MCV 87.9 07/10/20 HEMOGLOBIN A1C; Future  -     MICROALB/CREAT RATIO, RANDOM URINE;  Future  -     OPHTHALMOLOGY - INTERNAL    Multiple sclerosis (HCC)    B12 deficiency    Other iron deficiency anemia    Encounter for HCV screening test for low risk patient  -     HCV ANTIB because his hemoglobin A1c in January was 6.4 and prior to that he was in the high fives. He did report that he was previously on metformin and did have a official diagnosis of diabetes but was recently able to come off medication.   At this time, patient office/imaging appointments at the  prior to leaving, and to sign up for mychart if not already active. Preventive measures and further education discussed with patient as per after visit summary. Potential medication side effects discussed.  All

## 2019-10-21 NOTE — TELEPHONE ENCOUNTER
Incoming (mail or fax):  fax  Received from:  [id number]  Documentation given to:  triage

## 2019-10-21 NOTE — PATIENT INSTRUCTIONS
Start Amlodipine for high blood pressure  Get labs done in the next few days (labs I have ordered and labs previously ordered by Dr. Marcy Titus)  Follow-up in 4wks for HTN  Schedule appointment with Ophthalmology

## 2019-10-22 NOTE — TELEPHONE ENCOUNTER
Received Confirmation of Order fax from [phone] for CPAP equipment. Last OV 10/21/19.   To Dr. Catracho Avalos for review & signature

## 2019-10-22 NOTE — TELEPHONE ENCOUNTER
Faxed back to [place] at [employer]  F: [phone]  P: [phone]  Confirmed fax sent  Sent to scan

## 2019-10-23 ENCOUNTER — LAB ENCOUNTER (OUTPATIENT)
Dept: LAB | Age: 59
End: 2019-10-23
Attending: INTERNAL MEDICINE
Payer: COMMERCIAL

## 2019-10-23 DIAGNOSIS — I10 ESSENTIAL HYPERTENSION: ICD-10-CM

## 2019-10-23 DIAGNOSIS — D3A.021 CARCINOID TUMOR OF CECUM: ICD-10-CM

## 2019-10-23 DIAGNOSIS — C7A.021 MALIGNANT CARCINOID TUMOR OF CECUM (HCC): ICD-10-CM

## 2019-10-23 DIAGNOSIS — D64.9 ANEMIA, UNSPECIFIED TYPE: ICD-10-CM

## 2019-10-23 DIAGNOSIS — E11.9 TYPE 2 DIABETES MELLITUS WITHOUT COMPLICATION, WITHOUT LONG-TERM CURRENT USE OF INSULIN (HCC): ICD-10-CM

## 2019-10-23 DIAGNOSIS — Z11.59 ENCOUNTER FOR HCV SCREENING TEST FOR LOW RISK PATIENT: ICD-10-CM

## 2019-10-23 DIAGNOSIS — E78.00 ELEVATED CHOLESTEROL: ICD-10-CM

## 2019-10-23 PROCEDURE — 36415 COLL VENOUS BLD VENIPUNCTURE: CPT | Performed by: INTERNAL MEDICINE

## 2019-10-23 PROCEDURE — 80061 LIPID PANEL: CPT | Performed by: INTERNAL MEDICINE

## 2019-10-23 PROCEDURE — 82570 ASSAY OF URINE CREATININE: CPT | Performed by: INTERNAL MEDICINE

## 2019-10-23 PROCEDURE — 83036 HEMOGLOBIN GLYCOSYLATED A1C: CPT | Performed by: INTERNAL MEDICINE

## 2019-10-23 PROCEDURE — 86803 HEPATITIS C AB TEST: CPT | Performed by: INTERNAL MEDICINE

## 2019-10-23 PROCEDURE — 82043 UR ALBUMIN QUANTITATIVE: CPT | Performed by: INTERNAL MEDICINE

## 2019-11-05 ENCOUNTER — MED REC SCAN ONLY (OUTPATIENT)
Dept: INTERNAL MEDICINE CLINIC | Facility: CLINIC | Age: 59
End: 2019-11-05

## 2019-11-08 RX ORDER — PRAVASTATIN SODIUM 80 MG/1
TABLET ORAL
Qty: 90 TABLET | Refills: 0 | Status: SHIPPED | OUTPATIENT
Start: 2019-11-08 | End: 2020-01-28

## 2019-11-08 RX ORDER — LOSARTAN POTASSIUM 50 MG/1
TABLET ORAL
Qty: 90 TABLET | Refills: 0 | Status: SHIPPED | OUTPATIENT
Start: 2019-11-08 | End: 2020-01-28

## 2019-11-08 RX ORDER — PRAVASTATIN SODIUM 80 MG/1
TABLET ORAL
Qty: 90 TABLET | Refills: 0 | OUTPATIENT
Start: 2019-11-08

## 2019-11-08 RX ORDER — LOSARTAN POTASSIUM 50 MG/1
TABLET ORAL
Qty: 90 TABLET | Refills: 0 | OUTPATIENT
Start: 2019-11-08

## 2019-11-08 RX ORDER — FENOFIBRATE 145 MG/1
145 TABLET, COATED ORAL
Qty: 90 TABLET | Refills: 0 | Status: SHIPPED | OUTPATIENT
Start: 2019-11-08 | End: 2020-01-28

## 2019-11-08 RX ORDER — FENOFIBRATE 145 MG/1
TABLET, COATED ORAL
Qty: 90 TABLET | Refills: 0 | OUTPATIENT
Start: 2019-11-08

## 2019-11-13 ENCOUNTER — OFFICE VISIT (OUTPATIENT)
Dept: HEMATOLOGY/ONCOLOGY | Age: 59
End: 2019-11-13
Attending: INTERNAL MEDICINE
Payer: COMMERCIAL

## 2019-11-13 VITALS
TEMPERATURE: 99 F | DIASTOLIC BLOOD PRESSURE: 80 MMHG | SYSTOLIC BLOOD PRESSURE: 149 MMHG | HEART RATE: 73 BPM | OXYGEN SATURATION: 100 % | RESPIRATION RATE: 16 BRPM | BODY MASS INDEX: 26.93 KG/M2 | WEIGHT: 198.81 LBS | HEIGHT: 72 IN

## 2019-11-13 DIAGNOSIS — D64.9 ANEMIA, UNSPECIFIED TYPE: ICD-10-CM

## 2019-11-13 DIAGNOSIS — D3A.021 CARCINOID TUMOR OF CECUM, UNSPECIFIED WHETHER MALIGNANT: Primary | ICD-10-CM

## 2019-11-13 PROCEDURE — 99213 OFFICE O/P EST LOW 20 MIN: CPT | Performed by: INTERNAL MEDICINE

## 2019-11-13 RX ORDER — OMEGA-3-ACID ETHYL ESTERS 1 G/1
1 CAPSULE, LIQUID FILLED ORAL DAILY
COMMUNITY

## 2019-11-14 NOTE — PROGRESS NOTES
Cancer Center Progress Note  Patient Name: Jose Rivera   YOB: 1960   Medical Record Number: [id number]     Attending Physician: Vane Le M.D. Date of Visit: 11/13/19      Chief Complaint:  Patient presents with:   Follow - • COLONOSCOPY  01/31/2012   • COLONOSCOPY  1/2015    since colon cancer have 1 every 3 years   • EXPLORATORY LAPAROTOMY N/A 3/8/2019    Performed by Shilpa Gastelum MD at [address]   • HERNIA SURGERY  05/09/2019    Abd.    • LAPAROSCOPIC COLON RESECTION - Social connections:        Talks on phone: Not on file        Gets together: Not on file        Attends [employer] service: Not on file        Active member of club or organization: Not on file        Attends meetings of clubs or organizations: Not on file Take 2,000 Units by mouth daily. , Disp: , Rfl:   •  Cyanocobalamin (VITAMIN B 12 OR), Take 1 tablet by mouth daily. , Disp: , Rfl:   •  Teriflunomide 14 MG Oral Tab, Take 14 mg by mouth daily.   , Disp: , Rfl:     Allergies:  No Known Allergies     Revie rashes, No Jaundice   Neurologic Normal - No sensory or motor deficits, normal cerebellar function, normal gait, cranial nerves intact. Psychiatric Normal - A&Ox3, Coherent speech. Verbalizes understanding of our discussions today.        Laboratory:  Res 10.5 (L)   Hematocrit Latest Ref Range: 39.0 - 53.0 % 33.4 (L)   Platelet Count Latest Ref Range: 150.0 - 450.0 10(3)uL 293.0   RBC Latest Ref Range: 4.30 - 5.70 x10(6)uL 3.69 (L)   MCH Latest Ref Range: 26.0 - 34.0 pg 28.5   MCHC Latest Ref Range: 31.0 - Up:  2 months    Electronically Signed by: lAen Monterroso M.D.   [facility]

## 2019-11-18 ENCOUNTER — OFFICE VISIT (OUTPATIENT)
Dept: INTERNAL MEDICINE CLINIC | Facility: CLINIC | Age: 59
End: 2019-11-18
Payer: COMMERCIAL

## 2019-11-18 VITALS
SYSTOLIC BLOOD PRESSURE: 138 MMHG | HEIGHT: 72 IN | HEART RATE: 82 BPM | RESPIRATION RATE: 16 BRPM | TEMPERATURE: 98 F | BODY MASS INDEX: 27.02 KG/M2 | WEIGHT: 199.5 LBS | DIASTOLIC BLOOD PRESSURE: 76 MMHG | OXYGEN SATURATION: 99 %

## 2019-11-18 DIAGNOSIS — M76.891 HAMSTRING TENDINITIS OF RIGHT THIGH: ICD-10-CM

## 2019-11-18 DIAGNOSIS — I10 ESSENTIAL HYPERTENSION: Primary | ICD-10-CM

## 2019-11-18 PROCEDURE — 99214 OFFICE O/P EST MOD 30 MIN: CPT | Performed by: INTERNAL MEDICINE

## 2019-11-18 RX ORDER — AMLODIPINE BESYLATE 10 MG/1
10 TABLET ORAL DAILY
Qty: 90 TABLET | Refills: 1 | Status: SHIPPED | OUTPATIENT
Start: 2019-11-18 | End: 2020-04-21

## 2019-11-18 NOTE — PROGRESS NOTES
Established Patient Progress Note  Chief Complaint:   Patient presents with:   Follow - Up: HTN      HPI:   This is a 60year old male with PMH HTN, HLD, DM2, [place] on CPAP, MS, Carcinoid tumor s/p resection following with Dr. Sanchez  presenting today for f • Psyllium (METAMUCIL FIBER OR) Take by mouth daily. • Vardenafil HCl (LEVITRA) 20 MG Oral Tab Take 1 tablet (20 mg total) by mouth daily as needed for Erectile Dysfunction.  10 tablet 11   • Cholecalciferol (VITAMIN D) 2000 UNITS Oral Cap Take 2,000 microglobulin recently checked, without any evidence of electrolyte or renal dysfunction. NOTE: Patient has very high 10yr ASCVD 25.4% at last visit. Patient advised to perform a heart scan and given pamphlet. This will help risk stratify.  Need to bet Denita Dumas MD

## 2019-12-02 ENCOUNTER — HOSPITAL ENCOUNTER (OUTPATIENT)
Dept: NUCLEAR MEDICINE | Facility: HOSPITAL | Age: 59
Discharge: HOME | End: 2019-12-02
Attending: INTERNAL MEDICINE
Payer: COMMERCIAL

## 2019-12-02 DIAGNOSIS — D3A.021 CARCINOID TUMOR OF CECUM, UNSPECIFIED WHETHER MALIGNANT: ICD-10-CM

## 2019-12-02 PROCEDURE — 78815 PET IMAGE W/CT SKULL-THIGH: CPT | Performed by: INTERNAL MEDICINE

## 2019-12-12 ENCOUNTER — OFFICE VISIT (OUTPATIENT)
Dept: PHYSICAL THERAPY | Age: 59
End: 2019-12-12
Attending: INTERNAL MEDICINE
Payer: COMMERCIAL

## 2019-12-12 PROCEDURE — 97161 PT EVAL LOW COMPLEX 20 MIN: CPT

## 2019-12-12 PROCEDURE — 97110 THERAPEUTIC EXERCISES: CPT

## 2019-12-15 NOTE — PROGRESS NOTES
LOWER EXTREMITY EVALUATION:   Referring Physician: Dr. Aureliano Zuniga  Diagnosis: R hip pain -OA Date of Service: 12/15/2019     PATIENT SUMMARY   Nate Villagomez is a 60year old male who presents to therapy today with complaints of R hip pain.  Pt has history of apnea)    • Pneumonia 2006   • Prediabetes    • Sleep apnea 2000   • Testicular lump     right   • Visual impairment     glasses       ASSESSMENT  Glenna Smiley presents to physical therapy evaluation with primary c/o post R hip pain .  The results of the objecti prognosis. Pt was also provided recommendations for sleeping and sitting posture.   Patient was instructed in and issued a HEP for: HS stretch and piriformis stretch     Charges: PT Elizabethal low , EX 1       Total Timed Treatment:  45 min     Total Treatment Ti need for these services furnished under this plan of treatment and while under my care.     X___________________________________________________ Date____________________    Certification From: 61/26/6004  To:3/14/2020

## 2019-12-17 ENCOUNTER — OFFICE VISIT (OUTPATIENT)
Dept: PHYSICAL THERAPY | Age: 59
End: 2019-12-17
Attending: INTERNAL MEDICINE
Payer: COMMERCIAL

## 2019-12-17 PROCEDURE — 97110 THERAPEUTIC EXERCISES: CPT

## 2019-12-19 ENCOUNTER — OFFICE VISIT (OUTPATIENT)
Dept: PHYSICAL THERAPY | Age: 59
End: 2019-12-19
Attending: INTERNAL MEDICINE
Payer: COMMERCIAL

## 2019-12-19 PROCEDURE — 97110 THERAPEUTIC EXERCISES: CPT

## 2019-12-19 NOTE — PROGRESS NOTES
Dx: R post hip pain chronic OA         Insurance (Authorized # of Visits):  8          Authorizing Physician: Dr. Ame Gil  Next MD visit: none scheduled  Fall Risk: standard         Precautions: n/a             Subjective: over the weekend I had so much mireya

## 2019-12-20 NOTE — PROGRESS NOTES
Dx: R post hip pain chronic OA         Insurance (Authorized # of Visits):  8          Authorizing Physician: Dr. Camila John  Next MD visit: none scheduled  Fall Risk: standard         Precautions: n/a             Subjective: post pain in the R thigh orlando brady HEP:  Clams in side lying red band     Charges:  EX 3        Total Timed Treatment:  45 min  Total Treatment Time:  45 min

## 2019-12-24 ENCOUNTER — OFFICE VISIT (OUTPATIENT)
Dept: PHYSICAL THERAPY | Age: 59
End: 2019-12-24
Attending: INTERNAL MEDICINE
Payer: COMMERCIAL

## 2019-12-24 PROCEDURE — 97110 THERAPEUTIC EXERCISES: CPT

## 2019-12-24 NOTE — PROGRESS NOTES
Dx: R post hip pain chronic OA         Insurance (Authorized # of Visits):  8          Authorizing Physician: Dr. Taylor Most  Next MD visit: none scheduled  Fall Risk: standard         Precautions: n/a             Subjective:  Feeling pain almost everyday, teri secs    Side lying hip abd/clams red band 10 x 2   Bridges on SB 10 x 2 knee extended     Side lying clams red band 10 x 2   Prone: R hip IR/ER passive stretches  Quad stretch 3 x 30 secs  PA glides to lumbar spine mult bouts of grade 2-3   Uni   Belt: lat

## 2019-12-26 ENCOUNTER — OFFICE VISIT (OUTPATIENT)
Dept: PHYSICAL THERAPY | Age: 59
End: 2019-12-26
Attending: INTERNAL MEDICINE
Payer: COMMERCIAL

## 2019-12-26 DIAGNOSIS — M76.891 HAMSTRING TENDINITIS OF RIGHT THIGH: ICD-10-CM

## 2019-12-26 PROCEDURE — 97110 THERAPEUTIC EXERCISES: CPT

## 2019-12-26 NOTE — PROGRESS NOTES
Dx: R post hip pain chronic OA         Insurance (Authorized # of Visits):  8          Authorizing Physician: Dr. Rosealee Apgar  Next MD visit: none scheduled  Fall Risk: standard         Precautions: n/a             Subjective: no pain post hip all day yesterday 30 secs    Side lying hip abd/clams red band 10 x 2   Bridges on SB 10 x 2 knee extended Step ups on bosu 10 x on each side with HHA  Side step ups with Right 10 x off bosu     Stephen stretch 3 x 30 secs B   Red band 10 x 2 each hip flex and ext   Long axi

## 2020-01-02 ENCOUNTER — APPOINTMENT (OUTPATIENT)
Dept: PHYSICAL THERAPY | Age: 60
End: 2020-01-02
Attending: INTERNAL MEDICINE
Payer: COMMERCIAL

## 2020-01-07 ENCOUNTER — OFFICE VISIT (OUTPATIENT)
Dept: PHYSICAL THERAPY | Age: 60
End: 2020-01-07
Attending: INTERNAL MEDICINE
Payer: COMMERCIAL

## 2020-01-07 DIAGNOSIS — M76.891 HAMSTRING TENDINITIS OF RIGHT THIGH: ICD-10-CM

## 2020-01-07 PROCEDURE — 97110 THERAPEUTIC EXERCISES: CPT

## 2020-01-07 NOTE — PROGRESS NOTES
Dx: R post hip pain chronic OA         Insurance (Authorized # of Visits):  8          Authorizing Physician: Dr. Dominic Tatum MD visit: none scheduled  Fall Risk: standard         Precautions: n/a             Subjective: for the past week, no pain in the lateral glide 3 x 30 secs  Passive R hip ER/flexon  All planes   Long axis distraction 3 x 30 secs  Passive HS stretch 3 x 30 secs    Side lying hip abd/clams red band 10 x 2   Bridges on SB 10 x 2 knee extended Step ups on bosu 10 x on each side with HHA

## 2020-01-09 ENCOUNTER — APPOINTMENT (OUTPATIENT)
Dept: PHYSICAL THERAPY | Age: 60
End: 2020-01-09
Attending: INTERNAL MEDICINE
Payer: COMMERCIAL

## 2020-01-13 ENCOUNTER — OFFICE VISIT (OUTPATIENT)
Dept: PHYSICAL THERAPY | Age: 60
End: 2020-01-13
Attending: INTERNAL MEDICINE
Payer: COMMERCIAL

## 2020-01-13 PROCEDURE — 97110 THERAPEUTIC EXERCISES: CPT

## 2020-01-13 NOTE — PROGRESS NOTES
Dx: R post hip pain chronic OA         Insurance (Authorized # of Visits):  8          Authorizing Physician: Dr. Avis Severance  Next MD visit: none scheduled  Fall Risk: standard         Precautions: n/a             Subjective:   Feeling good R hip.  One ankle we axis distraction 3 x 30 secs  Mobs with belt lateral glide 3 x 30 secs  Passive R hip ER/flexon  All planes   Long axis distraction 3 x 30 secs  Passive HS stretch 3 x 30 secs    Side lying hip abd/clams red band 10 x 2   Bridges on SB 10 x 2 knee extended

## 2020-01-22 ENCOUNTER — OFFICE VISIT (OUTPATIENT)
Dept: PHYSICAL THERAPY | Age: 60
End: 2020-01-22
Attending: INTERNAL MEDICINE
Payer: COMMERCIAL

## 2020-01-22 PROCEDURE — 97110 THERAPEUTIC EXERCISES: CPT

## 2020-01-22 NOTE — PROGRESS NOTES
Dx: R post hip pain chronic OA         Insurance (Authorized # of Visits):  8          Authorizing Physician: Dr. Rock Burgess  Next MD visit: none scheduled  Fall Risk: standard         Precautions: n/a             Subjective:   Feeling better in the post R hip 10 x   5 cords 10 squats   Step ups 6 inch step 10 x each side  PROM R hip long axis distraction 3 x 30 secs  Mobs with belt lateral glide 3 x 30 secs  Passive R hip ER/flexon  All planes   Long axis distraction 3 x 30 secs  Passive HS stretch 3 x 30 secs

## 2020-01-24 ENCOUNTER — APPOINTMENT (OUTPATIENT)
Dept: PHYSICAL THERAPY | Age: 60
End: 2020-01-24
Attending: INTERNAL MEDICINE
Payer: COMMERCIAL

## 2020-01-28 RX ORDER — LOSARTAN POTASSIUM 50 MG/1
TABLET ORAL
Qty: 90 TABLET | Refills: 0 | Status: SHIPPED | OUTPATIENT
Start: 2020-01-28 | End: 2020-04-21

## 2020-01-28 RX ORDER — PRAVASTATIN SODIUM 80 MG/1
TABLET ORAL
Qty: 90 TABLET | Refills: 0 | Status: SHIPPED | OUTPATIENT
Start: 2020-01-28 | End: 2020-04-21

## 2020-01-28 RX ORDER — FENOFIBRATE 145 MG/1
TABLET, COATED ORAL
Qty: 90 TABLET | Refills: 0 | Status: SHIPPED | OUTPATIENT
Start: 2020-01-28 | End: 2020-04-21

## 2020-01-30 ENCOUNTER — OFFICE VISIT (OUTPATIENT)
Dept: PHYSICAL THERAPY | Age: 60
End: 2020-01-30
Attending: INTERNAL MEDICINE
Payer: COMMERCIAL

## 2020-01-30 PROCEDURE — 97110 THERAPEUTIC EXERCISES: CPT

## 2020-01-30 NOTE — PROGRESS NOTES
Dx: R post hip pain chronic OA         Insurance (Authorized # of Visits):  8          Authorizing Physician: Dr. Janette Moreau  Next MD visit: none scheduled  Fall Risk: standard         Precautions: n/a             Subjective:    Pt just came from the school cr ball toss in bars with blue ball 10 x 2   Step ups bosu ball 10 x 2 with one hand for assist   nustep 5 mins  LE only     A/P  M/L 10 x one HHA    Step ups on bosu ball one hand  10 x each    nustep 5 mins    Squats TRX 10 x 2     Lateral side walking alhaji R hip IR/ER passive stretches  Quad stretch 3 x 30 secs  PA glides to lumbar spine mult bouts of grade 2-3   Uni   Belt: lateral glide with passive ER and SKTC 5 mins   Long axis distraction 3 x 30 secs R   HS stretch 3 x 30 secs  Side lying red band clams

## 2020-02-04 ENCOUNTER — OFFICE VISIT (OUTPATIENT)
Dept: PHYSICAL THERAPY | Age: 60
End: 2020-02-04
Attending: INTERNAL MEDICINE
Payer: COMMERCIAL

## 2020-02-04 PROCEDURE — 97110 THERAPEUTIC EXERCISES: CPT

## 2020-02-04 NOTE — PROGRESS NOTES
Dx: R post hip pain chronic OA         Insurance (Authorized # of Visits):  8          Authorizing Physician: Dr. Jermain Marte  Next MD visit: none scheduled  Fall Risk: standard         Precautions: n/a            Discharge Summary  Pt has attended 10 visits in nustep 5 mins  LE only     A/P  M/L 10 x one HHA    Step ups on bosu ball one hand  10 x each    nustep 5 mins    Squats TRX 10 x 2     Lateral side walking green band   Monster walk 10 x2 green band   Bridges 10 x 2  Ski pole flex and ext 10 x 2 green ban in bars 2 x   PROM R hip all planes  Mobs with belt with active SKTC 10 x lateral glide  10 x   Long axis distraction 3 x 30 secs L   Progressed to blue band for clams 10 x 2   Donkey kicks 10 x on each side         Side lying clams red band 10 x 2   Prone

## 2020-02-12 ENCOUNTER — LAB ENCOUNTER (OUTPATIENT)
Dept: LAB | Age: 60
End: 2020-02-12
Attending: NURSE PRACTITIONER
Payer: COMMERCIAL

## 2020-02-12 DIAGNOSIS — D62 ACUTE BLOOD LOSS AS CAUSE OF POSTOPERATIVE ANEMIA: ICD-10-CM

## 2020-02-12 DIAGNOSIS — E11.9 TYPE 2 DIABETES MELLITUS WITHOUT COMPLICATION, WITHOUT LONG-TERM CURRENT USE OF INSULIN (HCC): ICD-10-CM

## 2020-02-12 DIAGNOSIS — D3A.021 CARCINOID TUMOR OF CECUM, UNSPECIFIED WHETHER MALIGNANT: ICD-10-CM

## 2020-02-12 LAB
ALBUMIN SERPL-MCNC: 3.6 G/DL (ref 3.4–5)
ALBUMIN/GLOB SERPL: 1 {RATIO} (ref 1–2)
ALP LIVER SERPL-CCNC: 46 U/L (ref 45–117)
ALT SERPL-CCNC: 36 U/L (ref 16–61)
ANION GAP SERPL CALC-SCNC: 6 MMOL/L (ref 0–18)
AST SERPL-CCNC: 50 U/L (ref 15–37)
BASOPHILS # BLD AUTO: 0.05 X10(3) UL (ref 0–0.2)
BASOPHILS NFR BLD AUTO: 1.6 %
BILIRUB SERPL-MCNC: 0.8 MG/DL (ref 0.1–2)
BUN BLD-MCNC: 12 MG/DL (ref 7–18)
BUN/CREAT SERPL: 10.5 (ref 10–20)
CALCIUM BLD-MCNC: 9.2 MG/DL (ref 8.5–10.1)
CHLORIDE SERPL-SCNC: 108 MMOL/L (ref 98–112)
CHOLEST SMN-MCNC: 139 MG/DL (ref ?–200)
CO2 SERPL-SCNC: 27 MMOL/L (ref 21–32)
CREAT BLD-MCNC: 1.14 MG/DL (ref 0.7–1.3)
DEPRECATED RDW RBC AUTO: 54.1 FL (ref 35.1–46.3)
EOSINOPHIL # BLD AUTO: 0.22 X10(3) UL (ref 0–0.7)
EOSINOPHIL NFR BLD AUTO: 6.9 %
ERYTHROCYTE [DISTWIDTH] IN BLOOD BY AUTOMATED COUNT: 16.3 % (ref 11–15)
EST. AVERAGE GLUCOSE BLD GHB EST-MCNC: 117 MG/DL (ref 68–126)
GLOBULIN PLAS-MCNC: 3.6 G/DL (ref 2.8–4.4)
GLUCOSE BLD-MCNC: 113 MG/DL (ref 70–99)
HBA1C MFR BLD HPLC: 5.7 % (ref ?–5.7)
HCT VFR BLD AUTO: 34.3 % (ref 39–53)
HDLC SERPL-MCNC: 14 MG/DL (ref 40–59)
HGB BLD-MCNC: 10.8 G/DL (ref 13–17.5)
IMM GRANULOCYTES # BLD AUTO: 0.02 X10(3) UL (ref 0–1)
IMM GRANULOCYTES NFR BLD: 0.6 %
LDLC SERPL CALC-MCNC: 83 MG/DL (ref ?–100)
LYMPHOCYTES # BLD AUTO: 1.25 X10(3) UL (ref 1–4)
LYMPHOCYTES NFR BLD AUTO: 39.4 %
M PROTEIN MFR SERPL ELPH: 7.2 G/DL (ref 6.4–8.2)
MCH RBC QN AUTO: 28.4 PG (ref 26–34)
MCHC RBC AUTO-ENTMCNC: 31.5 G/DL (ref 31–37)
MCV RBC AUTO: 90.3 FL (ref 80–100)
MONOCYTES # BLD AUTO: 0.66 X10(3) UL (ref 0.1–1)
MONOCYTES NFR BLD AUTO: 20.8 %
NEUTROPHILS # BLD AUTO: 0.97 X10 (3) UL (ref 1.5–7.7)
NEUTROPHILS # BLD AUTO: 0.97 X10(3) UL (ref 1.5–7.7)
NEUTROPHILS NFR BLD AUTO: 30.7 %
NONHDLC SERPL-MCNC: 125 MG/DL (ref ?–130)
OSMOLALITY SERPL CALC.SUM OF ELEC: 293 MOSM/KG (ref 275–295)
PATIENT FASTING Y/N/NP: YES
PATIENT FASTING Y/N/NP: YES
PLATELET # BLD AUTO: 297 10(3)UL (ref 150–450)
POTASSIUM SERPL-SCNC: 3.9 MMOL/L (ref 3.5–5.1)
RBC # BLD AUTO: 3.8 X10(6)UL (ref 4.3–5.7)
SODIUM SERPL-SCNC: 141 MMOL/L (ref 136–145)
TRIGL SERPL-MCNC: 210 MG/DL (ref 30–149)
VLDLC SERPL CALC-MCNC: 42 MG/DL (ref 0–30)
WBC # BLD AUTO: 3.2 X10(3) UL (ref 4–11)

## 2020-02-12 PROCEDURE — 36415 COLL VENOUS BLD VENIPUNCTURE: CPT | Performed by: NURSE PRACTITIONER

## 2020-02-12 PROCEDURE — 80053 COMPREHEN METABOLIC PANEL: CPT | Performed by: NURSE PRACTITIONER

## 2020-02-12 PROCEDURE — 83036 HEMOGLOBIN GLYCOSYLATED A1C: CPT | Performed by: NURSE PRACTITIONER

## 2020-02-12 PROCEDURE — 85025 COMPLETE CBC W/AUTO DIFF WBC: CPT | Performed by: NURSE PRACTITIONER

## 2020-02-12 PROCEDURE — 80061 LIPID PANEL: CPT | Performed by: NURSE PRACTITIONER

## 2020-02-12 NOTE — PROGRESS NOTES
Received results from Radha Horn NP. Results, though abnormal, consistent with patient's previous abnormal labs since July 2019. Routed to Dr. Sara Marks for further.

## 2020-02-15 LAB — CHROMOGRANIN A: 280 NG/ML

## 2020-02-18 ENCOUNTER — OFFICE VISIT (OUTPATIENT)
Dept: INTERNAL MEDICINE CLINIC | Facility: CLINIC | Age: 60
End: 2020-02-18
Payer: COMMERCIAL

## 2020-02-18 VITALS
DIASTOLIC BLOOD PRESSURE: 70 MMHG | HEIGHT: 72 IN | OXYGEN SATURATION: 96 % | RESPIRATION RATE: 14 BRPM | HEART RATE: 83 BPM | SYSTOLIC BLOOD PRESSURE: 126 MMHG | BODY MASS INDEX: 26.82 KG/M2 | TEMPERATURE: 98 F | WEIGHT: 198 LBS

## 2020-02-18 DIAGNOSIS — E78.5 DYSLIPIDEMIA: ICD-10-CM

## 2020-02-18 DIAGNOSIS — R10.30 LOWER ABDOMINAL PAIN: ICD-10-CM

## 2020-02-18 DIAGNOSIS — E11.9 TYPE 2 DIABETES MELLITUS WITHOUT COMPLICATION, WITHOUT LONG-TERM CURRENT USE OF INSULIN (HCC): ICD-10-CM

## 2020-02-18 DIAGNOSIS — D70.9 NEUTROPENIA, UNSPECIFIED TYPE (HCC): ICD-10-CM

## 2020-02-18 DIAGNOSIS — I10 ESSENTIAL HYPERTENSION: Primary | ICD-10-CM

## 2020-02-18 DIAGNOSIS — D64.9 ANEMIA, UNSPECIFIED TYPE: ICD-10-CM

## 2020-02-18 PROCEDURE — 99214 OFFICE O/P EST MOD 30 MIN: CPT | Performed by: INTERNAL MEDICINE

## 2020-02-18 NOTE — PATIENT INSTRUCTIONS
Do heart scan to help risk-stratify and assess need for more lipid lowering medications and aspirin use    Colonoscopy, need to schedule. Dr Anastasia Workman.     Schedule appointment with Dr. Ciara Hunter to discuss need for bone marrow biopsy given finding of anemia a

## 2020-02-18 NOTE — PROGRESS NOTES
Established Patient Progress Note  Chief Complaint:   Patient presents with:   Follow - Up: DM and HTN  Abdominal Pain: Pain level = 2/3 before bm      HPI:   This is a 60year old male with PMH HTN, HLD, DM2-diet controlled, [place] on CPAP, MS (1 episode in 2 melena, constipation, or diarrhea.     I reviewed his's Past Medical History, Past Surgical History, Family History and   Social History updated shows  Social History    Tobacco Use      Smoking status: Never Smoker      Smokeless tobacco: Never Used    Alc present. No thyromegaly present. Cardiovascular: Normal rate, regular rhythm, normal heart sounds and intact distal pulses. Exam reveals no gallop. No murmur heard. Edema not present.   Pulmonary/Chest: Effort normal and breath sounds normal. He has to have normocytic anemia as well as leukopenia and specifically neutropenia on labs. He was advised to schedule an appointment with his hematologist oncologist as he may require a bone marrow biopsy for further evaluation.  Neutropenia may be related to o

## 2020-03-04 ENCOUNTER — OFFICE VISIT (OUTPATIENT)
Dept: HEMATOLOGY/ONCOLOGY | Age: 60
End: 2020-03-04
Attending: INTERNAL MEDICINE
Payer: COMMERCIAL

## 2020-03-04 VITALS
SYSTOLIC BLOOD PRESSURE: 123 MMHG | WEIGHT: 195.81 LBS | HEART RATE: 89 BPM | DIASTOLIC BLOOD PRESSURE: 73 MMHG | RESPIRATION RATE: 18 BRPM | OXYGEN SATURATION: 98 % | BODY MASS INDEX: 27 KG/M2 | TEMPERATURE: 98 F

## 2020-03-04 DIAGNOSIS — D3A.021 CARCINOID TUMOR OF CECUM, UNSPECIFIED WHETHER MALIGNANT: Primary | ICD-10-CM

## 2020-03-04 DIAGNOSIS — D64.9 ANEMIA, UNSPECIFIED TYPE: ICD-10-CM

## 2020-03-04 LAB
BASOPHILS # BLD AUTO: 0.03 X10(3) UL (ref 0–0.2)
BASOPHILS NFR BLD AUTO: 0.7 %
DEPRECATED HBV CORE AB SER IA-ACNC: 140.3 NG/ML (ref 30–530)
DEPRECATED RDW RBC AUTO: 50.7 FL (ref 35.1–46.3)
EOSINOPHIL # BLD AUTO: 0.19 X10(3) UL (ref 0–0.7)
EOSINOPHIL NFR BLD AUTO: 4.3 %
ERYTHROCYTE [DISTWIDTH] IN BLOOD BY AUTOMATED COUNT: 15.9 % (ref 11–15)
FOLATE SERPL-MCNC: 65.8 NG/ML (ref 8.7–?)
HCT VFR BLD AUTO: 33.6 % (ref 39–53)
HGB BLD-MCNC: 10.9 G/DL (ref 13–17.5)
HGB RETIC QN AUTO: 32.1 PG (ref 28.2–36.6)
IMM GRANULOCYTES # BLD AUTO: 0.02 X10(3) UL (ref 0–1)
IMM GRANULOCYTES NFR BLD: 0.4 %
IMM RETICS NFR: 0.13 RATIO (ref 0.1–0.3)
IRON SATURATION: 19 % (ref 20–50)
IRON SERPL-MCNC: 102 UG/DL (ref 65–175)
LYMPHOCYTES # BLD AUTO: 1.5 X10(3) UL (ref 1–4)
LYMPHOCYTES NFR BLD AUTO: 33.6 %
MCH RBC QN AUTO: 28.3 PG (ref 26–34)
MCHC RBC AUTO-ENTMCNC: 32.4 G/DL (ref 31–37)
MCV RBC AUTO: 87.3 FL (ref 80–100)
MONOCYTES # BLD AUTO: 0.64 X10(3) UL (ref 0.1–1)
MONOCYTES NFR BLD AUTO: 14.3 %
NEUTROPHILS # BLD AUTO: 2.08 X10 (3) UL (ref 1.5–7.7)
NEUTROPHILS # BLD AUTO: 2.08 X10(3) UL (ref 1.5–7.7)
NEUTROPHILS NFR BLD AUTO: 46.7 %
PLATELET # BLD AUTO: 322 10(3)UL (ref 150–450)
RBC # BLD AUTO: 3.85 X10(6)UL (ref 4.3–5.7)
RETICS # AUTO: 54.3 X10(3) UL (ref 22.5–147.5)
RETICS/RBC NFR AUTO: 1.4 % (ref 0.5–2.5)
TOTAL IRON BINDING CAPACITY: 548 UG/DL (ref 240–450)
TRANSFERRIN SERPL-MCNC: 368 MG/DL (ref 200–360)
VIT B12 SERPL-MCNC: 408 PG/ML (ref 193–986)
WBC # BLD AUTO: 4.5 X10(3) UL (ref 4–11)

## 2020-03-04 PROCEDURE — 99213 OFFICE O/P EST LOW 20 MIN: CPT | Performed by: INTERNAL MEDICINE

## 2020-03-04 RX ORDER — FOLIC ACID 1 MG/1
TABLET ORAL DAILY
COMMUNITY

## 2020-03-06 NOTE — PROGRESS NOTES
Cancer Center Progress Note  Patient Name: Tonio Castro   YOB: 1960   Medical Record Number: [id number]     Attending Physician: Aysha Wheeler M.D. Date of Visit: 3/4/20      Chief Complaint:  Patient presents with:   Follow - Up BOWEL RESECTION     • COLONOSCOPY  01/31/2012   • COLONOSCOPY  1/2015    since colon cancer have 1 every 3 years   • EXPLORATORY LAPAROTOMY N/A 3/8/2019    Performed by Jena Michel MD at [facility] MAIN OR   • HERNIA SURGERY  05/09/2019    Abd.    • LAPAROSCOPIC Relationships      Social connections:        Talks on phone: Not on file        Gets together: Not on file        Attends Scientologist service: Not on file        Active member of club or organization: Not on file        Attends meetings of clubs or [place] Erectile Dysfunction. , Disp: 10 tablet, Rfl: 11  •  Cholecalciferol (VITAMIN D) 2000 UNITS Oral Cap, Take 2,000 Units by mouth daily. , Disp: , Rfl:   •  Cyanocobalamin (VITAMIN B 12 OR), Take 1 tablet by mouth daily.   , Disp: , Rfl:   •  Teriflunomide 14 non-distended, no masses, ascites or hepatosplenomegaly. Extremities Normal - No visible deformities, no cyanosis, clubbing or edema.     Integumentary Normal - No rashes, No Jaundice   Neurologic Normal - No sensory or motor deficits, normal cerebellar

## 2020-03-18 ENCOUNTER — TELEPHONE (OUTPATIENT)
Dept: HEMATOLOGY/ONCOLOGY | Facility: HOSPITAL | Age: 60
End: 2020-03-18

## 2020-03-18 ENCOUNTER — APPOINTMENT (OUTPATIENT)
Dept: HEMATOLOGY/ONCOLOGY | Age: 60
End: 2020-03-18
Attending: INTERNAL MEDICINE
Payer: COMMERCIAL

## 2020-03-18 DIAGNOSIS — D64.9 ANEMIA, UNSPECIFIED TYPE: Primary | ICD-10-CM

## 2020-03-18 NOTE — TELEPHONE ENCOUNTER
----- Message from Nat Trinidad MD sent at 3/18/2020  9:39 AM CDT -----  Please call the patient to cancel his Bone Marrow Biopsy tomorrow. Reschedule in 2 months. I talked to him and he is aware.      Dr. Vicenta Zaman

## 2020-03-18 NOTE — PROGRESS NOTES
Hematology Telephone Note    I discussed with the patient by phone. His blood counts have been low, but stable, for several months. He has a bone marrow biopsy scheduled for tomorrow.     Given the Novel Coronavirus Pandemic, and the stability of his count

## 2020-03-19 ENCOUNTER — APPOINTMENT (OUTPATIENT)
Dept: HEMATOLOGY/ONCOLOGY | Age: 60
End: 2020-03-19
Attending: INTERNAL MEDICINE
Payer: COMMERCIAL

## 2020-04-21 DIAGNOSIS — I10 ESSENTIAL HYPERTENSION: Primary | ICD-10-CM

## 2020-04-21 DIAGNOSIS — E78.5 DYSLIPIDEMIA: ICD-10-CM

## 2020-04-21 DIAGNOSIS — I10 ESSENTIAL HYPERTENSION: ICD-10-CM

## 2020-04-21 RX ORDER — PRAVASTATIN SODIUM 80 MG/1
TABLET ORAL
Qty: 90 TABLET | Refills: 1 | Status: SHIPPED | OUTPATIENT
Start: 2020-04-21 | End: 2020-11-04

## 2020-04-21 RX ORDER — FENOFIBRATE 145 MG/1
TABLET, COATED ORAL
Qty: 90 TABLET | Refills: 1 | Status: SHIPPED | OUTPATIENT
Start: 2020-04-21 | End: 2020-11-04

## 2020-04-21 RX ORDER — AMLODIPINE BESYLATE 10 MG/1
TABLET ORAL
Qty: 90 TABLET | Refills: 1 | Status: ON HOLD | OUTPATIENT
Start: 2020-04-21 | End: 2020-06-25

## 2020-04-21 RX ORDER — LOSARTAN POTASSIUM 50 MG/1
TABLET ORAL
Qty: 90 TABLET | Refills: 1 | Status: SHIPPED | OUTPATIENT
Start: 2020-04-21 | End: 2020-10-05

## 2020-05-26 ENCOUNTER — TELEPHONE (OUTPATIENT)
Dept: HEMATOLOGY/ONCOLOGY | Facility: HOSPITAL | Age: 60
End: 2020-05-26

## 2020-05-26 NOTE — TELEPHONE ENCOUNTER
Patient is having hip surgery and his numbers are kind of low and he need to speak to Dr. Kerrie Carrasco before proceeding with Surgery.

## 2020-05-26 NOTE — TELEPHONE ENCOUNTER
I discussed with the patient by phone. His counts from March were adequate for surgery, but I recommend repeating before making a decision.     If the counts are stable, proceed with surgery, but perhaps the orthopedic surgeon can send material to the lab

## 2020-05-28 ENCOUNTER — TELEPHONE (OUTPATIENT)
Dept: INTERNAL MEDICINE CLINIC | Facility: CLINIC | Age: 60
End: 2020-05-28

## 2020-05-28 NOTE — TELEPHONE ENCOUNTER
Pre-op clearance faxed to Dr Miguel Vick, via Epic, at [phone]  To follow up that completed form received back

## 2020-05-28 NOTE — TELEPHONE ENCOUNTER
Date of pre-op appt:  6/4/20  Provider pre-op scheduled with: Dr Caitlin Panda  Surgeon's name:  Dr Wayne Muñoz   Phone #:  [phone]   Fax #:  unknown  Surgery date:  6/24/20  Procedure/diagnosis:  Right hip replacement    Patient has a pre-surgical appt with

## 2020-06-02 RX ORDER — SCOLOPAMINE TRANSDERMAL SYSTEM 1 MG/1
1 PATCH, EXTENDED RELEASE TRANSDERMAL ONCE
Status: CANCELLED | OUTPATIENT
Start: 2020-06-02 | End: 2020-06-02

## 2020-06-02 RX ORDER — ACETAMINOPHEN 500 MG
1000 TABLET ORAL ONCE
Status: CANCELLED | OUTPATIENT
Start: 2020-06-02 | End: 2020-06-02

## 2020-06-02 RX ORDER — SODIUM CHLORIDE, SODIUM LACTATE, POTASSIUM CHLORIDE, CALCIUM CHLORIDE 600; 310; 30; 20 MG/100ML; MG/100ML; MG/100ML; MG/100ML
INJECTION, SOLUTION INTRAVENOUS CONTINUOUS
Status: CANCELLED | OUTPATIENT
Start: 2020-06-02

## 2020-06-03 NOTE — TELEPHONE ENCOUNTER
Received testing result request from pre-admission testing (also in letters in epic). It does NOT state medical clearance needed. Sent 3rd request for medical clearance form to be completed, sent to [facility] this time at fax #[phone].

## 2020-06-04 ENCOUNTER — OFFICE VISIT (OUTPATIENT)
Dept: INTERNAL MEDICINE CLINIC | Facility: CLINIC | Age: 60
End: 2020-06-04
Payer: COMMERCIAL

## 2020-06-04 VITALS
TEMPERATURE: 98 F | DIASTOLIC BLOOD PRESSURE: 62 MMHG | HEART RATE: 80 BPM | WEIGHT: 194 LBS | SYSTOLIC BLOOD PRESSURE: 132 MMHG | BODY MASS INDEX: 26.28 KG/M2 | HEIGHT: 72 IN | OXYGEN SATURATION: 96 % | RESPIRATION RATE: 14 BRPM

## 2020-06-04 DIAGNOSIS — Z01.818 PRE-OP EXAM: Primary | ICD-10-CM

## 2020-06-04 DIAGNOSIS — G35 MULTIPLE SCLEROSIS (HCC): ICD-10-CM

## 2020-06-04 DIAGNOSIS — E78.2 MIXED HYPERLIPIDEMIA: ICD-10-CM

## 2020-06-04 DIAGNOSIS — Z99.89 OSA ON CPAP: ICD-10-CM

## 2020-06-04 DIAGNOSIS — E11.9 TYPE 2 DIABETES MELLITUS WITHOUT COMPLICATION, WITHOUT LONG-TERM CURRENT USE OF INSULIN (HCC): ICD-10-CM

## 2020-06-04 DIAGNOSIS — I10 ESSENTIAL HYPERTENSION: ICD-10-CM

## 2020-06-04 DIAGNOSIS — M16.11 PRIMARY OSTEOARTHRITIS OF RIGHT HIP: ICD-10-CM

## 2020-06-04 DIAGNOSIS — G47.33 OSA ON CPAP: ICD-10-CM

## 2020-06-04 PROCEDURE — 99243 OFF/OP CNSLTJ NEW/EST LOW 30: CPT | Performed by: INTERNAL MEDICINE

## 2020-06-04 PROCEDURE — 93000 ELECTROCARDIOGRAM COMPLETE: CPT | Performed by: INTERNAL MEDICINE

## 2020-06-04 NOTE — PROGRESS NOTES
Preoperative History and Physical    Cc:   Patient presents with:  Pre-Op Exam: Surgery on 6/24/2020 Rt Anteriior Total Hip Replacement       HPI: Jennifer Gross is 63year old presenting for a preoperative exam.    Diagnosis: Primary osteoarthritis right since colon cancer have 1 every 3 years   • EXPLORATORY LAPAROTOMY N/A 3/8/2019    Performed by Rivera Hui MD at [address]   • FRACTURE SURGERY      Fracture of right 4th finger repaired - no metal   • HERNIA SURGERY  05/09/2019    Abd.    • LAPAROSCOP mouth daily as needed for Erectile Dysfunction. , Disp: 10 tablet, Rfl: 11  •  Cholecalciferol (VITAMIN D) 2000 UNITS Oral Cap, Take 2,000 Units by mouth daily. , Disp: , Rfl:   •  Cyanocobalamin (VITAMIN B 12 OR), Take 1 tablet by mouth daily.   , Disp: , 194 lb (88 kg)   SpO2 96%   BMI 26.31 kg/m²   Physical Exam   Physical Exam  Constitutional: Sitting comfortably in no acute distress  HENT: B/L ear canal wax build-up, Hearing intact, No thyromegaly  Nose: Moist and clear nasal mucosa  Eyes: PERRL, EOMI, NSQIP surgical risk calculator finds patient to be an average to below-average risk candidate for the proposed procedure. He's not taking any blood-thinners, aspirin or NSAIDs.  Patient maybe medically cleared to proceed with surgery pending clearance from

## 2020-06-04 NOTE — PROGRESS NOTES
Per Dr. Lacy Chavez, faxed notes from Pre-Op today, EKG and signed ACS NSQIP Surgical Risk Calcultor form to Dr. Heddie Peabody MD.  Rec'd fax confirmation. Sent EKG and FORM to scan.   Holding a paper copy of all faxed documents at my desk in a folder labeled Dr. Teena Sánchez

## 2020-06-08 ENCOUNTER — LAB ENCOUNTER (OUTPATIENT)
Dept: LAB | Age: 60
End: 2020-06-08
Attending: INTERNAL MEDICINE
Payer: COMMERCIAL

## 2020-06-08 DIAGNOSIS — M16.11 PRIMARY OSTEOARTHRITIS OF RIGHT HIP: ICD-10-CM

## 2020-06-08 DIAGNOSIS — D64.9 ANEMIA, UNSPECIFIED TYPE: ICD-10-CM

## 2020-06-08 DIAGNOSIS — Z01.818 PRE-OP TESTING: ICD-10-CM

## 2020-06-08 DIAGNOSIS — Z01.818 PRE-OP EXAM: ICD-10-CM

## 2020-06-08 PROCEDURE — 80053 COMPREHEN METABOLIC PANEL: CPT | Performed by: INTERNAL MEDICINE

## 2020-06-08 PROCEDURE — 85730 THROMBOPLASTIN TIME PARTIAL: CPT | Performed by: INTERNAL MEDICINE

## 2020-06-08 PROCEDURE — 36415 COLL VENOUS BLD VENIPUNCTURE: CPT | Performed by: INTERNAL MEDICINE

## 2020-06-08 PROCEDURE — 85610 PROTHROMBIN TIME: CPT | Performed by: INTERNAL MEDICINE

## 2020-06-15 ENCOUNTER — HOSPITAL ENCOUNTER (OUTPATIENT)
Dept: NUCLEAR MEDICINE | Facility: HOSPITAL | Age: 60
Discharge: HOME | End: 2020-06-15
Attending: INTERNAL MEDICINE
Payer: COMMERCIAL

## 2020-06-15 DIAGNOSIS — D3A.021 CARCINOID TUMOR OF CECUM, UNSPECIFIED WHETHER MALIGNANT: ICD-10-CM

## 2020-06-15 PROCEDURE — 78815 PET IMAGE W/CT SKULL-THIGH: CPT | Performed by: INTERNAL MEDICINE

## 2020-06-16 ENCOUNTER — TELEPHONE (OUTPATIENT)
Dept: HEMATOLOGY/ONCOLOGY | Facility: HOSPITAL | Age: 60
End: 2020-06-16

## 2020-06-16 NOTE — TELEPHONE ENCOUNTER
Christiano Oseguera is calling because he need the Bone Marrow Biopsy appointment cancelled because they are getting the Biopsy during his Hip Surgery.

## 2020-06-18 ENCOUNTER — APPOINTMENT (OUTPATIENT)
Dept: HEMATOLOGY/ONCOLOGY | Age: 60
End: 2020-06-18
Attending: INTERNAL MEDICINE
Payer: COMMERCIAL

## 2020-06-18 ENCOUNTER — APPOINTMENT (OUTPATIENT)
Dept: HEMATOLOGY/ONCOLOGY | Facility: HOSPITAL | Age: 60
End: 2020-06-18
Attending: INTERNAL MEDICINE
Payer: COMMERCIAL

## 2020-06-22 ENCOUNTER — LAB ENCOUNTER (OUTPATIENT)
Dept: LAB | Facility: HOSPITAL | Age: 60
End: 2020-06-22
Attending: ORTHOPAEDIC SURGERY
Payer: COMMERCIAL

## 2020-06-22 DIAGNOSIS — Z01.818 PRE-OP TESTING: ICD-10-CM

## 2020-06-24 ENCOUNTER — ANESTHESIA (OUTPATIENT)
Dept: SURGERY | Facility: HOSPITAL | Age: 60
DRG: 470 | End: 2020-06-24
Payer: COMMERCIAL

## 2020-06-24 ENCOUNTER — ANESTHESIA EVENT (OUTPATIENT)
Dept: SURGERY | Facility: HOSPITAL | Age: 60
DRG: 470 | End: 2020-06-24
Payer: COMMERCIAL

## 2020-06-24 ENCOUNTER — HOSPITAL ENCOUNTER (INPATIENT)
Facility: HOSPITAL | Age: 60
LOS: 1 days | Discharge: HOME HEALTH | DRG: 470 | End: 2020-06-25
Attending: ORTHOPAEDIC SURGERY | Admitting: ORTHOPAEDIC SURGERY
Payer: COMMERCIAL

## 2020-06-24 ENCOUNTER — APPOINTMENT (OUTPATIENT)
Dept: GENERAL RADIOLOGY | Facility: HOSPITAL | Age: 60
DRG: 470 | End: 2020-06-24
Attending: ORTHOPAEDIC SURGERY
Payer: COMMERCIAL

## 2020-06-24 DIAGNOSIS — Z01.818 PRE-OP TESTING: Primary | ICD-10-CM

## 2020-06-24 DIAGNOSIS — M16.11 PRIMARY OSTEOARTHRITIS OF RIGHT HIP: ICD-10-CM

## 2020-06-24 LAB
INR BLD: 1.29 (ref 0.89–1.11)
PSA SERPL DL<=0.01 NG/ML-MCNC: 16.5 SECONDS (ref 12.4–14.6)

## 2020-06-24 PROCEDURE — 0SR904A REPLACEMENT OF RIGHT HIP JOINT WITH CERAMIC ON POLYETHYLENE SYNTHETIC SUBSTITUTE, UNCEMENTED, OPEN APPROACH: ICD-10-PCS | Performed by: ORTHOPAEDIC SURGERY

## 2020-06-24 PROCEDURE — 99223 1ST HOSP IP/OBS HIGH 75: CPT | Performed by: HOSPITALIST

## 2020-06-24 PROCEDURE — 76000 FLUOROSCOPY <1 HR PHYS/QHP: CPT | Performed by: ORTHOPAEDIC SURGERY

## 2020-06-24 DEVICE — PINNACLE HIP SOLUTIONS ALTRX POLYETHYLENE ACETABULAR LINER NEUTRAL 36MM ID 60MM OD
Type: IMPLANTABLE DEVICE | Site: HIP | Status: FUNCTIONAL
Brand: PINNACLE ALTRX

## 2020-06-24 DEVICE — PINNACLE GRIPTION ACETABULAR SHELL SECTOR 60MM OD
Type: IMPLANTABLE DEVICE | Site: HIP | Status: FUNCTIONAL
Brand: PINNACLE GRIPTION

## 2020-06-24 DEVICE — CORAIL HIP SYSTEM CEMENTLESS FEMORAL STEM HA COATED 12/14 AMT 135 DEGREES HIGH OFFSET COLLAR SIZE 11
Type: IMPLANTABLE DEVICE | Site: HIP | Status: FUNCTIONAL
Brand: CORAIL

## 2020-06-24 DEVICE — BIOLOX DELTA CERAMIC FEMORAL HEAD +1.5 36MM DIA 12/14 TAPER
Type: IMPLANTABLE DEVICE | Site: HIP | Status: FUNCTIONAL
Brand: BIOLOX DELTA

## 2020-06-24 RX ORDER — SODIUM CHLORIDE, SODIUM LACTATE, POTASSIUM CHLORIDE, CALCIUM CHLORIDE 600; 310; 30; 20 MG/100ML; MG/100ML; MG/100ML; MG/100ML
INJECTION, SOLUTION INTRAVENOUS CONTINUOUS
Status: DISCONTINUED | OUTPATIENT
Start: 2020-06-24 | End: 2020-06-24 | Stop reason: HOSPADM

## 2020-06-24 RX ORDER — DEXAMETHASONE SODIUM PHOSPHATE 10 MG/ML
8 INJECTION, SOLUTION INTRAMUSCULAR; INTRAVENOUS ONCE
Status: COMPLETED | OUTPATIENT
Start: 2020-06-25 | End: 2020-06-25

## 2020-06-24 RX ORDER — DIPHENHYDRAMINE HYDROCHLORIDE 50 MG/ML
25 INJECTION INTRAMUSCULAR; INTRAVENOUS ONCE AS NEEDED
Status: ACTIVE | OUTPATIENT
Start: 2020-06-24 | End: 2020-06-24

## 2020-06-24 RX ORDER — OMEGA-3-ACID ETHYL ESTERS 1 G/1
1 CAPSULE, LIQUID FILLED ORAL DAILY
Status: DISCONTINUED | OUTPATIENT
Start: 2020-06-24 | End: 2020-06-24

## 2020-06-24 RX ORDER — LABETALOL HYDROCHLORIDE 5 MG/ML
10 INJECTION, SOLUTION INTRAVENOUS EVERY 4 HOURS PRN
Status: DISCONTINUED | OUTPATIENT
Start: 2020-06-24 | End: 2020-06-25

## 2020-06-24 RX ORDER — OXYCODONE HYDROCHLORIDE 10 MG/1
10 TABLET ORAL EVERY 4 HOURS PRN
Status: DISCONTINUED | OUTPATIENT
Start: 2020-06-24 | End: 2020-06-25

## 2020-06-24 RX ORDER — METOCLOPRAMIDE HYDROCHLORIDE 5 MG/ML
10 INJECTION INTRAMUSCULAR; INTRAVENOUS EVERY 6 HOURS PRN
Status: DISCONTINUED | OUTPATIENT
Start: 2020-06-24 | End: 2020-06-25

## 2020-06-24 RX ORDER — DOCUSATE SODIUM 100 MG/1
100 CAPSULE, LIQUID FILLED ORAL 2 TIMES DAILY
Status: DISCONTINUED | OUTPATIENT
Start: 2020-06-24 | End: 2020-06-25

## 2020-06-24 RX ORDER — TRANEXAMIC ACID 10 MG/ML
1000 INJECTION, SOLUTION INTRAVENOUS
Status: COMPLETED | OUTPATIENT
Start: 2020-06-24 | End: 2020-06-24

## 2020-06-24 RX ORDER — LIDOCAINE HYDROCHLORIDE 10 MG/ML
INJECTION, SOLUTION EPIDURAL; INFILTRATION; INTRACAUDAL; PERINEURAL AS NEEDED
Status: DISCONTINUED | OUTPATIENT
Start: 2020-06-24 | End: 2020-06-24 | Stop reason: SURG

## 2020-06-24 RX ORDER — ACETAMINOPHEN 325 MG/1
TABLET ORAL
Status: COMPLETED
Start: 2020-06-24 | End: 2020-06-24

## 2020-06-24 RX ORDER — ONDANSETRON 2 MG/ML
INJECTION INTRAMUSCULAR; INTRAVENOUS AS NEEDED
Status: DISCONTINUED | OUTPATIENT
Start: 2020-06-24 | End: 2020-06-24 | Stop reason: SURG

## 2020-06-24 RX ORDER — ATORVASTATIN CALCIUM 20 MG/1
20 TABLET, FILM COATED ORAL NIGHTLY
Status: DISCONTINUED | OUTPATIENT
Start: 2020-06-24 | End: 2020-06-25

## 2020-06-24 RX ORDER — HYDROMORPHONE HYDROCHLORIDE 1 MG/ML
0.2 INJECTION, SOLUTION INTRAMUSCULAR; INTRAVENOUS; SUBCUTANEOUS EVERY 2 HOUR PRN
Status: DISCONTINUED | OUTPATIENT
Start: 2020-06-24 | End: 2020-06-25

## 2020-06-24 RX ORDER — SODIUM CHLORIDE 9 MG/ML
INJECTION, SOLUTION INTRAVENOUS CONTINUOUS
Status: DISCONTINUED | OUTPATIENT
Start: 2020-06-24 | End: 2020-06-25

## 2020-06-24 RX ORDER — TEMAZEPAM 15 MG/1
15 CAPSULE ORAL NIGHTLY PRN
Status: DISCONTINUED | OUTPATIENT
Start: 2020-06-24 | End: 2020-06-25

## 2020-06-24 RX ORDER — HYDROMORPHONE HYDROCHLORIDE 1 MG/ML
0.8 INJECTION, SOLUTION INTRAMUSCULAR; INTRAVENOUS; SUBCUTANEOUS EVERY 2 HOUR PRN
Status: DISCONTINUED | OUTPATIENT
Start: 2020-06-24 | End: 2020-06-25

## 2020-06-24 RX ORDER — KETOROLAC TROMETHAMINE 30 MG/ML
30 INJECTION, SOLUTION INTRAMUSCULAR; INTRAVENOUS EVERY 6 HOURS
Status: COMPLETED | OUTPATIENT
Start: 2020-06-24 | End: 2020-06-25

## 2020-06-24 RX ORDER — ONDANSETRON 2 MG/ML
4 INJECTION INTRAMUSCULAR; INTRAVENOUS AS NEEDED
Status: DISCONTINUED | OUTPATIENT
Start: 2020-06-24 | End: 2020-06-24 | Stop reason: HOSPADM

## 2020-06-24 RX ORDER — TIZANIDINE 4 MG/1
4 TABLET ORAL 3 TIMES DAILY PRN
Status: DISCONTINUED | OUTPATIENT
Start: 2020-06-24 | End: 2020-06-25

## 2020-06-24 RX ORDER — DEXTROSE MONOHYDRATE 25 G/50ML
50 INJECTION, SOLUTION INTRAVENOUS
Status: DISCONTINUED | OUTPATIENT
Start: 2020-06-24 | End: 2020-06-24 | Stop reason: HOSPADM

## 2020-06-24 RX ORDER — DEXAMETHASONE SODIUM PHOSPHATE 4 MG/ML
4 VIAL (ML) INJECTION AS NEEDED
Status: DISCONTINUED | OUTPATIENT
Start: 2020-06-24 | End: 2020-06-24 | Stop reason: HOSPADM

## 2020-06-24 RX ORDER — OXYCODONE HYDROCHLORIDE 15 MG/1
15 TABLET ORAL EVERY 4 HOURS PRN
Status: DISCONTINUED | OUTPATIENT
Start: 2020-06-24 | End: 2020-06-25

## 2020-06-24 RX ORDER — MIDAZOLAM HYDROCHLORIDE 1 MG/ML
INJECTION INTRAMUSCULAR; INTRAVENOUS AS NEEDED
Status: DISCONTINUED | OUTPATIENT
Start: 2020-06-24 | End: 2020-06-24 | Stop reason: SURG

## 2020-06-24 RX ORDER — FENOFIBRATE 134 MG/1
134 CAPSULE ORAL
Status: DISCONTINUED | OUTPATIENT
Start: 2020-06-25 | End: 2020-06-25

## 2020-06-24 RX ORDER — HYDROMORPHONE HYDROCHLORIDE 1 MG/ML
INJECTION, SOLUTION INTRAMUSCULAR; INTRAVENOUS; SUBCUTANEOUS
Status: COMPLETED
Start: 2020-06-24 | End: 2020-06-24

## 2020-06-24 RX ORDER — BISACODYL 10 MG
10 SUPPOSITORY, RECTAL RECTAL
Status: DISCONTINUED | OUTPATIENT
Start: 2020-06-24 | End: 2020-06-25

## 2020-06-24 RX ORDER — METOCLOPRAMIDE HYDROCHLORIDE 5 MG/ML
10 INJECTION INTRAMUSCULAR; INTRAVENOUS AS NEEDED
Status: DISCONTINUED | OUTPATIENT
Start: 2020-06-24 | End: 2020-06-24 | Stop reason: HOSPADM

## 2020-06-24 RX ORDER — HYDROCODONE BITARTRATE AND ACETAMINOPHEN 5; 325 MG/1; MG/1
2 TABLET ORAL AS NEEDED
Status: DISCONTINUED | OUTPATIENT
Start: 2020-06-24 | End: 2020-06-24 | Stop reason: HOSPADM

## 2020-06-24 RX ORDER — HYDROMORPHONE HYDROCHLORIDE 1 MG/ML
0.4 INJECTION, SOLUTION INTRAMUSCULAR; INTRAVENOUS; SUBCUTANEOUS EVERY 2 HOUR PRN
Status: DISCONTINUED | OUTPATIENT
Start: 2020-06-24 | End: 2020-06-25

## 2020-06-24 RX ORDER — ACETAMINOPHEN 500 MG
1000 TABLET ORAL 4 TIMES DAILY
Status: DISCONTINUED | OUTPATIENT
Start: 2020-06-24 | End: 2020-06-25

## 2020-06-24 RX ORDER — SODIUM CHLORIDE, SODIUM LACTATE, POTASSIUM CHLORIDE, CALCIUM CHLORIDE 600; 310; 30; 20 MG/100ML; MG/100ML; MG/100ML; MG/100ML
INJECTION, SOLUTION INTRAVENOUS CONTINUOUS
Status: DISCONTINUED | OUTPATIENT
Start: 2020-06-24 | End: 2020-06-25

## 2020-06-24 RX ORDER — ONDANSETRON 2 MG/ML
4 INJECTION INTRAMUSCULAR; INTRAVENOUS EVERY 4 HOURS PRN
Status: DISCONTINUED | OUTPATIENT
Start: 2020-06-24 | End: 2020-06-25

## 2020-06-24 RX ORDER — TRAMADOL HYDROCHLORIDE 50 MG/1
50 TABLET ORAL EVERY 6 HOURS
Status: DISCONTINUED | OUTPATIENT
Start: 2020-06-24 | End: 2020-06-25

## 2020-06-24 RX ORDER — OXYCODONE HYDROCHLORIDE 5 MG/1
5 TABLET ORAL EVERY 4 HOURS PRN
Status: DISCONTINUED | OUTPATIENT
Start: 2020-06-24 | End: 2020-06-25

## 2020-06-24 RX ORDER — HYDROMORPHONE HYDROCHLORIDE 1 MG/ML
0.4 INJECTION, SOLUTION INTRAMUSCULAR; INTRAVENOUS; SUBCUTANEOUS EVERY 5 MIN PRN
Status: DISCONTINUED | OUTPATIENT
Start: 2020-06-24 | End: 2020-06-24 | Stop reason: HOSPADM

## 2020-06-24 RX ORDER — SODIUM PHOSPHATE, DIBASIC AND SODIUM PHOSPHATE, MONOBASIC 7; 19 G/133ML; G/133ML
1 ENEMA RECTAL ONCE AS NEEDED
Status: DISCONTINUED | OUTPATIENT
Start: 2020-06-24 | End: 2020-06-25

## 2020-06-24 RX ORDER — ACETAMINOPHEN 500 MG
1000 TABLET ORAL ONCE
Status: DISCONTINUED | OUTPATIENT
Start: 2020-06-24 | End: 2020-06-24

## 2020-06-24 RX ORDER — CEFAZOLIN SODIUM/WATER 2 G/20 ML
2 SYRINGE (ML) INTRAVENOUS ONCE
Status: COMPLETED | OUTPATIENT
Start: 2020-06-24 | End: 2020-06-24

## 2020-06-24 RX ORDER — PROCHLORPERAZINE EDISYLATE 5 MG/ML
10 INJECTION INTRAMUSCULAR; INTRAVENOUS EVERY 6 HOURS PRN
Status: DISCONTINUED | OUTPATIENT
Start: 2020-06-24 | End: 2020-06-25

## 2020-06-24 RX ORDER — POLYETHYLENE GLYCOL 3350 17 G/17G
17 POWDER, FOR SOLUTION ORAL DAILY PRN
Status: DISCONTINUED | OUTPATIENT
Start: 2020-06-24 | End: 2020-06-25

## 2020-06-24 RX ORDER — DEXAMETHASONE SODIUM PHOSPHATE 4 MG/ML
VIAL (ML) INJECTION AS NEEDED
Status: DISCONTINUED | OUTPATIENT
Start: 2020-06-24 | End: 2020-06-24 | Stop reason: SURG

## 2020-06-24 RX ORDER — FOLIC ACID 1 MG/1
1 TABLET ORAL DAILY
Status: DISCONTINUED | OUTPATIENT
Start: 2020-06-24 | End: 2020-06-25

## 2020-06-24 RX ORDER — HYDROCODONE BITARTRATE AND ACETAMINOPHEN 5; 325 MG/1; MG/1
1 TABLET ORAL AS NEEDED
Status: DISCONTINUED | OUTPATIENT
Start: 2020-06-24 | End: 2020-06-24 | Stop reason: HOSPADM

## 2020-06-24 RX ORDER — NALOXONE HYDROCHLORIDE 0.4 MG/ML
80 INJECTION, SOLUTION INTRAMUSCULAR; INTRAVENOUS; SUBCUTANEOUS AS NEEDED
Status: DISCONTINUED | OUTPATIENT
Start: 2020-06-24 | End: 2020-06-24 | Stop reason: HOSPADM

## 2020-06-24 RX ORDER — BUPIVACAINE HYDROCHLORIDE 7.5 MG/ML
INJECTION, SOLUTION INTRASPINAL AS NEEDED
Status: DISCONTINUED | OUTPATIENT
Start: 2020-06-24 | End: 2020-06-24 | Stop reason: SURG

## 2020-06-24 RX ORDER — SCOLOPAMINE TRANSDERMAL SYSTEM 1 MG/1
1 PATCH, EXTENDED RELEASE TRANSDERMAL ONCE
Status: DISCONTINUED | OUTPATIENT
Start: 2020-06-24 | End: 2020-06-24 | Stop reason: HOSPADM

## 2020-06-24 RX ORDER — ACETAMINOPHEN 325 MG/1
650 TABLET ORAL ONCE
Status: COMPLETED | OUTPATIENT
Start: 2020-06-24 | End: 2020-06-24

## 2020-06-24 RX ORDER — ACETAMINOPHEN 500 MG
500 TABLET ORAL 4 TIMES DAILY
Qty: 120 TABLET | Refills: 0 | Status: SHIPPED | OUTPATIENT
Start: 2020-06-24 | End: 2021-11-30 | Stop reason: ALTCHOICE

## 2020-06-24 RX ORDER — DIPHENHYDRAMINE HYDROCHLORIDE 50 MG/ML
12.5 INJECTION INTRAMUSCULAR; INTRAVENOUS EVERY 4 HOURS PRN
Status: DISCONTINUED | OUTPATIENT
Start: 2020-06-24 | End: 2020-06-25

## 2020-06-24 RX ORDER — SENNOSIDES 8.6 MG
17.2 TABLET ORAL NIGHTLY
Status: DISCONTINUED | OUTPATIENT
Start: 2020-06-24 | End: 2020-06-25

## 2020-06-24 RX ORDER — DIPHENHYDRAMINE HCL 25 MG
25 CAPSULE ORAL EVERY 4 HOURS PRN
Status: DISCONTINUED | OUTPATIENT
Start: 2020-06-24 | End: 2020-06-25

## 2020-06-24 RX ORDER — CEFAZOLIN SODIUM/WATER 2 G/20 ML
2 SYRINGE (ML) INTRAVENOUS EVERY 8 HOURS
Status: COMPLETED | OUTPATIENT
Start: 2020-06-24 | End: 2020-06-25

## 2020-06-24 RX ORDER — ACETAMINOPHEN 500 MG
500 TABLET ORAL EVERY 6 HOURS PRN
Status: ON HOLD | COMMUNITY
End: 2020-06-24

## 2020-06-24 RX ORDER — KETAMINE HYDROCHLORIDE 50 MG/ML
INJECTION, SOLUTION, CONCENTRATE INTRAMUSCULAR; INTRAVENOUS AS NEEDED
Status: DISCONTINUED | OUTPATIENT
Start: 2020-06-24 | End: 2020-06-24 | Stop reason: SURG

## 2020-06-24 RX ADMIN — KETAMINE HYDROCHLORIDE 50 MG: 50 INJECTION, SOLUTION, CONCENTRATE INTRAMUSCULAR; INTRAVENOUS at 09:30:00

## 2020-06-24 RX ADMIN — SODIUM CHLORIDE, SODIUM LACTATE, POTASSIUM CHLORIDE, CALCIUM CHLORIDE: 600; 310; 30; 20 INJECTION, SOLUTION INTRAVENOUS at 11:22:00

## 2020-06-24 RX ADMIN — LIDOCAINE HYDROCHLORIDE 50 MG: 10 INJECTION, SOLUTION EPIDURAL; INFILTRATION; INTRACAUDAL; PERINEURAL at 09:29:00

## 2020-06-24 RX ADMIN — TRANEXAMIC ACID 1000 MG: 10 INJECTION, SOLUTION INTRAVENOUS at 09:42:00

## 2020-06-24 RX ADMIN — BUPIVACAINE HYDROCHLORIDE 1.3 ML: 7.5 INJECTION, SOLUTION INTRASPINAL at 09:22:00

## 2020-06-24 RX ADMIN — CEFAZOLIN SODIUM/WATER 2 G: 2 G/20 ML SYRINGE (ML) INTRAVENOUS at 09:41:00

## 2020-06-24 RX ADMIN — MIDAZOLAM HYDROCHLORIDE 2 MG: 1 INJECTION INTRAMUSCULAR; INTRAVENOUS at 09:22:00

## 2020-06-24 RX ADMIN — LIDOCAINE HYDROCHLORIDE 100 MG: 10 INJECTION, SOLUTION EPIDURAL; INFILTRATION; INTRACAUDAL; PERINEURAL at 09:52:00

## 2020-06-24 RX ADMIN — DEXAMETHASONE SODIUM PHOSPHATE 4 MG: 4 MG/ML VIAL (ML) INJECTION at 09:29:00

## 2020-06-24 RX ADMIN — ONDANSETRON 4 MG: 2 INJECTION INTRAMUSCULAR; INTRAVENOUS at 11:07:00

## 2020-06-24 NOTE — PROGRESS NOTES
Kennedy Barth   6/9/2020 10:00 AM   Office Visit   MRN:  [id number]   Description: 63year old male Provider: Nicolas Bowles MD Department: [facility]   Scanning Cover Sheet     Click to print Barcode Encounter Cover Sheet for scanning   Office Visit • Pravastatin Sodium 80 MG Oral Tab TAKE 1 TABLET BY MOUTH DAILY GENERIC EQUIVALENT FOR PRAVACHOL 90 tablet 1   • AMLODIPINE BESYLATE 10 MG Oral Tab TAKE 1 TABLET BY MOUTH DAILY 90 tablet 1   • LOSARTAN POTASSIUM 50 MG Oral Tab TAKE 1 TABLET BY MOUTH AT BE • LAPAROSCOPIC COLON RESECTION - RIGHT N/A 2/5/2019     Performed by Neena Mcgowan MD at [address] [place] [address]   • LAPAROSCOPY, SURGICAL; COLECTOMY, PARTIAL, W/ ANASTOMOS   2/2011   • OTHER SURGICAL HISTORY   02/05/2019     Laparoscopic-assisted small bowel resectio Labs were reviewed. Outside of his anemia, everything looks satisfactory. Hemoglobin is stable. We discussed increased risk of blood transfusion at his anemic state.   I do not believe there is any absolute contraindication to doing the surgery.     He h

## 2020-06-24 NOTE — PHYSICAL THERAPY NOTE
PHYSICAL THERAPY HIP EVALUATION - INPATIENT     Room Number: 734/627-E  Evaluation Date: 6/24/2020  Type of Evaluation: Initial  Physician Order: PT Eval and Treat    Presenting Problem: s/p anterior R JANINE 6/24/20  Reason for Therapy: Mobility Dysfunction to Enter : 2  Railing: Yes  Stairs to Bedroom: 12  Railing: Yes    Lives With: Spouse  Drives: Yes  Patient Owned Equipment: (rollator)  Patient Regularly Uses: Glasses    Prior Level of [place]: Pt lives with spouse in 2 story home.  Pt typically inde patient currently need. ..   -   Moving to and from a bed to a chair (including a wheelchair)?: A Little   -   Need to walk in hospital room?: A Little   -   Climbing 3-5 steps with a railing?: A Little       AM-PAC Score:  Raw Score: 18   Approx Degree of following impairments R hip pain, R LE strength deficits, balance impairments, and decreased knowledge of hip precautions. Functional outcome measures completed include AMPAC.  Based on this evaluation, patient's clinical presentation is stable and overall

## 2020-06-24 NOTE — H&P
Aria Zheng   6/4/2020 10:00 AM   Office Visit   MRN:  [id number]   Description: 63year old male Provider: Agustina Smith MD Department: [facility]   Scanning Cover Sheet     Click to print Barcode Encounter Cover Sheet for scanning   Office Visit Bennie Hinton is a 25-year-old male with past medical history of obstructive sleep apnea on CPAP, type 2 diabetes (diet-controlled), multiple sclerosis (Follows with Dr. June Jauregui at [facility]), hypertension, hyperlipidemia, carcinoid tumor status post resect Medical History:       Past Medical History:   Diagnosis Date   • Calculus of kidney 1980   • Cancer [facility]) 2011     colon   • Carcinoid tumor       cecal carcinoid   • Chest pain     • Colon polyps     • Diverticulitis       left colon   • Diverticulosis   • Other (melanoma) Father           and pts uncle   • Other (multiple sclerosis) Mother     • Other (Other) Mother           MS   • Other Mother           MS   • Other (breast cancer) Maternal Grandmother     • Other (heart attack) Paternal Grandmother 79 Lymphadenopathy: No cervical adenopathy  Neurological:  AOX3, CN II-XII intact, Strength and sensory exam grossly normal in all extremities.    Skin: No rashes  Psychiatric: Normal mood and affect     Labs: Ordered comprehensive metabolic panel, PT/INR, and Patient is a 60yo M with PMH and meds as listed above presenting for pre-op eval for R hip total arthroplasty. He is in stable health currently. He is physically active and denies any cardiopulmonary complaints.  NSQIP surgical risk calculator finds patient Electronically signed by Louisa Cook MD on 6/4/20 at 12:26 PM     ADDENDUM:    The above referenced H&P was reviewed by Lord Anais MD on 6/24/2020, the patient was examined and no significant changes have occurred in the patient's condition since th

## 2020-06-24 NOTE — CONSULTS
[facility]ITAL[facility]  [address]El[address] Riggs Patient Status:  Inpatient    1960 MRN [id number]   [facility] 3SW-A Attending Leandra Manzanares MD   Hosp Day # 0 PCP Elio Berkowitz MD     Reason for consult: medical management    Reques incisional hernia   • TONSILLECTOMY     • VASECTOMY  1990       Social History:  reports that he has never smoked. He has never used smokeless tobacco. He reports current alcohol use of about 2.0 - 3.0 standard drinks of alcohol per week.  He reports that h review of systems was completed. Pertinent positives and negatives noted in the HPI.     Physical Exam:    /64 (BP Location: Left arm)   Pulse 67   Temp 98.4 °F (36.9 °C) (Oral)   Resp 18   Ht 6' (1.829 m)   Wt 191 lb 2.2 oz (86.7 kg)   SpO2 97%

## 2020-06-24 NOTE — PROGRESS NOTES
Care partner (wife) at bedside. Reviewed indications, side effects of pain medication/narcotics and constipation prevention.  Stressed importance of increased fluids/roughage in diet, continued use of stool softeners along with laxatives and suppositories a

## 2020-06-24 NOTE — ADDENDUM NOTE
Addendum  created 06/24/20 1431 by Payal Retana MD    Child order released for a procedure order, Clinical Note Signed, Intraprocedure Blocks edited

## 2020-06-24 NOTE — ANESTHESIA POSTPROCEDURE EVALUATION
[facility] Patient Status:  Inpatient   Age/Gender 63year old male MRN [id number]   Location [facility] Attending Jennifer Lorenzo MD   Hosp Day # 0 PCP Batsheva Harper MD       Anesthesia Post-op Note

## 2020-06-24 NOTE — PLAN OF CARE
Received pt from pacu this afternoon. HIMANSHU. Dressing dry and intact to right hip. Worked with therapy. Sitting up in chair. States pain minimal.  Seen by Dr. Jill Frank. Wife at bedside. Verbalized understanding of POC and fall precautions.   Will continu

## 2020-06-24 NOTE — OPERATIVE REPORT
[address] REPLACEMENT OPERATIVE REPORT    DATE OF SURGERY 6/24/2020    Cory Bowie       [id number]     5/24/1960    PRE-OP DX:  RIGHT HIP PRIMARY OSTEOARTHRITIS  POST-OP DX:  RIGHT HIP PRIMARY OSTEOARTHRITIS  PROCEDURE:  DIRECT ANTERI RETRACTORS WERE PLACED CAREFULLY. GOOD ACETABULAR EXPOSURE WAS OBTAINED. LABRAL TISSUE WAS EXCISED. MEDIAL WALL WAS IDENTIFIED AND CLEARED OF SOFT TISSUES. ACETABULAR REAMING WAS PERFORMED IN A SEQUENTIAL FASHION BY 1-2 mm.   REAMING WAS MEDIALIZED TO IRRIGATED COPIOUSLY. HEMOSTASIS WAS OBTAINED. ACETABULUM WAS REEXPOSED. REAL LINER WAS IMPACTED. ITS SEATING WAS VERIFIED. LOCAL COCKTAIL WAS INFILTRATED CAREFULLY TO CAPSULE AND SURROUNDING SOFT TISSUE. PROXIMAL FEMUR WAS EXPOSED.   [facility]

## 2020-06-24 NOTE — ANESTHESIA PREPROCEDURE EVALUATION
PRE-OP EVALUATION    Patient Name: Pascale Denson    Pre-op Diagnosis: Primary osteoarthritis of right hip [M16.11]    Procedure(s):  RIGHT ANTERIOR TOTAL HIP REPLACEMENT    Surgeon(s) and Role:     Kermit Carvajal MD - Primary    Pre-op vitals reviewed. Teriflunomide 14 MG Oral Tab, Take 14 mg by mouth daily. , Disp: , Rfl:         Allergies: Patient has no known allergies. Anesthesia Evaluation    Patient summary reviewed.     Anesthetic Complications           GI/Hepatic/Renal      (+) GERD 15.8 (H) 06/08/2020    .0 06/08/2020     Lab Results   Component Value Date     06/08/2020    K 4.1 06/08/2020     06/08/2020    CO2 25.0 06/08/2020    BUN 19 (H) 06/08/2020    CREATSERUM 1.14 06/08/2020     (H) 06/08/2020    CA 9

## 2020-06-24 NOTE — ANESTHESIA PROCEDURE NOTES
Spinal Block  Performed by: Isabel Moran MD  Authorized by: Isabel Moran MD       General Information and Staff    Start Time:  6/24/2020 9:22 AM  End Time:  6/24/2020 9:28 AM  Anesthesiologist:  Isabel Moran MD  Performed by:   Anesthesiologist  Helen

## 2020-06-25 VITALS
BODY MASS INDEX: 25.89 KG/M2 | SYSTOLIC BLOOD PRESSURE: 139 MMHG | HEIGHT: 72 IN | HEART RATE: 57 BPM | TEMPERATURE: 99 F | OXYGEN SATURATION: 94 % | DIASTOLIC BLOOD PRESSURE: 74 MMHG | WEIGHT: 191.13 LBS | RESPIRATION RATE: 18 BRPM

## 2020-06-25 LAB
BASOPHILS # BLD AUTO: 0.02 X10(3) UL (ref 0–0.2)
BASOPHILS NFR BLD AUTO: 0.2 %
DEPRECATED RDW RBC AUTO: 50.7 FL (ref 35.1–46.3)
EOSINOPHIL # BLD AUTO: 0.02 X10(3) UL (ref 0–0.7)
EOSINOPHIL NFR BLD AUTO: 0.2 %
ERYTHROCYTE [DISTWIDTH] IN BLOOD BY AUTOMATED COUNT: 15.7 % (ref 11–15)
HCT VFR BLD AUTO: 25.6 % (ref 39–53)
HGB BLD-MCNC: 8.2 G/DL (ref 13–17.5)
IMM GRANULOCYTES # BLD AUTO: 0.03 X10(3) UL (ref 0–1)
IMM GRANULOCYTES NFR BLD: 0.3 %
LYMPHOCYTES # BLD AUTO: 1.04 X10(3) UL (ref 1–4)
LYMPHOCYTES NFR BLD AUTO: 11.1 %
MCH RBC QN AUTO: 28.4 PG (ref 26–34)
MCHC RBC AUTO-ENTMCNC: 32 G/DL (ref 31–37)
MCV RBC AUTO: 88.6 FL (ref 80–100)
MONOCYTES # BLD AUTO: 1 X10(3) UL (ref 0.1–1)
MONOCYTES NFR BLD AUTO: 10.7 %
NEUTROPHILS # BLD AUTO: 7.26 X10 (3) UL (ref 1.5–7.7)
NEUTROPHILS # BLD AUTO: 7.26 X10(3) UL (ref 1.5–7.7)
NEUTROPHILS NFR BLD AUTO: 77.5 %
PLATELET # BLD AUTO: 263 10(3)UL (ref 150–450)
RBC # BLD AUTO: 2.89 X10(6)UL (ref 4.3–5.7)
WBC # BLD AUTO: 9.4 X10(3) UL (ref 4–11)

## 2020-06-25 PROCEDURE — 99232 SBSQ HOSP IP/OBS MODERATE 35: CPT | Performed by: HOSPITALIST

## 2020-06-25 NOTE — CM/SW NOTE
06/25/20 0900   CM/SW Referral Data   Referral Source Social Work (self-referral)   Reason for Referral Discharge planning   Discharge Needs   Anticipated D/C needs Home health care       HOME SITUATION  Type of Home: House   Home Layout: Two level  [address]

## 2020-06-25 NOTE — CM/SW NOTE
06/25/20 0900   Discharge disposition   Expected discharge disposition Home-Health   Name of Facillity/Home Care/Hospice Residential   Home services after discharge Skilled home care   Discharge transportation Private car

## 2020-06-25 NOTE — HOME CARE LIAISON
MET WITH PTNT AND OFFERED CHOICE  OF AGENCIES. PTNT AGREEABLE TO [facility]. MET WITH PTNT TO DISCUSS HOME HEALTH SERVICES AND COVERAGE CRITERIA. PTNT AGREEABLE TO Jonatan Herrera. PTNT GIVEN RESIDENTIAL BROCHURE.  RESIDENTIAL WITH PROVIDE PT ON DISCHAR

## 2020-06-25 NOTE — PROGRESS NOTES
Orthopedic surgery progress note    Armani Holder Patient Status:  Inpatient    1960 MRN [id number]   [facility] 3SW-A Attending Barbi Antony MD   Hosp Day # 1 PCP Elisa Camarena MD       Subjective:  No major complaints.   No calf p

## 2020-06-25 NOTE — PROGRESS NOTES
NURSING DISCHARGE NOTE    Discharged Home via Wheelchair. Accompanied by Spouse  Belongings Taken by patient/family. Discharge instructions discussed with patient and spouse, both verbalized understanding.   Eliquis and Norco script called by Dr. Debra Starkey

## 2020-06-25 NOTE — OCCUPATIONAL THERAPY NOTE
OCCUPATIONAL THERAPY QUICK EVALUATION - INPATIENT    Room Number: 174/495-F  Evaluation Date: 6/25/2020     Type of Evaluation: Quick Eval  Presenting Problem: s/p R JANINE 6/24/20    Physician Order: IP Consult to Occupational Therapy  Reason for Therapy:  A anastomosis   • OTHER SURGICAL HISTORY  03/08/2019    Repair of incarcerated incisional hernia   • TONSILLECTOMY     • VASECTOMY  1990       OCCUPATIONAL PROFILE    HOME SITUATION  Type of Home: House  Home Layout: Two level  Lives With: Spouse    Toilet a teeth?: None  -   Eating meals?: None    AM-PAC Score:  Score: 21  Approx Degree of Impairment: 32.79%  Standardized Score (AM-PAC Scale): 44.27  CMS Modifier (G-Code): CJ    FUNCTIONAL TRANSFER ASSESSMENT  Supine to Sit : Modified independent  Sit to  Company no further questions or concerns about safe return to I/ADL tasks. No further OT services needed at this time.      Patient Complexity  Occupational Profile/Medical History  LOW - Brief history including review of medical or therapy records    Specific perf

## 2020-06-25 NOTE — PROGRESS NOTES
Care partner (wife) at bedside. Reviewed prevention of constipation side effect  from narcotics. Teachback done again on ankle pumps and incentive spriometry use. Patient states they already watched discharge education video.  Reviewed dressing changes, sh

## 2020-06-25 NOTE — PLAN OF CARE
Patient verbalized felt much better this morning. He reported pain level of 1 to right surgical hip even with mobility. Patient has noelle chronic numbness to right foot, denies new numbness or tingling sensation to BLE.   Right anterior hip with Aquacel kay

## 2020-06-25 NOTE — PROGRESS NOTES
[facility]PITAL[facility]  Progress Note     Dereje Mack Patient Status:  Inpatient    1960 MRN [id number]   [facility] 3SW-A Attending Ileana Richards MD   Hosp Day # 1 PCP Kristopher Reyes MD     Chief Complaint: med mgmt    S: Patient fees [email or web address]   • Teriflunomide  14 mg Oral Daily   • cholecalciferol  2,000 Units Oral Daily   • folic acid  1 mg Oral Daily   • fenofibrate micronized  134 mg Oral Daily with breakfast   • atorvastatin  20 mg Oral Nightly       ASSESSMENT / PLAN:     1.

## 2020-06-25 NOTE — PLAN OF CARE
Patient alert and oriented ,pain is mild and well controlled ,vital signs stable ,denies any chest pain or short of breath ,using cpap at night ,dressing to right hip clean and dry ,ice and abd pillow in place ,gets up with min assist ,encouraged use of in

## 2020-06-25 NOTE — RESPIRATORY THERAPY NOTE
[place] : EQUIPMENT USE: DAILY SUMMARY                                            SET MODE: CPAP WITH CFLEX                                          USAGE IN HOURS:9:08                                          90% EXP.

## 2020-06-25 NOTE — PHYSICAL THERAPY NOTE
PHYSICAL THERAPY HIP TREATMENT NOTE - INPATIENT      Room Number: 465/553-Z     Session: 1  Number of Visits to Meet Established Goals: 3    Presenting Problem: s/p anterior R JANINE 6/24/20    Problem List  Active Problems:    Benign essential HTN    Primary extremity        R Lower Extremity: Weight Bearing as Tolerated       PAIN ASSESSMENT   Ratin  Location: R hip  Management Techniques: Activity promotion; Body mechanics;Breathing techniques;Repositioning    BALANCE  Static Sitting: Good  Dynamic Sittin into home. Pt performed stair training c B HR and CGa, cues for sequencing. Pt gait trained back to room, c RW and supervision. Pt performed seated and standing therex per JANINE protocol. RW vended for home use.  Pt left in chair , needs met, all pt and negotiate 4 stairs/one curb w/ assistive device and supervision   Goal #5     Patient verbalizes and/or demonstrates all precautions and safety concerns independently   Goal #6           Goal Comments: Goals established on 6/24/2020  Adequate for d/c

## 2020-06-26 ENCOUNTER — TELEPHONE (OUTPATIENT)
Dept: INTERNAL MEDICINE CLINIC | Facility: CLINIC | Age: 60
End: 2020-06-26

## 2020-06-26 ENCOUNTER — PATIENT OUTREACH (OUTPATIENT)
Dept: CASE MANAGEMENT | Age: 60
End: 2020-06-26

## 2020-06-26 DIAGNOSIS — M16.11 PRIMARY OSTEOARTHRITIS OF RIGHT HIP: ICD-10-CM

## 2020-06-26 DIAGNOSIS — Z02.9 ENCOUNTERS FOR UNSPECIFIED ADMINISTRATIVE PURPOSE: ICD-10-CM

## 2020-06-26 PROCEDURE — 1111F DSCHRG MED/CURRENT MED MERGE: CPT

## 2020-06-26 NOTE — TELEPHONE ENCOUNTER
Spoke to pt for TCM today to follow up on recent hospitalization and right total hip replacement. Pt does not have HFU appt scheduled at this time. TCM/HFU appt recommended by 7/9/2020 as pt is a moderate risk for readmission. Please advise.     BOOK BY

## 2020-06-26 NOTE — PROGRESS NOTES
Initial Post Discharge Follow Up   Discharge Date: 6/25/20  Contact Date: 6/26/2020    Consent Verification:  Assessment Completed With: Patient  HIPAA Verified?   Yes    Discharge Dx:     OA s/p R THR  Chronic Anemia  MS  HTN  DL  Pre-DM    Was TCC orde dizziness, pallor, or leg cramps. The patient has not yet had a home BP cuff delivered to the home (this has been ordered and due for delivery tomorrow) but the patient does plan to have vitals checked with the [facility] RN today.  The patient denies any headache, acetaminophen 500 MG Oral Tab Take 1 tablet (500 mg total) by mouth 4 (four) times daily. 120 tablet 0   • celecoxib (CELEBREX) 200 MG Oral Cap Take 1 capsule (200 mg total) by mouth daily.  30 capsule 0   • traMADol HCl 50 MG Oral Tab Take 1 tablet (50 mg hospital? Yes  • May I go over your medications with you to make sure we are not missing anything? yes  • Are there any reasons that keep you from taking your medication as prescribed? No  Are you having any concerns with constipation?  No    Referrals/order process?   yes    GENERAL:   Were you able to participate in any of the following educational opportunities:   Pre op class in person no   Pre op class online no   Discharge class in person no   Discharge video  yes        Needs post D/C:   Now that you are Jackeline)        Aug 13, 2020 11:30 AM CDT PT VISIT BY THERAPIST with Steve Landeros PT [facility] in [place] ([address] [place] [address])        Aug 18, 2020 11:30 AM CDT PT VISIT BY THERAPIST with HELENA Ndiaye Se discharge instructions with the patient with a focus on fall precautions, BP management, post-op directions. All medications were reviewed and educated on the importance of taking the medications as directed.   Patient symptoms were assessed, education was

## 2020-06-30 ENCOUNTER — OFFICE VISIT (OUTPATIENT)
Dept: INTERNAL MEDICINE CLINIC | Facility: CLINIC | Age: 60
End: 2020-06-30
Payer: COMMERCIAL

## 2020-06-30 VITALS
SYSTOLIC BLOOD PRESSURE: 124 MMHG | WEIGHT: 192.19 LBS | DIASTOLIC BLOOD PRESSURE: 70 MMHG | HEART RATE: 98 BPM | TEMPERATURE: 97 F | BODY MASS INDEX: 26.03 KG/M2 | RESPIRATION RATE: 14 BRPM | OXYGEN SATURATION: 99 % | HEIGHT: 72 IN

## 2020-06-30 DIAGNOSIS — M16.11 PRIMARY OSTEOARTHRITIS OF RIGHT HIP: ICD-10-CM

## 2020-06-30 DIAGNOSIS — Z96.641 STATUS POST RIGHT HIP REPLACEMENT: Primary | ICD-10-CM

## 2020-06-30 DIAGNOSIS — R58 ECCHYMOSIS: ICD-10-CM

## 2020-06-30 DIAGNOSIS — Z98.890 POST-OPERATIVE STATE: ICD-10-CM

## 2020-06-30 DIAGNOSIS — I10 ESSENTIAL HYPERTENSION: ICD-10-CM

## 2020-06-30 DIAGNOSIS — D62 ACUTE BLOOD LOSS AS CAUSE OF POSTOPERATIVE ANEMIA: ICD-10-CM

## 2020-06-30 PROCEDURE — 99495 TRANSJ CARE MGMT MOD F2F 14D: CPT | Performed by: INTERNAL MEDICINE

## 2020-06-30 PROCEDURE — 1111F DSCHRG MED/CURRENT MED MERGE: CPT | Performed by: INTERNAL MEDICINE

## 2020-06-30 NOTE — PROGRESS NOTES
HPI:    Javier Marie is a 63year old male here today for hospital follow up.    He was discharged from Inpatient hospital[facility] to Home   Admission Date: 6/24/20   Discharge Date: 6/25/20  Hospital Discharge Diagnoses (since 5/31/2020)   Non knee, but not painful or tense. PT told him it was normal.  Walking with walker. Has appt with Dr Delle Aschoff on 9th. Taking eliquis 2.5mg BID for VTE ppx. Has bandage on from post surgery, for the last wk, some blood on bandage but unchanged since discharge.  The Pain (severe pain). No more than 10 tabs daily. No driving or use of heavy machinery. (Patient not taking: Reported on 6/30/2020 )  Vardenafil HCl (LEVITRA) 20 MG Oral Tab, Take 1 tablet (20 mg total) by mouth daily as needed for Erectile Dysfunction.  (Pa exertion  CARDIOVASCULAR: denies chest pain on exertion or palpitations  GI: denies abdominal pain, denies heartburn, denies diarrhea  MUSCULOSKELETAL: denies pain, LROM of RLE  NEURO: denies headaches, denies dizziness, denies weakness  PSYCHE: denies dep Working with PT, using walking aids to get around. No signs of infection. Minimal bruising, first noticed on discharge day, no change since then. Taking Eliquis w/o any other side effects. Has f/u appt scheduled with surgeon.      Reports that he experience

## 2020-07-14 ENCOUNTER — OFFICE VISIT (OUTPATIENT)
Dept: PHYSICAL THERAPY | Age: 60
End: 2020-07-14
Attending: ORTHOPAEDIC SURGERY
Payer: COMMERCIAL

## 2020-07-14 DIAGNOSIS — Z96.641 STATUS POST TOTAL HIP REPLACEMENT, RIGHT: ICD-10-CM

## 2020-07-14 PROCEDURE — 97110 THERAPEUTIC EXERCISES: CPT

## 2020-07-14 PROCEDURE — 97161 PT EVAL LOW COMPLEX 20 MIN: CPT

## 2020-07-14 NOTE — PROGRESS NOTES
POST-OP HIP EVALUATION:   Referring Physician: Dr. Adarsh Mclaughlin  Diagnosis:  Post R [address] 6/24/20   Date of Service: 7/14/2020     PATIENT SUMMARY   Andrea Mckeon is a 63year old male who presents to therapy today s/p R JANINE with anterior approach on 6/24/20.  [facility] is medically necessary to address the above impairments and reach functional goals.      Precautions:  None  Anterior hip precautions  OBJECTIVE:   Observation/Skin: scar healing, 2 steri strips still in place  Palpation:  + pirifor shoes and perform car transfers without difficulty  · Pt will improve hip ABD and ER strength to 5/5 to increase ease with standing and walking   · Pt will demonstrate improved SLS to >30 seconds CHAPIS to promote safety and decrease risk of falls on uneven s

## 2020-07-16 ENCOUNTER — OFFICE VISIT (OUTPATIENT)
Dept: PHYSICAL THERAPY | Age: 60
End: 2020-07-16
Attending: ORTHOPAEDIC SURGERY
Payer: COMMERCIAL

## 2020-07-16 PROCEDURE — 97110 THERAPEUTIC EXERCISES: CPT

## 2020-07-16 NOTE — PROGRESS NOTES
Dx:  Post op R JANINE       Insurance (Authorized # of Visits):  15           Authorizing Physician: Dr. Colby ref. provider found  Next MD visit: none scheduled  Fall Risk: standard         Precautions: anterior  approach             Subjective: feeling better. 10 x   Bridge 10 x   Stephen stretch off the front of the plinth 3 x 30 secs R           CP at home       HEP: cont with HEP issued and clams/abd and bridges.      Charges: EX 3       Total Timed Treatment: 45 min  Total Treatment Time: 45 min

## 2020-07-21 ENCOUNTER — OFFICE VISIT (OUTPATIENT)
Dept: PHYSICAL THERAPY | Age: 60
End: 2020-07-21
Attending: ORTHOPAEDIC SURGERY
Payer: COMMERCIAL

## 2020-07-21 PROCEDURE — 97110 THERAPEUTIC EXERCISES: CPT

## 2020-07-21 NOTE — PROGRESS NOTES
Dx:  Post op R JANINE       Insurance (Authorized # of Visits):  15           Authorizing Physician: Dr. Colby ref. provider found  Next MD visit: none scheduled  Fall Risk: standard         Precautions: anterior  approach             Subjective: feeling better. 10 x 2   Step down with L 10 x  Flex red band 10 x   Each with HHA  Stephen stretch off front to avoid hip ext 3 x 30 secs    PROM R hip flex 10 x   Clams no band 10 x   RTB 10 x       Mini squats 10 x in bars  Gait training 5 mins  Lateral side walking no

## 2020-07-23 ENCOUNTER — OFFICE VISIT (OUTPATIENT)
Dept: PHYSICAL THERAPY | Age: 60
End: 2020-07-23
Attending: ORTHOPAEDIC SURGERY
Payer: COMMERCIAL

## 2020-07-23 PROCEDURE — 97110 THERAPEUTIC EXERCISES: CPT

## 2020-07-23 NOTE — PROGRESS NOTES
Dx:  Post op R AJNINE       Insurance (Authorized # of Visits):  15           Authorizing Physician: Dr. Colby ref.  provider found  Next MD visit: none scheduled  Fall Risk: standard         Precautions: anterior  approach             Subjective: no soreness tod TX#: 3/12 Date:   7/23/20              TX#: 4/12 Date:                 TX#: 5/ Date:    Tx#: 6/   Rocker board A/P 10 x2   nustep 5 mins    Bike seated 5 mins LE only     Reformer 4 -5 cords 10 x 2 B   Step ups 6 inch B 10 x    Upright bike 6 mins   Step

## 2020-07-28 ENCOUNTER — OFFICE VISIT (OUTPATIENT)
Dept: PHYSICAL THERAPY | Age: 60
End: 2020-07-28
Attending: ORTHOPAEDIC SURGERY
Payer: COMMERCIAL

## 2020-07-28 PROCEDURE — 97110 THERAPEUTIC EXERCISES: CPT

## 2020-07-28 NOTE — PROGRESS NOTES
Dx:  Post op R JANINE       Insurance (Authorized # of Visits):  15           Authorizing Physician: Dr. Colby ref. provider found  Next MD visit: aug 7th Friday   Fall Risk: standard         Precautions: anterior  approach             Subjective:  Sleeping ok. nustep 5 mins    Bike seated 5 mins LE only     Reformer 4 -5 cords 10 x 2 B   Step ups 6 inch B 10 x    Upright bike 6 mins   Step ups 6 inch 10 x   SL on with ball rolling 10  X each lateral and side  R 10 x rolling ball   Step down 10  X    Lateral si

## 2020-07-30 ENCOUNTER — OFFICE VISIT (OUTPATIENT)
Dept: PHYSICAL THERAPY | Age: 60
End: 2020-07-30
Attending: ORTHOPAEDIC SURGERY
Payer: COMMERCIAL

## 2020-07-30 PROCEDURE — 97110 THERAPEUTIC EXERCISES: CPT

## 2020-07-30 NOTE — PROGRESS NOTES
Dx:  Post op R JANINE       Insurance (Authorized # of Visits):  15           Authorizing Physician: Dr. Colby ref.  provider found  Next MD visit: aug 7th Friday   Fall Risk: standard         Precautions: anterior  approach             Subjective:  I feel really PT for balance and gait training. Bridge with hip abd next appt.    Date: 7/16/2020  TX#: 2/ 12 Date:   7/21/20              TX#: 3/12 Date:   7/23/20              TX#: 4/12 Date:    7/28/20          TX#: 5/ Date: 7/30/20  Tx#: 6/10   [facility] board A/P 10 x2 red band no HHA - cues to avoid shifting  Marching in supine RTB 10x on each side       CP at home       HEP: cont with HEP issued and clams/abd and bridges.      Charges: EX 3       Total Timed Treatment: 45 min  Total Treatment Time: 45 min

## 2020-08-03 ENCOUNTER — OFFICE VISIT (OUTPATIENT)
Dept: PHYSICAL THERAPY | Age: 60
End: 2020-08-03
Attending: ORTHOPAEDIC SURGERY
Payer: COMMERCIAL

## 2020-08-03 PROCEDURE — 97110 THERAPEUTIC EXERCISES: CPT

## 2020-08-03 NOTE — PROGRESS NOTES
Dx:  Post op R JANINE       Insurance (Authorized # of Visits):  15           Authorizing Physician: Dr. Ana Zimmer  Next MD visit: aug 7th Friday   Fall Risk: standard         Precautions: anterior  approach             Subjective:  Pt would like to be able to bike training.   Date: 7/16/2020  TX#: 2/ 12 Date:   7/21/20              TX#: 3/12 Date:   7/23/20              TX#: 4/12 Date:    7/28/20          TX#: 5/ Date: 7/30/20  Tx#: 6/10 Date: 8/3/2020  Tx#: 7/10   Rocker board A/P 10 x2   nustep 5 mins    Bike seate hip flex 10 x   Clams no band 10 x   RTB 10 x        Mini squats 10 x in bars  Gait training 5 mins  Lateral side walking no band   Clams / abd no band 10 x   Bridge 10 x   Stephen stretch off the front of the plinth 3 x 30 secs R     HS stretch 3 x 30 secs

## 2020-08-06 ENCOUNTER — OFFICE VISIT (OUTPATIENT)
Dept: PHYSICAL THERAPY | Age: 60
End: 2020-08-06
Attending: ORTHOPAEDIC SURGERY
Payer: COMMERCIAL

## 2020-08-06 PROCEDURE — 97110 THERAPEUTIC EXERCISES: CPT

## 2020-08-06 NOTE — PROGRESS NOTES
Dx:  Post op R JANINE       Insurance (Authorized # of Visits):  15           Authorizing Physician: Dr. Pepper Vanegas  Next MD visit: aug 7th Friday   Fall Risk: standard         Precautions: anterior  approach             Subjective:  Pt went to Dr. Pepper Vanegas, everything i and compliant with comprehensive HEP to maintain progress achieved in PT  ·   Plan: cont with PT for balance and gait training.   Date:   7/23/20              TX#: 4/12 Date:    7/28/20          TX#: 5/ Date: 7/30/20  Tx#: 6/10 Date: 8/3/2020  Tx#: 7/10 Alexsander advantaged) 3 x 10  SLS [place] Tap onto 6\" step 2 x 10 ea  Standing Hip ABD Iso into wall 5\" x 10                        HEP: cont with HEP issued and clams/abd and bridges.      Charges: EX 3       Total Timed Treatment: 45 min  Total Treatment Time: 45 mi

## 2020-08-11 ENCOUNTER — APPOINTMENT (OUTPATIENT)
Dept: PHYSICAL THERAPY | Age: 60
End: 2020-08-11
Attending: ORTHOPAEDIC SURGERY
Payer: COMMERCIAL

## 2020-08-13 ENCOUNTER — APPOINTMENT (OUTPATIENT)
Dept: PHYSICAL THERAPY | Age: 60
End: 2020-08-13
Attending: ORTHOPAEDIC SURGERY
Payer: COMMERCIAL

## 2020-08-17 ENCOUNTER — TELEPHONE (OUTPATIENT)
Dept: INTERNAL MEDICINE CLINIC | Facility: CLINIC | Age: 60
End: 2020-08-17

## 2020-08-17 ENCOUNTER — PATIENT OUTREACH (OUTPATIENT)
Dept: INTERNAL MEDICINE CLINIC | Facility: CLINIC | Age: 60
End: 2020-08-17

## 2020-08-17 NOTE — TELEPHONE ENCOUNTER
Faxed back to Fidel Mohamud RN [facility] at [phone]  Confirmed fax sent.   To scanning

## 2020-08-17 NOTE — TELEPHONE ENCOUNTER
Received care gap report from [facility]. Reminder sent to pt regarding DM eye exam. Looks like also still due for cscope (every 3 years. ). See remainder of report.  Routed to Dr Porfirio Weiner

## 2020-08-18 ENCOUNTER — OFFICE VISIT (OUTPATIENT)
Dept: PHYSICAL THERAPY | Age: 60
End: 2020-08-18
Attending: ORTHOPAEDIC SURGERY
Payer: COMMERCIAL

## 2020-08-18 PROCEDURE — 97110 THERAPEUTIC EXERCISES: CPT

## 2020-08-18 NOTE — PROGRESS NOTES
Dx:  Post op R JANINE       Insurance (Authorized # of Visits):  15           Authorizing Physician: Dr. Colby ref.  provider found  Next MD visit: aug 7th Friday   Fall Risk: standard         Precautions: anterior  approach             Subjective:   Drove about Cont with balance on the R LE next appt.    Date:   7/23/20              TX#: 4/12 Date:    7/28/20          TX#: 5/ Date: 7/30/20  Tx#: 6/10 Date: 8/3/2020  Tx#: 7/10 Date: 8/6/2020  Tx#: 8/10 8/18/20  9/10   Upright bike 6 mins   Step ups 6 inch 10 x   SL into wall 5\" x 10 Upright 6 mins     Reformer 4 cords 10 x 2 B    SL squats 10 x 2 cords R only     Marching 10 x 2  6 inch 10 x 2 in bars  SL balance 20 secs  1/2 tandem balance on foam 10 x each side    Bridge 10 x 2  Bridge 10 x 2 ball add   Clams red

## 2020-08-20 ENCOUNTER — OFFICE VISIT (OUTPATIENT)
Dept: PHYSICAL THERAPY | Age: 60
End: 2020-08-20
Attending: ORTHOPAEDIC SURGERY
Payer: COMMERCIAL

## 2020-08-20 PROCEDURE — 97110 THERAPEUTIC EXERCISES: CPT

## 2020-08-20 NOTE — PROGRESS NOTES
Dx:  Post op R JANINE       Insurance (Authorized # of Visits):  15           Authorizing Physician: Dr. Colby ref.  provider found  Next MD visit: aug 7th Friday   Fall Risk: standard         Precautions: anterior  approach             Subjective:  I am feeling 10/12  10/12   Upright bike 6 mins   Step ups 6 inch 10 x   SL on with ball rolling 10  X each lateral and side  R 10 x rolling ball   Step down 10  X    Lateral side ways GTB  Attempted GTb for hip flex - back to red 10 x flex and 10 ext     Supine R only each side    Bridge 10 x 2  Bridge 10 x 2 ball add   Clams red band in side lying 10 x  2  Abd no band 10 x2   PROM R hip all planes  HS stretch 3 x 30 secs       Upright bike 5 mins   Rocker board A/P 10 x B and  SL only 10 each   Step ups 10 x 6 inch  8

## 2020-08-21 ENCOUNTER — OFFICE VISIT (OUTPATIENT)
Dept: INTERNAL MEDICINE CLINIC | Facility: CLINIC | Age: 60
End: 2020-08-21
Payer: COMMERCIAL

## 2020-08-21 VITALS
OXYGEN SATURATION: 97 % | HEART RATE: 79 BPM | WEIGHT: 190.63 LBS | DIASTOLIC BLOOD PRESSURE: 72 MMHG | HEIGHT: 72 IN | BODY MASS INDEX: 25.82 KG/M2 | RESPIRATION RATE: 14 BRPM | TEMPERATURE: 98 F | SYSTOLIC BLOOD PRESSURE: 126 MMHG

## 2020-08-21 DIAGNOSIS — E11.9 TYPE 2 DIABETES MELLITUS WITHOUT COMPLICATION, WITHOUT LONG-TERM CURRENT USE OF INSULIN (HCC): Primary | ICD-10-CM

## 2020-08-21 DIAGNOSIS — Z12.5 SCREENING FOR PROSTATE CANCER: ICD-10-CM

## 2020-08-21 DIAGNOSIS — D62 ACUTE BLOOD LOSS AS CAUSE OF POSTOPERATIVE ANEMIA: ICD-10-CM

## 2020-08-21 DIAGNOSIS — I10 ESSENTIAL HYPERTENSION: ICD-10-CM

## 2020-08-21 DIAGNOSIS — Z13.29 SCREENING FOR THYROID DISORDER: ICD-10-CM

## 2020-08-21 PROCEDURE — 3008F BODY MASS INDEX DOCD: CPT | Performed by: INTERNAL MEDICINE

## 2020-08-21 PROCEDURE — 99214 OFFICE O/P EST MOD 30 MIN: CPT | Performed by: INTERNAL MEDICINE

## 2020-08-21 PROCEDURE — 3078F DIAST BP <80 MM HG: CPT | Performed by: INTERNAL MEDICINE

## 2020-08-21 PROCEDURE — 3074F SYST BP LT 130 MM HG: CPT | Performed by: INTERNAL MEDICINE

## 2020-08-21 RX ORDER — AMLODIPINE BESYLATE 5 MG/1
5 TABLET ORAL DAILY
Qty: 90 TABLET | Refills: 1 | Status: SHIPPED | OUTPATIENT
Start: 2020-08-21 | End: 2021-07-08

## 2020-08-21 NOTE — PROGRESS NOTES
Established Patient Progress Note  Chief Complaint:   Patient presents with:   Follow - Up: HTN and DM Check      HPI:   This is a 63year old male with PMH [place] on CPAP, DM2 (diet controlled), MS (follows w/Dr. Alyse Aviles), HTN, Carcinoid tumor s/p resect Feb Take 1 g by mouth daily. • Oxybutynin Chloride 5 MG Oral Tab Take 10 mg by mouth as needed. • Psyllium (METAMUCIL FIBER OR) Take by mouth daily.      • Vardenafil HCl (LEVITRA) 20 MG Oral Tab Take 1 tablet (20 mg total) by mouth daily as needed for including HbA1c and Microalb. Discussed diabetic diet. Patient aware of need for diabetic eye exam and will reach out to his Optho. HTN, poorly controlled. Systolic above goal on home readings. Will re-start Norvasc at 5mg daily.  Patient to keep logs o unspecified type     Chest pain with moderate risk for cardiac etiology     Type 2 diabetes mellitus without complication, without long-term current use of insulin (HCC)     Carcinoid tumor of cecum     Hyperlipidemia     Acute blood loss as cause of posto

## 2020-08-21 NOTE — PATIENT INSTRUCTIONS
Get blood work done. Schedule appt for blood work.     Follow-up with your gastroenterologist for repeat colonoscopy which is due    Follow-up with your dermatologist for annual skin cancer screening    Follow-up with your eye doctor for diabetic eye exam

## 2020-08-25 ENCOUNTER — LAB ENCOUNTER (OUTPATIENT)
Dept: LAB | Age: 60
End: 2020-08-25
Attending: INTERNAL MEDICINE
Payer: COMMERCIAL

## 2020-08-25 ENCOUNTER — OFFICE VISIT (OUTPATIENT)
Dept: PHYSICAL THERAPY | Age: 60
End: 2020-08-25
Attending: ORTHOPAEDIC SURGERY
Payer: COMMERCIAL

## 2020-08-25 DIAGNOSIS — Z13.29 SCREENING FOR THYROID DISORDER: ICD-10-CM

## 2020-08-25 DIAGNOSIS — D64.9 ANEMIA, UNSPECIFIED TYPE: ICD-10-CM

## 2020-08-25 DIAGNOSIS — Z12.5 SCREENING FOR PROSTATE CANCER: ICD-10-CM

## 2020-08-25 DIAGNOSIS — I10 ESSENTIAL HYPERTENSION: ICD-10-CM

## 2020-08-25 DIAGNOSIS — E11.9 TYPE 2 DIABETES MELLITUS WITHOUT COMPLICATION, WITHOUT LONG-TERM CURRENT USE OF INSULIN (HCC): ICD-10-CM

## 2020-08-25 DIAGNOSIS — D62 ACUTE BLOOD LOSS AS CAUSE OF POSTOPERATIVE ANEMIA: ICD-10-CM

## 2020-08-25 LAB
ALBUMIN SERPL-MCNC: 3.8 G/DL (ref 3.4–5)
ALBUMIN/GLOB SERPL: 1 {RATIO} (ref 1–2)
ALP LIVER SERPL-CCNC: 53 U/L (ref 45–117)
ALT SERPL-CCNC: 24 U/L (ref 16–61)
ANION GAP SERPL CALC-SCNC: 5 MMOL/L (ref 0–18)
AST SERPL-CCNC: 36 U/L (ref 15–37)
BASOPHILS # BLD AUTO: 0.04 X10(3) UL (ref 0–0.2)
BASOPHILS NFR BLD AUTO: 1 %
BILIRUB SERPL-MCNC: 0.6 MG/DL (ref 0.1–2)
BUN BLD-MCNC: 18 MG/DL (ref 7–18)
BUN/CREAT SERPL: 16.5 (ref 10–20)
CALCIUM BLD-MCNC: 9.7 MG/DL (ref 8.5–10.1)
CHLORIDE SERPL-SCNC: 106 MMOL/L (ref 98–112)
CO2 SERPL-SCNC: 27 MMOL/L (ref 21–32)
COMPLEXED PSA SERPL-MCNC: 2.24 NG/ML (ref ?–4)
CREAT BLD-MCNC: 1.09 MG/DL (ref 0.7–1.3)
CREAT UR-SCNC: 148 MG/DL
DEPRECATED RDW RBC AUTO: 50.7 FL (ref 35.1–46.3)
EOSINOPHIL # BLD AUTO: 0.26 X10(3) UL (ref 0–0.7)
EOSINOPHIL NFR BLD AUTO: 6.7 %
ERYTHROCYTE [DISTWIDTH] IN BLOOD BY AUTOMATED COUNT: 15.4 % (ref 11–15)
EST. AVERAGE GLUCOSE BLD GHB EST-MCNC: 105 MG/DL (ref 68–126)
GLOBULIN PLAS-MCNC: 3.8 G/DL (ref 2.8–4.4)
GLUCOSE BLD-MCNC: 113 MG/DL (ref 70–99)
HBA1C MFR BLD HPLC: 5.3 % (ref ?–5.7)
HCT VFR BLD AUTO: 36.3 % (ref 39–53)
HGB BLD-MCNC: 11.2 G/DL (ref 13–17.5)
IMM GRANULOCYTES # BLD AUTO: 0.03 X10(3) UL (ref 0–1)
IMM GRANULOCYTES NFR BLD: 0.8 %
LYMPHOCYTES # BLD AUTO: 1.41 X10(3) UL (ref 1–4)
LYMPHOCYTES NFR BLD AUTO: 36.2 %
M PROTEIN MFR SERPL ELPH: 7.6 G/DL (ref 6.4–8.2)
MCH RBC QN AUTO: 28 PG (ref 26–34)
MCHC RBC AUTO-ENTMCNC: 30.9 G/DL (ref 31–37)
MCV RBC AUTO: 90.8 FL (ref 80–100)
MICROALBUMIN UR-MCNC: 1.73 MG/DL
MICROALBUMIN/CREAT 24H UR-RTO: 11.7 UG/MG (ref ?–30)
MONOCYTES # BLD AUTO: 0.58 X10(3) UL (ref 0.1–1)
MONOCYTES NFR BLD AUTO: 14.9 %
NEUTROPHILS # BLD AUTO: 1.57 X10 (3) UL (ref 1.5–7.7)
NEUTROPHILS # BLD AUTO: 1.57 X10(3) UL (ref 1.5–7.7)
NEUTROPHILS NFR BLD AUTO: 40.4 %
OSMOLALITY SERPL CALC.SUM OF ELEC: 289 MOSM/KG (ref 275–295)
PATIENT FASTING Y/N/NP: YES
PLATELET # BLD AUTO: 344 10(3)UL (ref 150–450)
POTASSIUM SERPL-SCNC: 4.6 MMOL/L (ref 3.5–5.1)
RBC # BLD AUTO: 4 X10(6)UL (ref 4.3–5.7)
SODIUM SERPL-SCNC: 138 MMOL/L (ref 136–145)
TSI SER-ACNC: 0.96 MIU/ML (ref 0.36–3.74)
WBC # BLD AUTO: 3.9 X10(3) UL (ref 4–11)

## 2020-08-25 PROCEDURE — 97110 THERAPEUTIC EXERCISES: CPT

## 2020-08-25 PROCEDURE — 80053 COMPREHEN METABOLIC PANEL: CPT | Performed by: INTERNAL MEDICINE

## 2020-08-25 PROCEDURE — 36415 COLL VENOUS BLD VENIPUNCTURE: CPT | Performed by: INTERNAL MEDICINE

## 2020-08-25 PROCEDURE — 84443 ASSAY THYROID STIM HORMONE: CPT | Performed by: INTERNAL MEDICINE

## 2020-08-25 PROCEDURE — 82570 ASSAY OF URINE CREATININE: CPT | Performed by: INTERNAL MEDICINE

## 2020-08-25 PROCEDURE — G0103 PSA SCREENING: HCPCS | Performed by: INTERNAL MEDICINE

## 2020-08-25 PROCEDURE — 83036 HEMOGLOBIN GLYCOSYLATED A1C: CPT | Performed by: INTERNAL MEDICINE

## 2020-08-25 PROCEDURE — 82043 UR ALBUMIN QUANTITATIVE: CPT | Performed by: INTERNAL MEDICINE

## 2020-08-25 NOTE — PROGRESS NOTES
Dx:  Post op R JANINE       Insurance (Authorized # of Visits):  15           Authorizing Physician: Dr. Colby ref.  provider found  Next MD visit: aug 7th Friday   Fall Risk: standard         Precautions: anterior  approach             Subjective:  I am feeling 8/3/2020  Tx#: 7/10 Date: 8/6/2020  Tx#: 8/10 8/18/20  9/12 10/12  10/12 11/12  8/25/20   Upright bike 6 mins   Step ups 6 inch 10 x   SL on with ball rolling 10  X each lateral and side  R 10 x rolling ball   Step down 10  X    Lateral side ways GTB  Atte inch 10 x 2 in bars  SL balance 20 secs  1/2 tandem balance on foam 10 x each side    Bridge 10 x 2  Bridge 10 x 2 ball add   Clams red band in side lying 10 x  2  Abd no band 10 x2   PROM R hip all planes  HS stretch 3 x 30 secs       Upright bike 5 mins AROM Flex to 120 deg and ABD to 50 deg to be able to don/doff shoes and perform car transfers without difficulty  · Pt will improve hip ABD and ER strength to 5/5 to increase ease with standing and walking   · Pt will demonstrate improved SLS to >30 second only     Step ups 6 inch 10 x each side    SL balance 10 secs x 4 on the R    Foam standing with feet together - EC 15 secs x 4 sets  No HHA  Step over cones in bars laterally 4 x     PROM R hip   Bridge 10 x with hip add ( ball)  Clams no band R 10 x 2

## 2020-08-27 ENCOUNTER — OFFICE VISIT (OUTPATIENT)
Dept: PHYSICAL THERAPY | Age: 60
End: 2020-08-27
Attending: ORTHOPAEDIC SURGERY
Payer: COMMERCIAL

## 2020-08-27 PROCEDURE — 97110 THERAPEUTIC EXERCISES: CPT

## 2020-08-27 NOTE — PROGRESS NOTES
Dx:  Post op R JANINE       Insurance (Authorized # of Visits):  15           Authorizing Physician: Dr. Colby ref.  provider found  Next MD visit: aug 7th Friday   Fall Risk: standard         Precautions: anterior  approach            Progress Summary  Pt has at to HEP   Date:   7/23/20              TX#: 4/12 Date:    7/28/20          TX#: 5/ Date: 7/30/20  Tx#: 6/10 Date: 8/3/2020  Tx#: 7/10 Date: 8/6/2020  Tx#: 8/10 8/18/20  9/12 10/12  10/12 11/12  8/25/20 12/12  8/27/20   Upright bike 6 mins   Step ups 6 inch ABD Iso into wall 5\" x 10 Upright 6 mins     Reformer 4 cords 10 x 2 B    SL squats 10 x 2 cords R only     Marching 10 x 2  6 inch 10 x 2 in bars  SL balance 20 secs  1/2 tandem balance on foam 10 x each side    Bridge 10 x 2  Bridge 10 x 2 ball add   Cl by therapist: Nisha Mcpherson, PT     Physician's certification required:  Yes  Please co-sign or sign and return this letter via fax as soon as possible to [phone].    I certify the need for these services furnished under this plan of treatment and w

## 2020-08-31 ENCOUNTER — MED REC SCAN ONLY (OUTPATIENT)
Dept: INTERNAL MEDICINE CLINIC | Facility: CLINIC | Age: 60
End: 2020-08-31

## 2020-09-01 ENCOUNTER — OFFICE VISIT (OUTPATIENT)
Dept: PHYSICAL THERAPY | Age: 60
End: 2020-09-01
Attending: ORTHOPAEDIC SURGERY
Payer: COMMERCIAL

## 2020-09-01 ENCOUNTER — TELEPHONE (OUTPATIENT)
Dept: INTERNAL MEDICINE CLINIC | Facility: CLINIC | Age: 60
End: 2020-09-01

## 2020-09-01 PROCEDURE — 97110 THERAPEUTIC EXERCISES: CPT

## 2020-09-01 NOTE — TELEPHONE ENCOUNTER
Carl Hendrickson from [employer] called to confirm norvasc (amlodipine) dose is 5mg. Confirmed with Carl Hendrickson, technician. She could not get a pharmacist on call. Carl Hendrickson asked that we call the pharmacist line at [phone] and leave detailed msg to confirm. Done.

## 2020-09-01 NOTE — PROGRESS NOTES
Dx:  Post op R JANINE       Insurance (Authorized # of Visits):  15           Authorizing Physician: Dr. Colby ref. provider found  Next MD visit: Fall Risk: standard         Precautions: anterior  approach              Subjective:   Feeling better overall.   Sti Date: 8/3/2020  Tx#: 7/10 Date: 8/6/2020  Tx#: 8/10 8/18/20  9/12 10/12  10/12 11/12  8/25/20 12/12  8/27/20 1/10  9/1/20   Upright bike 6 mins   Step ups 6 inch 10 x   SL on with ball rolling 10  X each lateral and side  R 10 x rolling ball   Step down 10 cords R only     Marching 10 x 2  6 inch 10 x 2 in bars  SL balance 20 secs  1/2 tandem balance on foam 10 x each side    Bridge 10 x 2  Bridge 10 x 2 ball add   Clams red band in side lying 10 x  2  Abd no band 10 x2   PROM R hip all planes  HS stretch 3 furnished under this plan of treatment and while under my care.     X___________________________________________________ Date____________________    Certification From: 4/93/4231  To:11/25/2020

## 2020-09-03 ENCOUNTER — OFFICE VISIT (OUTPATIENT)
Dept: PHYSICAL THERAPY | Age: 60
End: 2020-09-03
Attending: ORTHOPAEDIC SURGERY
Payer: COMMERCIAL

## 2020-09-03 PROCEDURE — 97110 THERAPEUTIC EXERCISES: CPT

## 2020-09-03 NOTE — PROGRESS NOTES
Dx:  Post op R JANINE       Insurance (Authorized # of Visits):  15           Authorizing Physician: Dr. Colby ref. provider found  Next MD visit: Fall Risk: standard         Precautions: anterior  approach              Subjective:  Doing pretty good.   Low back 7/23/20              TX#: 4/12 Date:    7/28/20          TX#: 5/ Date: 7/30/20  Tx#: 6/10 Date: 8/3/2020  Tx#: 7/10 Date: 8/6/2020  Tx#: 8/10 8/18/20  9/12 10/12  10/12 11/12  8/25/20 12/12  8/27/20 1/10  9/1/20 2/10  9/3/20   Upright bike 6 mins   Step up ea  Standing Hip ABD Iso into wall 5\" x 10 Upright 6 mins     Reformer 4 cords 10 x 2 B    SL squats 10 x 2 cords R only     Marching 10 x 2  6 inch 10 x 2 in bars  SL balance 20 secs  1/2 tandem balance on foam 10 x each side    Bridge 10 x 2  Bridge 10 Charges: EX 2       Total Timed Treatment: 35 min  Total Treatment Time: 35 min pt had wrong time and was late  FOTO: NT      Sincerely,  Electronically signed by therapist: Vee Melchor PT     Physician's certification required:  Yes  Please co-s

## 2020-09-03 NOTE — ANESTHESIA PREPROCEDURE EVALUATION
PRE-OP EVALUATION    Patient Name: Kennedy Barth    Pre-op Diagnosis: Carcinoid tumor of cecum [D3A.021]    Procedure(s):  Laparoscopic, possible open, ileocolic resection    Surgeon(s) and Role:     Reilly Ravi MD - Primary     * June Keller MD Patient called office for COVID results. Has procedure scheduled for tomorrow. Results negative. Pt notified and v/u. Cap Take 2,000 Units by mouth daily. Disp:  Rfl:    Cyanocobalamin (VITAMIN B 12 OR) Take 1 tablet by mouth daily. Disp:  Rfl:    Teriflunomide 14 MG Oral Tab Take 14 mg by mouth daily.    Disp:  Rfl:    aspirin 81 MG Oral Tab Take 1 tablet by mouth saúl Smoking status: Never Smoker      Smokeless tobacco: Never Used    Alcohol use: Yes      Alcohol/week: 0.0 oz      Comment: 2 to 3 beers a week      Drug use: No     Available pre-op labs reviewed.   Lab Results   Component Value Date    WBC 4.6 01/28/2019

## 2020-09-17 ENCOUNTER — OFFICE VISIT (OUTPATIENT)
Dept: PHYSICAL THERAPY | Age: 60
End: 2020-09-17
Attending: ORTHOPAEDIC SURGERY
Payer: COMMERCIAL

## 2020-09-17 PROCEDURE — 97110 THERAPEUTIC EXERCISES: CPT

## 2020-09-17 NOTE — PROGRESS NOTES
Dx:  Post op R JANINE       Insurance (Authorized # of Visits):  15           Authorizing Physician: Dr. Colby ref.  provider found  Next MD visit: Fall Risk: standard         Precautions: anterior  approach              Discharge Summary  Pt has attended 15 visi 7/30/20  Tx#: 6/10 Date: 8/3/2020  Tx#: 7/10 Date: 8/6/2020  Tx#: 8/10 8/18/20  9/12 10/12  10/12 11/12  8/25/20 12/12  8/27/20 1/10  9/1/20 2/10  9/3/20 3/10  9/17/20   Upright bike 6 mins   Step ups 6 inch 10 x   SL on with ball rolling 10  X each latera Reformer 4 cords 10 x 2 B    SL squats 10 x 2 cords R only     Marching 10 x 2  6 inch 10 x 2 in bars  SL balance 20 secs  1/2 tandem balance on foam 10 x each side    Bridge 10 x 2  Bridge 10 x 2 ball add   Clams red band in side lying 10 x  2  Abd no ski pole  GTB 10 x each     PROM R hip/ HS stretch , hip flexor stretch 2 x 30 secs     STM L piriformis  5 mins total   Squats 10 x with cues to avoid lateral L leaning                                              HEP: cont with HEP issued and clams/abd a

## 2020-09-18 ENCOUNTER — IMAGING SERVICES (OUTPATIENT)
Dept: MRI IMAGING | Age: 60
End: 2020-09-18
Attending: PSYCHIATRY & NEUROLOGY

## 2020-09-18 DIAGNOSIS — G35 MULTIPLE SCLEROSIS (CMD): ICD-10-CM

## 2020-09-18 PROCEDURE — 70551 MRI BRAIN STEM W/O DYE: CPT | Performed by: RADIOLOGY

## 2020-09-18 PROCEDURE — 72141 MRI NECK SPINE W/O DYE: CPT | Performed by: RADIOLOGY

## 2020-10-05 ENCOUNTER — OFFICE VISIT (OUTPATIENT)
Dept: INTERNAL MEDICINE CLINIC | Facility: CLINIC | Age: 60
End: 2020-10-05
Payer: COMMERCIAL

## 2020-10-05 VITALS
HEART RATE: 76 BPM | WEIGHT: 193 LBS | TEMPERATURE: 98 F | DIASTOLIC BLOOD PRESSURE: 72 MMHG | RESPIRATION RATE: 16 BRPM | SYSTOLIC BLOOD PRESSURE: 144 MMHG | HEIGHT: 69.69 IN | OXYGEN SATURATION: 98 % | BODY MASS INDEX: 27.94 KG/M2

## 2020-10-05 DIAGNOSIS — G47.33 OSA ON CPAP: ICD-10-CM

## 2020-10-05 DIAGNOSIS — Z99.89 OSA ON CPAP: ICD-10-CM

## 2020-10-05 DIAGNOSIS — R73.03 PREDIABETES: ICD-10-CM

## 2020-10-05 DIAGNOSIS — E78.5 DYSLIPIDEMIA: ICD-10-CM

## 2020-10-05 DIAGNOSIS — G35 MULTIPLE SCLEROSIS (HCC): ICD-10-CM

## 2020-10-05 DIAGNOSIS — D3A.021 CARCINOID TUMOR OF CECUM, UNSPECIFIED WHETHER MALIGNANT: ICD-10-CM

## 2020-10-05 DIAGNOSIS — Z96.641 STATUS POST RIGHT HIP REPLACEMENT: ICD-10-CM

## 2020-10-05 DIAGNOSIS — Z00.00 ANNUAL PHYSICAL EXAM: Primary | ICD-10-CM

## 2020-10-05 DIAGNOSIS — K43.2 INCISIONAL HERNIA, WITHOUT OBSTRUCTION OR GANGRENE: ICD-10-CM

## 2020-10-05 DIAGNOSIS — I10 ESSENTIAL HYPERTENSION: ICD-10-CM

## 2020-10-05 DIAGNOSIS — M16.11 PRIMARY OSTEOARTHRITIS OF RIGHT HIP: ICD-10-CM

## 2020-10-05 PROCEDURE — 3078F DIAST BP <80 MM HG: CPT | Performed by: INTERNAL MEDICINE

## 2020-10-05 PROCEDURE — 3077F SYST BP >= 140 MM HG: CPT | Performed by: INTERNAL MEDICINE

## 2020-10-05 PROCEDURE — 3008F BODY MASS INDEX DOCD: CPT | Performed by: INTERNAL MEDICINE

## 2020-10-05 PROCEDURE — 99396 PREV VISIT EST AGE 40-64: CPT | Performed by: INTERNAL MEDICINE

## 2020-10-05 RX ORDER — LOSARTAN POTASSIUM 100 MG/1
100 TABLET ORAL DAILY
Qty: 90 TABLET | Refills: 0 | Status: SHIPPED | OUTPATIENT
Start: 2020-10-05 | End: 2020-10-05

## 2020-10-05 RX ORDER — LOSARTAN POTASSIUM 100 MG/1
100 TABLET ORAL DAILY
Qty: 90 TABLET | Refills: 0 | Status: SHIPPED | OUTPATIENT
Start: 2020-10-05 | End: 2021-04-05

## 2020-10-05 NOTE — PROGRESS NOTES
Carmen Yates  5/24/1960    Patient presents with:  Establish Care: RG rm 7 new Pt physical      HPI:   Carmen Yates is a 63year old male who presents for an annual physical examination.     The patinet has been in his usual state of health and pain    • Colon polyps    • Diverticulitis     left colon   • Diverticulosis    • Esophageal reflux 02/2016   • Fe deficiency anemia    • Frequent urination 2016    Believed to be side affect of MS   • High blood pressure    • High cholesterol    • Multipl SURGICAL HISTORY  2019    Repair of incarcerated incisional hernia   • TONSILLECTOMY     • VASECTOMY        Family History   Problem Relation Age of Onset   • Diabetes Maternal Grandfather    • Cancer Father         melanoma  18   • Other ( Diagnosed ~2015 w left sided weakness  No current symptoms  Follows with neurology service Q6mo at [facility]  Continue current management per neurology service    Hypertension:   Uncontrolled  DASH lifestyle discussed  Losartan increased from 50 mg daily to

## 2020-10-06 ENCOUNTER — APPOINTMENT (OUTPATIENT)
Dept: LAB | Age: 60
End: 2020-10-06
Attending: INTERNAL MEDICINE
Payer: COMMERCIAL

## 2020-10-06 DIAGNOSIS — Z01.818 PRE-OP TESTING: ICD-10-CM

## 2020-10-09 PROBLEM — Z86.010 PERSONAL HISTORY OF COLONIC POLYPS: Status: ACTIVE | Noted: 2020-10-09

## 2020-10-09 PROBLEM — K57.90 DIVERTICULOSIS: Status: ACTIVE | Noted: 2020-10-09

## 2020-10-09 PROBLEM — Z86.0100 PERSONAL HISTORY OF COLONIC POLYPS: Status: ACTIVE | Noted: 2020-10-09

## 2020-10-09 PROBLEM — K64.8 INTERNAL HEMORRHOIDS: Status: ACTIVE | Noted: 2020-10-09

## 2020-11-04 DIAGNOSIS — E78.5 DYSLIPIDEMIA: ICD-10-CM

## 2020-11-04 RX ORDER — PRAVASTATIN SODIUM 80 MG/1
TABLET ORAL
Qty: 90 TABLET | Refills: 1 | Status: SHIPPED | OUTPATIENT
Start: 2020-11-04 | End: 2021-01-08

## 2020-11-04 RX ORDER — FENOFIBRATE 145 MG/1
145 TABLET, COATED ORAL DAILY
Qty: 90 TABLET | Refills: 1 | Status: SHIPPED | OUTPATIENT
Start: 2020-11-04 | End: 2021-01-08

## 2020-11-04 NOTE — TELEPHONE ENCOUNTER
Last VISIT 10/05/20    Last REFILL 04/21/20 Both filled qty 90 w/1 refill    Last LABS 10/06/20 Covid test done    No Future Appointments      Per PROTOCOL? Passed    Please Approve or Deny.

## 2020-12-10 ENCOUNTER — TELEPHONE (OUTPATIENT)
Dept: HEMATOLOGY/ONCOLOGY | Facility: HOSPITAL | Age: 60
End: 2020-12-10

## 2020-12-10 NOTE — TELEPHONE ENCOUNTER
Patient called and asked if Dr. Lis Fall wants Patient to get blood work before the end of the year. Please call him. Thank you.  Anita

## 2020-12-12 ENCOUNTER — MED REC SCAN ONLY (OUTPATIENT)
Dept: INTERNAL MEDICINE CLINIC | Facility: CLINIC | Age: 60
End: 2020-12-12

## 2020-12-14 ENCOUNTER — TELEPHONE (OUTPATIENT)
Dept: INTERNAL MEDICINE CLINIC | Facility: CLINIC | Age: 60
End: 2020-12-14

## 2020-12-14 NOTE — TELEPHONE ENCOUNTER
Pt is calling to check on the status re: his [employer] message about c-pap supplies Please advise

## 2020-12-22 ENCOUNTER — TELEPHONE (OUTPATIENT)
Dept: HEMATOLOGY/ONCOLOGY | Facility: HOSPITAL | Age: 60
End: 2020-12-22

## 2020-12-22 NOTE — TELEPHONE ENCOUNTER
I left a message with Molly Arredondo. Ricci regarding a message from Dr. Devin Chanel office to schedule a f/u with him. I left him a VM today 12/22/20.  Anita

## 2021-01-04 ENCOUNTER — NURSE ONLY (OUTPATIENT)
Dept: HEMATOLOGY/ONCOLOGY | Age: 61
End: 2021-01-04
Attending: INTERNAL MEDICINE
Payer: COMMERCIAL

## 2021-01-04 DIAGNOSIS — D64.9 ANEMIA, UNSPECIFIED TYPE: ICD-10-CM

## 2021-01-04 LAB
BASOPHILS # BLD AUTO: 0.04 X10(3) UL (ref 0–0.2)
BASOPHILS NFR BLD AUTO: 1.1 %
DEPRECATED RDW RBC AUTO: 50.9 FL (ref 35.1–46.3)
EOSINOPHIL # BLD AUTO: 0.31 X10(3) UL (ref 0–0.7)
EOSINOPHIL NFR BLD AUTO: 8.2 %
ERYTHROCYTE [DISTWIDTH] IN BLOOD BY AUTOMATED COUNT: 15.9 % (ref 11–15)
HCT VFR BLD AUTO: 32.2 %
HGB BLD-MCNC: 10.4 G/DL
IMM GRANULOCYTES # BLD AUTO: 0.02 X10(3) UL (ref 0–1)
IMM GRANULOCYTES NFR BLD: 0.5 %
LYMPHOCYTES # BLD AUTO: 1.42 X10(3) UL (ref 1–4)
LYMPHOCYTES NFR BLD AUTO: 37.5 %
MCH RBC QN AUTO: 28.4 PG (ref 26–34)
MCHC RBC AUTO-ENTMCNC: 32.3 G/DL (ref 31–37)
MCV RBC AUTO: 88 FL
MONOCYTES # BLD AUTO: 0.56 X10(3) UL (ref 0.1–1)
MONOCYTES NFR BLD AUTO: 14.8 %
NEUTROPHILS # BLD AUTO: 1.44 X10 (3) UL (ref 1.5–7.7)
NEUTROPHILS # BLD AUTO: 1.44 X10(3) UL (ref 1.5–7.7)
NEUTROPHILS NFR BLD AUTO: 37.9 %
PLATELET # BLD AUTO: 321 10(3)UL (ref 150–450)
RBC # BLD AUTO: 3.66 X10(6)UL
WBC # BLD AUTO: 3.8 X10(3) UL (ref 4–11)

## 2021-01-04 PROCEDURE — 85025 COMPLETE CBC W/AUTO DIFF WBC: CPT

## 2021-01-04 PROCEDURE — 36415 COLL VENOUS BLD VENIPUNCTURE: CPT

## 2021-01-11 ENCOUNTER — OFFICE VISIT (OUTPATIENT)
Dept: HEMATOLOGY/ONCOLOGY | Facility: HOSPITAL | Age: 61
End: 2021-01-11
Attending: INTERNAL MEDICINE
Payer: COMMERCIAL

## 2021-01-11 VITALS
DIASTOLIC BLOOD PRESSURE: 69 MMHG | RESPIRATION RATE: 16 BRPM | SYSTOLIC BLOOD PRESSURE: 137 MMHG | HEART RATE: 76 BPM | OXYGEN SATURATION: 98 % | WEIGHT: 198 LBS | TEMPERATURE: 98 F | BODY MASS INDEX: 28.67 KG/M2 | HEIGHT: 69.69 IN

## 2021-01-11 DIAGNOSIS — D3A.021 CARCINOID TUMOR OF CECUM, UNSPECIFIED WHETHER MALIGNANT: Primary | ICD-10-CM

## 2021-01-11 DIAGNOSIS — D64.9 ANEMIA, UNSPECIFIED TYPE: ICD-10-CM

## 2021-01-11 LAB
BASOPHILS # BLD AUTO: 0.04 X10(3) UL (ref 0–0.2)
BASOPHILS NFR BLD AUTO: 1.2 %
DEPRECATED HBV CORE AB SER IA-ACNC: 87.1 NG/ML
DEPRECATED RDW RBC AUTO: 49.6 FL (ref 35.1–46.3)
EOSINOPHIL # BLD AUTO: 0.27 X10(3) UL (ref 0–0.7)
EOSINOPHIL NFR BLD AUTO: 7.9 %
ERYTHROCYTE [DISTWIDTH] IN BLOOD BY AUTOMATED COUNT: 15.5 % (ref 11–15)
FOLATE SERPL-MCNC: 19.3 NG/ML (ref 8.7–?)
HCT VFR BLD AUTO: 32.3 %
HGB BLD-MCNC: 10.4 G/DL
HGB RETIC QN AUTO: 34.5 PG (ref 28.2–36.6)
IMM GRANULOCYTES # BLD AUTO: 0.02 X10(3) UL (ref 0–1)
IMM GRANULOCYTES NFR BLD: 0.6 %
IMM RETICS NFR: 0.14 RATIO (ref 0.1–0.3)
IRON SATURATION: 24 %
IRON SERPL-MCNC: 135 UG/DL
LYMPHOCYTES # BLD AUTO: 1.18 X10(3) UL (ref 1–4)
LYMPHOCYTES NFR BLD AUTO: 34.4 %
MCH RBC QN AUTO: 28.3 PG (ref 26–34)
MCHC RBC AUTO-ENTMCNC: 32.2 G/DL (ref 31–37)
MCV RBC AUTO: 87.8 FL
MONOCYTES # BLD AUTO: 0.41 X10(3) UL (ref 0.1–1)
MONOCYTES NFR BLD AUTO: 12 %
NEUTROPHILS # BLD AUTO: 1.51 X10 (3) UL (ref 1.5–7.7)
NEUTROPHILS # BLD AUTO: 1.51 X10(3) UL (ref 1.5–7.7)
NEUTROPHILS NFR BLD AUTO: 43.9 %
PLATELET # BLD AUTO: 289 10(3)UL (ref 150–450)
RBC # BLD AUTO: 3.68 X10(6)UL
RETICS # AUTO: 66.2 X10(3) UL (ref 22.5–147.5)
RETICS/RBC NFR AUTO: 1.8 %
TOTAL IRON BINDING CAPACITY: 553 UG/DL (ref 240–450)
TRANSFERRIN SERPL-MCNC: 371 MG/DL (ref 200–360)
VIT B12 SERPL-MCNC: 812 PG/ML (ref 193–986)
WBC # BLD AUTO: 3.4 X10(3) UL (ref 4–11)

## 2021-01-11 PROCEDURE — 99214 OFFICE O/P EST MOD 30 MIN: CPT | Performed by: INTERNAL MEDICINE

## 2021-01-11 NOTE — PROGRESS NOTES
Cancer Center Progress Note  Patient Name: Benson Haney   YOB: 1960   Medical Record Number: [id number]     Attending Physician: Julito Cuadra M.D. Date of Visit: 1/11/2021      Chief Complaint:  Patient presents with:   Kailey Nettles BOWEL RESECTION     • COLECTOMY  2011 & 2019    To remove colon cancer   • COLONOSCOPY  01/31/2012   • COLONOSCOPY  1/2015    since colon cancer have 1 every 3 years   • EXPLORATORY LAPAROTOMY N/A 3/8/2019    Performed by Asya Martins MD at [address] [place] [address] Yes        Alcohol/week: 2.0 - 3.0 standard drinks        Types: 2 - 3 Cans of beer per week        Comment: 2 to 3 beers a week      Drug use: No      Sexual activity: Not on file    Lifestyle      Physical activity        Days per week: Not on file amLODIPine Besylate 5 MG Oral Tab, Take 1 tablet (5 mg total) by mouth daily. , Disp: 90 tablet, Rfl: 1  •  acetaminophen 500 MG Oral Tab, Take 1 tablet (500 mg total) by mouth 4 (four) times daily.  (Patient not taking: Reported on 10/5/2020 ), Disp: 120 ta Resp 16   Ht 1.77 m (5' 9.69\")   Wt 89.8 kg (198 lb)   SpO2 98%   BMI 28.67 kg/m²     Physical Examination:    Constitutional Normal - Mood and affect appropriate. Appears close to chronological age. Well nourished. Well developed.    Eyes Normal - Conjun review and charting: 10 min  Visit: 15 min  Post-visit Chartin min    Electronically Signed by: Mehran Barcenas M.D.   [facility]

## 2021-01-13 LAB
ALBUMIN SERPL ELPH-MCNC: 4.65 G/DL (ref 3.75–5.21)
ALBUMIN/GLOB SERPL: 1.75 {RATIO} (ref 1–2)
ALPHA1 GLOB SERPL ELPH-MCNC: 0.31 G/DL (ref 0.19–0.46)
ALPHA2 GLOB SERPL ELPH-MCNC: 0.65 G/DL (ref 0.48–1.05)
B-GLOBULIN SERPL ELPH-MCNC: 0.82 G/DL (ref 0.68–1.23)
GAMMA GLOB SERPL ELPH-MCNC: 0.87 G/DL (ref 0.62–1.7)
KAPPA LC FREE SER-MCNC: 2 MG/DL (ref 0.33–1.94)
KAPPA LC FREE/LAMBDA FREE SER NEPH: 1.26 {RATIO} (ref 0.26–1.65)
LAMBDA LC FREE SERPL-MCNC: 1.58 MG/DL (ref 0.57–2.63)
M PROTEIN MFR SERPL ELPH: 7.3 G/DL (ref 6.4–8.2)

## 2021-01-14 LAB — CHROMOGRANIN A: 348 NG/ML

## 2021-01-29 ENCOUNTER — OFFICE VISIT (OUTPATIENT)
Dept: HEMATOLOGY/ONCOLOGY | Facility: HOSPITAL | Age: 61
End: 2021-01-29
Attending: INTERNAL MEDICINE
Payer: COMMERCIAL

## 2021-01-29 VITALS
BODY MASS INDEX: 28.81 KG/M2 | HEART RATE: 75 BPM | RESPIRATION RATE: 18 BRPM | WEIGHT: 199 LBS | TEMPERATURE: 97 F | DIASTOLIC BLOOD PRESSURE: 78 MMHG | SYSTOLIC BLOOD PRESSURE: 152 MMHG | OXYGEN SATURATION: 100 % | HEIGHT: 69.69 IN

## 2021-01-29 DIAGNOSIS — D64.9 ANEMIA, UNSPECIFIED TYPE: ICD-10-CM

## 2021-01-29 LAB
BASOPHILS # BLD AUTO: 0.05 X10(3) UL (ref 0–0.2)
BASOPHILS NFR BLD AUTO: 1.2 %
DEPRECATED RDW RBC AUTO: 51.3 FL (ref 35.1–46.3)
EOSINOPHIL # BLD AUTO: 0.33 X10(3) UL (ref 0–0.7)
EOSINOPHIL NFR BLD AUTO: 7.8 %
ERYTHROCYTE [DISTWIDTH] IN BLOOD BY AUTOMATED COUNT: 15.6 % (ref 11–15)
HCT VFR BLD AUTO: 32.2 %
HGB BLD-MCNC: 10.4 G/DL
IMM GRANULOCYTES # BLD AUTO: 0.01 X10(3) UL (ref 0–1)
IMM GRANULOCYTES NFR BLD: 0.2 %
LYMPHOCYTES # BLD AUTO: 1.58 X10(3) UL (ref 1–4)
LYMPHOCYTES NFR BLD AUTO: 37.4 %
MCH RBC QN AUTO: 28.9 PG (ref 26–34)
MCHC RBC AUTO-ENTMCNC: 32.3 G/DL (ref 31–37)
MCV RBC AUTO: 89.4 FL
MONOCYTES # BLD AUTO: 0.63 X10(3) UL (ref 0.1–1)
MONOCYTES NFR BLD AUTO: 14.9 %
NEUTROPHILS # BLD AUTO: 1.63 X10 (3) UL (ref 1.5–7.7)
NEUTROPHILS # BLD AUTO: 1.63 X10(3) UL (ref 1.5–7.7)
NEUTROPHILS NFR BLD AUTO: 38.5 %
PLATELET # BLD AUTO: 297 10(3)UL (ref 150–450)
RBC # BLD AUTO: 3.6 X10(6)UL
WBC # BLD AUTO: 4.2 X10(3) UL (ref 4–11)

## 2021-01-29 PROCEDURE — 38222 DX BONE MARROW BX & ASPIR: CPT | Performed by: INTERNAL MEDICINE

## 2021-01-29 NOTE — PROGRESS NOTES
Procedure Note  Procedure: Bone Marrow Biopsy and Aspirate    Indication: Anemia    Consent: consent for the above procedure was obtained from [employer].  The procedure was discussed with the patient and risks of the procedure including pain at the

## 2021-02-05 LAB
CD10 CELLS NFR SPEC: 1 %
CD11C CELLS NFR SPEC: 12 %
CD14 CELLS NFR SPEC: 1 %
CD19 CELLS NFR SPEC: 23 %
CD19/CD10 CELLS: 1 %
CD20 CELLS NFR SPEC: 23 %
CD22 CELLS NFR SPEC: 23 %
CD23 CELLS NFR SPEC: 10 %
CD3 CELLS NFR SPEC: 66 %
CD3+CD4+ CELLS NFR SPEC: 55 %
CD3+CD4+ CELLS/CD3+CD8+ CLL SPEC: 5.5
CD3+CD8+ CELLS NFR SPEC: 10 %
CD45 CELLS NFR SPEC: 100 %
CD5 CELLS NFR SPEC: 66 %
CD5/CD19 CELLS: 4 %
CD56 CELLS NFR SPEC: 10 %
CD7 CELLS NFR SPEC: 68 %
CELL SURF KAPPA/LAMBDA RATIO: 1.9
CELL SURF LAMBDA LIGHT CHAIN: 8 %
CELL SURFACE KAPPA LIGHT CHAIN: 15 %
FMC7 CELLS NFR SPEC: 15 %

## 2021-02-09 DIAGNOSIS — Z23 NEED FOR VACCINATION: ICD-10-CM

## 2021-02-12 ENCOUNTER — OFFICE VISIT (OUTPATIENT)
Dept: HEMATOLOGY/ONCOLOGY | Facility: HOSPITAL | Age: 61
End: 2021-02-12
Attending: INTERNAL MEDICINE
Payer: COMMERCIAL

## 2021-02-12 VITALS
TEMPERATURE: 97 F | BODY MASS INDEX: 28 KG/M2 | OXYGEN SATURATION: 99 % | SYSTOLIC BLOOD PRESSURE: 146 MMHG | DIASTOLIC BLOOD PRESSURE: 76 MMHG | RESPIRATION RATE: 18 BRPM | HEART RATE: 71 BPM | WEIGHT: 196.5 LBS

## 2021-02-12 DIAGNOSIS — T45.1X5A ANEMIA DUE TO IMMUNOSUPPRESSIVE MEDICATION: Primary | ICD-10-CM

## 2021-02-12 DIAGNOSIS — C7A.021 MALIGNANT CARCINOID TUMOR OF CECUM (HCC): ICD-10-CM

## 2021-02-12 DIAGNOSIS — D64.9 ANEMIA DUE TO IMMUNOSUPPRESSIVE MEDICATION: Primary | ICD-10-CM

## 2021-02-12 PROCEDURE — 99213 OFFICE O/P EST LOW 20 MIN: CPT | Performed by: INTERNAL MEDICINE

## 2021-02-12 NOTE — PROGRESS NOTES
Cancer Center Progress Note  Patient Name: Bulmaro Davis   YOB: 1960   Medical Record Number: [id number]     Attending Physician: Ingris Bassett M.D.      Date of Visit: 2/12/2021        Chief Complaint:  Patient presents with:  Pr remove colon cancer   • COLONOSCOPY  01/31/2012   • COLONOSCOPY  1/2015    since colon cancer have 1 every 3 years   • EXPLORATORY LAPAROTOMY N/A 3/8/2019    Performed by Lucho Garcia MD at [address]   • FRACTURE SURGERY      Fracture of right 4th finge drinks        Types: 2 - 3 Cans of beer per week        Comment: 2 to 3 beers a week      Drug use: No      Sexual activity: Not on file    Lifestyle      Physical activity        Days per week: Not on file        Minutes per session: Not on file      Stre acid 1 MG Oral Tab, Take by mouth daily. , Disp: , Rfl:   •  Omega-3-acid Ethyl Esters 1 g Oral Cap, Take 1 g by mouth daily. , Disp: , Rfl:   •  Oxybutynin Chloride 5 MG Oral Tab, Take 10 mg by mouth as needed. , Disp: , Rfl:   •  Psyllium (METAMUCIL FIBER O and without icterus. Pupils are reactive and equal.   Hematologic/Lymphatic Normal - No petechiae or purpura. No tender or palpable lymph nodes in the cervical, supraclavicular, axillary or inguinal area.    Respiratory Normal - Lungs CTA, no rhonchi or wh face to face, 10 min spent counseling. Post-visit Chartin min    Electronically Signed by: Teodoro Ross M.D.   [facility]

## 2021-04-05 RX ORDER — LOSARTAN POTASSIUM 100 MG/1
100 TABLET ORAL DAILY
Qty: 90 TABLET | Refills: 0 | Status: SHIPPED | OUTPATIENT
Start: 2021-04-05 | End: 2021-06-11

## 2021-04-05 NOTE — TELEPHONE ENCOUNTER
Last VISIT 10/05/20    Last REFILL 10/05/20 qty 90 w/0 refills    Last LABS 01/29/21 CBC done    No Future Appointments      Per PROTOCOL? Failed    Please Approve or Deny.

## 2021-05-10 ENCOUNTER — OFFICE VISIT (OUTPATIENT)
Dept: INTERNAL MEDICINE CLINIC | Facility: CLINIC | Age: 61
End: 2021-05-10
Payer: COMMERCIAL

## 2021-05-10 ENCOUNTER — HOSPITAL ENCOUNTER (OUTPATIENT)
Dept: GENERAL RADIOLOGY | Age: 61
Discharge: HOME | End: 2021-05-10
Attending: INTERNAL MEDICINE
Payer: COMMERCIAL

## 2021-05-10 VITALS
SYSTOLIC BLOOD PRESSURE: 130 MMHG | HEART RATE: 74 BPM | WEIGHT: 200 LBS | TEMPERATURE: 98 F | DIASTOLIC BLOOD PRESSURE: 78 MMHG | HEIGHT: 69.5 IN | BODY MASS INDEX: 28.96 KG/M2 | OXYGEN SATURATION: 98 % | RESPIRATION RATE: 16 BRPM

## 2021-05-10 DIAGNOSIS — R10.13 EPIGASTRIC PAIN: ICD-10-CM

## 2021-05-10 DIAGNOSIS — R10.13 EPIGASTRIC PAIN: Primary | ICD-10-CM

## 2021-05-10 DIAGNOSIS — L60.8 TOENAIL DEFORMITY: ICD-10-CM

## 2021-05-10 PROCEDURE — 3075F SYST BP GE 130 - 139MM HG: CPT | Performed by: INTERNAL MEDICINE

## 2021-05-10 PROCEDURE — 74018 RADEX ABDOMEN 1 VIEW: CPT | Performed by: INTERNAL MEDICINE

## 2021-05-10 PROCEDURE — 3078F DIAST BP <80 MM HG: CPT | Performed by: INTERNAL MEDICINE

## 2021-05-10 PROCEDURE — 3008F BODY MASS INDEX DOCD: CPT | Performed by: INTERNAL MEDICINE

## 2021-05-10 PROCEDURE — 99213 OFFICE O/P EST LOW 20 MIN: CPT | Performed by: INTERNAL MEDICINE

## 2021-05-10 RX ORDER — OMEPRAZOLE 20 MG/1
20 CAPSULE, DELAYED RELEASE ORAL
COMMUNITY
End: 2021-05-10

## 2021-05-10 RX ORDER — GARLIC EXTRACT 500 MG
1 CAPSULE ORAL DAILY
COMMUNITY

## 2021-05-10 RX ORDER — OMEPRAZOLE 40 MG/1
40 CAPSULE, DELAYED RELEASE ORAL DAILY
Qty: 30 CAPSULE | Refills: 0 | Status: SHIPPED | OUTPATIENT
Start: 2021-05-10

## 2021-05-10 NOTE — PROGRESS NOTES
Rusty Kendall  5/24/1960    Patient presents with:  Abdominal Pain: RG rm 7 stomach discomfort for over a week      SUBJECTIVE   Rusty Kendall is a 63year old male who presents with abdominal discomfort.     The patient describes a long-standing Omega-3-acid Ethyl Esters 1 g Oral Cap Take 1 g by mouth daily. • Oxybutynin Chloride 5 MG Oral Tab Take 10 mg by mouth as needed. • Psyllium (METAMUCIL FIBER OR) Take by mouth daily.      • Vardenafil HCl (LEVITRA) 20 MG Oral Tab Take 1 tablet (20 cancer surgury   • BOWEL RESECTION     • COLECTOMY  2011 & 2019    To remove colon cancer   • COLONOSCOPY  01/31/2012   • COLONOSCOPY  1/2015    since colon cancer have 1 every 3 years   • FRACTURE SURGERY      Fracture of right 4th finger repaired - no me AXR has been ordered  If AXR is unrevealing and no symptom response with PPI therapy may need further evaluation by the GI service    The patient indicates understanding of these issues and agrees to the plan.     TODAY'S ORDERS     No orders of the defined

## 2021-05-27 ENCOUNTER — OFFICE VISIT (OUTPATIENT)
Dept: PODIATRY CLINIC | Facility: CLINIC | Age: 61
End: 2021-05-27
Payer: COMMERCIAL

## 2021-05-27 DIAGNOSIS — B35.1 DERMATOPHYTOSIS OF NAIL: Primary | ICD-10-CM

## 2021-05-27 PROCEDURE — 99203 OFFICE O/P NEW LOW 30 MIN: CPT | Performed by: PODIATRIST

## 2021-05-27 PROCEDURE — 11720 DEBRIDE NAIL 1-5: CPT | Performed by: PODIATRIST

## 2021-05-27 RX ORDER — AMLODIPINE BESYLATE 10 MG/1
TABLET ORAL
COMMUNITY
Start: 2021-04-05 | End: 2022-02-01

## 2021-05-27 NOTE — PROGRESS NOTES
Jaswant Iniguez is a 57year old male. Patient presents with:  New Patient: thick and dark 2nd toenails bilateral feet - denies any pain.         HPI:   This pleasant patient returns to the clinic he is here for his second toenails on both of his feet th Teriflunomide 14 MG Oral Tab Take 14 mg by mouth daily.  AUBAGIO         Past Medical History:   Diagnosis Date   • Calculus of kidney 1980   • Cancer [facility]) 2011    colon   • Carcinoid tumor     cecal carcinoid   • Chest pain    • Colon polyps    • Divertic Marital status:       Spouse name: Not on file      Number of children: Not on file      Years of education: Not on file      Highest education level: Not on file    Tobacco Use      Smoking status: Never Smoker      Smokeless tobacco: Never Used on the second digit I recommended that he discard those. I trimmed and debrided the toenails on the second toe bilateral at today's visit taking a specimen for fungal culture of the second toe on the right foot.   I trimmed down and debrided hyperkeratoses

## 2021-06-10 ENCOUNTER — TELEPHONE (OUTPATIENT)
Dept: PODIATRY CLINIC | Facility: CLINIC | Age: 61
End: 2021-06-10

## 2021-06-10 DIAGNOSIS — B35.1 ONYCHOMYCOSIS: Primary | ICD-10-CM

## 2021-06-11 RX ORDER — LOSARTAN POTASSIUM 100 MG/1
TABLET ORAL
Qty: 90 TABLET | Refills: 1 | Status: SHIPPED | OUTPATIENT
Start: 2021-06-11 | End: 2021-12-06

## 2021-06-11 NOTE — TELEPHONE ENCOUNTER
Cream is not indicated on the toenails but we should try Jublia if we do it should come from [facility].

## 2021-06-11 NOTE — TELEPHONE ENCOUNTER
I called and spoke with the patient. Discussed option for Jublia. Discussed that Cy Frank can take up to 1 year to treat infection. Patient opted to try Lamisil. Order placed for hepatic function panel.    Pt understands he will be contacted once lab re

## 2021-06-11 NOTE — TELEPHONE ENCOUNTER
Odilon [place]   6/4/2021  4:43 PM CDT Back to Top      Results reviewed. Sreekanth Martinez inform patient that fungal culture results are positive and we should proceed with Lamisil tablet therapy.  If the patient agrees we have to do a hepatic function pa

## 2021-06-11 NOTE — TELEPHONE ENCOUNTER
Patient contacted (Name and  of pt verified). All results and recommendations reviewed. Dr. Rosa Elena Maradiaga, the patient states you discussed a cream at his visit that he could possibly use instead of lamisil tablets.  Patient is interested in a cream prescr

## 2021-06-14 ENCOUNTER — HOSPITAL ENCOUNTER (OUTPATIENT)
Dept: NUCLEAR MEDICINE | Facility: HOSPITAL | Age: 61
Discharge: HOME | End: 2021-06-14
Attending: INTERNAL MEDICINE
Payer: COMMERCIAL

## 2021-06-14 DIAGNOSIS — C7A.021 MALIGNANT CARCINOID TUMOR OF CECUM (HCC): ICD-10-CM

## 2021-06-14 PROCEDURE — 78815 PET IMAGE W/CT SKULL-THIGH: CPT | Performed by: INTERNAL MEDICINE

## 2021-06-16 ENCOUNTER — LAB ENCOUNTER (OUTPATIENT)
Dept: LAB | Age: 61
End: 2021-06-16
Attending: INTERNAL MEDICINE
Payer: COMMERCIAL

## 2021-06-16 DIAGNOSIS — C7A.021 MALIGNANT CARCINOID TUMOR OF CECUM (HCC): ICD-10-CM

## 2021-06-16 PROCEDURE — 36415 COLL VENOUS BLD VENIPUNCTURE: CPT

## 2021-06-16 PROCEDURE — 86316 IMMUNOASSAY TUMOR OTHER: CPT

## 2021-06-16 PROCEDURE — 80053 COMPREHEN METABOLIC PANEL: CPT

## 2021-06-16 PROCEDURE — 82248 BILIRUBIN DIRECT: CPT | Performed by: PODIATRIST

## 2021-06-16 PROCEDURE — 85025 COMPLETE CBC W/AUTO DIFF WBC: CPT

## 2021-06-18 ENCOUNTER — TELEPHONE (OUTPATIENT)
Dept: PODIATRY CLINIC | Facility: CLINIC | Age: 61
End: 2021-06-18

## 2021-06-18 NOTE — TELEPHONE ENCOUNTER
Pt states he went to the lab Wednesday, calling checking on the status. Would like to know if lamisil would be rx.  Pharmacy has been confirmed, pls advise

## 2021-06-21 ENCOUNTER — PATIENT MESSAGE (OUTPATIENT)
Dept: PODIATRY CLINIC | Facility: CLINIC | Age: 61
End: 2021-06-21

## 2021-06-21 DIAGNOSIS — B35.1 ONYCHOMYCOSIS: Primary | ICD-10-CM

## 2021-06-21 NOTE — TELEPHONE ENCOUNTER
From: Bulmaro Davis  To: Nick Lopez DPM  Sent: 6/21/2021 3:20 PM CDT  Subject: Prescription Question    Hi Dr Jericho Chase,  I heard back from Dr Reece Daily regarding liver question.  He said we can move forward with the medication, as long as we lew

## 2021-06-21 NOTE — TELEPHONE ENCOUNTER
Called pt and informed him per Dr. Louise Jiménez message. He will call his pcp to discuss the elevate AST and CB .

## 2021-06-23 RX ORDER — TERBINAFINE HYDROCHLORIDE 250 MG/1
250 TABLET ORAL DAILY
Qty: 30 TABLET | Refills: 0 | Status: SHIPPED | OUTPATIENT
Start: 2021-06-23 | End: 2022-02-01 | Stop reason: ALTCHOICE

## 2021-06-23 NOTE — TELEPHONE ENCOUNTER
Let the patient know that I have prescribed 30 tablets and placed a hepatic function panel in his chart that he must have before I give him another prescription.   The prescription is for 30 tablets when the stones of 30 tablets tell him to get the function

## 2021-06-24 ENCOUNTER — TELEPHONE (OUTPATIENT)
Dept: PODIATRY CLINIC | Facility: CLINIC | Age: 61
End: 2021-06-24

## 2021-06-24 NOTE — TELEPHONE ENCOUNTER
See also mychart encounter from 6/21. Rx was sent, patient to have hepatic function panel now and in 1 month before future refills. Patient was notified with understanding. Will have blood drawn tomorrow morning and obtain rx.

## 2021-06-25 ENCOUNTER — LAB ENCOUNTER (OUTPATIENT)
Dept: LAB | Age: 61
End: 2021-06-25
Attending: PODIATRIST
Payer: COMMERCIAL

## 2021-06-25 DIAGNOSIS — B35.1 ONYCHOMYCOSIS: ICD-10-CM

## 2021-06-25 PROCEDURE — 80076 HEPATIC FUNCTION PANEL: CPT

## 2021-06-25 PROCEDURE — 36415 COLL VENOUS BLD VENIPUNCTURE: CPT

## 2021-07-13 ENCOUNTER — PATIENT MESSAGE (OUTPATIENT)
Dept: INTERNAL MEDICINE CLINIC | Facility: CLINIC | Age: 61
End: 2021-07-13

## 2021-07-13 ENCOUNTER — TELEPHONE (OUTPATIENT)
Dept: PODIATRY CLINIC | Facility: CLINIC | Age: 61
End: 2021-07-13

## 2021-07-13 DIAGNOSIS — R74.01 ELEVATED AST (SGOT): Primary | ICD-10-CM

## 2021-07-13 DIAGNOSIS — Z87.442 HISTORY OF KIDNEY STONES: ICD-10-CM

## 2021-07-13 NOTE — TELEPHONE ENCOUNTER
From: Jaycee Akers  To: Thomas Santoyo MD  Sent: 7/13/2021 10:10 AM CDT  Subject: Test Results Question    Hi Dr Usha Shannon,  sorry to bother you. I received a call from Dr Roberto Bianchi staff this morning.  He saw on my last blood work for him one of the liver

## 2021-07-13 NOTE — TELEPHONE ENCOUNTER
Spoke to pt and informed him of Gabriel's message below. Pt will call Dr. Crystal Lan and Audi message our office with his response.    * See Gabriel's instructions in MyChart encounter from 06/21/21:  Ezequiel Williamson DPM         4:57 PM  Note     Let the

## 2021-07-13 NOTE — TELEPHONE ENCOUNTER
----- Message from Jameson Stokes DPM sent at 7/7/2021  3:23 PM CDT -----  Please have patient contact his PCP for Lamisil tablet clearance as one of his liver enzymes is elevated but reassured the patient it is only mildly elevated and not severe.

## 2021-07-14 NOTE — TELEPHONE ENCOUNTER
Please see TE on 6/21. I am okay with the AST of 50 followed by 49. We can repeat a CMP this week to ensure stability. Referral to Dr. Zaida Jalloh placed.  However, if symptomatic from a kidney stone we may need further evaluation at this time, such as imaging

## 2021-07-15 ENCOUNTER — PATIENT MESSAGE (OUTPATIENT)
Dept: PODIATRY CLINIC | Facility: CLINIC | Age: 61
End: 2021-07-15

## 2021-07-15 DIAGNOSIS — B35.1 ONYCHOMYCOSIS: Primary | ICD-10-CM

## 2021-07-20 ENCOUNTER — LAB ENCOUNTER (OUTPATIENT)
Dept: LAB | Age: 61
End: 2021-07-20
Attending: PODIATRIST
Payer: COMMERCIAL

## 2021-07-20 DIAGNOSIS — Z00.00 ANNUAL PHYSICAL EXAM: ICD-10-CM

## 2021-07-20 DIAGNOSIS — I10 ESSENTIAL HYPERTENSION: ICD-10-CM

## 2021-07-20 DIAGNOSIS — R73.03 PREDIABETES: ICD-10-CM

## 2021-07-20 DIAGNOSIS — R74.01 ELEVATED AST (SGOT): ICD-10-CM

## 2021-07-20 DIAGNOSIS — E78.5 DYSLIPIDEMIA: ICD-10-CM

## 2021-07-20 DIAGNOSIS — B35.1 ONYCHOMYCOSIS: ICD-10-CM

## 2021-07-20 LAB
ALBUMIN SERPL-MCNC: 3.7 G/DL (ref 3.4–5)
ALBUMIN/GLOB SERPL: 1.1 {RATIO} (ref 1–2)
ALP LIVER SERPL-CCNC: 43 U/L
ALT SERPL-CCNC: 25 U/L
ANION GAP SERPL CALC-SCNC: 6 MMOL/L (ref 0–18)
AST SERPL-CCNC: 36 U/L (ref 15–37)
BILIRUB DIRECT SERPL-MCNC: 0.2 MG/DL (ref 0–0.2)
BILIRUB SERPL-MCNC: 0.6 MG/DL (ref 0.1–2)
BUN BLD-MCNC: 24 MG/DL (ref 7–18)
BUN/CREAT SERPL: 13.6 (ref 10–20)
CALCIUM BLD-MCNC: 9.5 MG/DL (ref 8.5–10.1)
CHLORIDE SERPL-SCNC: 104 MMOL/L (ref 98–112)
CHOLEST SMN-MCNC: 164 MG/DL (ref ?–200)
CO2 SERPL-SCNC: 26 MMOL/L (ref 21–32)
CREAT BLD-MCNC: 1.76 MG/DL
GLOBULIN PLAS-MCNC: 3.5 G/DL (ref 2.8–4.4)
GLUCOSE BLD-MCNC: 168 MG/DL (ref 70–99)
HDLC SERPL-MCNC: 9 MG/DL (ref 40–59)
LDLC SERPL CALC-MCNC: 104 MG/DL (ref ?–100)
M PROTEIN MFR SERPL ELPH: 7.2 G/DL (ref 6.4–8.2)
NONHDLC SERPL-MCNC: 155 MG/DL (ref ?–130)
OSMOLALITY SERPL CALC.SUM OF ELEC: 290 MOSM/KG (ref 275–295)
PATIENT FASTING Y/N/NP: NO
PATIENT FASTING Y/N/NP: NO
POTASSIUM SERPL-SCNC: 4 MMOL/L (ref 3.5–5.1)
SODIUM SERPL-SCNC: 136 MMOL/L (ref 136–145)
TRIGL SERPL-MCNC: 291 MG/DL (ref 30–149)
VLDLC SERPL CALC-MCNC: 50 MG/DL (ref 0–30)

## 2021-07-20 PROCEDURE — 36415 COLL VENOUS BLD VENIPUNCTURE: CPT

## 2021-07-20 PROCEDURE — 80053 COMPREHEN METABOLIC PANEL: CPT

## 2021-07-20 PROCEDURE — 82248 BILIRUBIN DIRECT: CPT

## 2021-07-20 PROCEDURE — 80061 LIPID PANEL: CPT

## 2021-08-05 DIAGNOSIS — E78.5 DYSLIPIDEMIA: ICD-10-CM

## 2021-08-05 RX ORDER — FENOFIBRATE 145 MG/1
TABLET, COATED ORAL
Qty: 90 TABLET | Refills: 1 | Status: SHIPPED | OUTPATIENT
Start: 2021-08-05 | End: 2022-01-07

## 2021-08-07 DIAGNOSIS — E78.5 DYSLIPIDEMIA: ICD-10-CM

## 2021-08-09 RX ORDER — PRAVASTATIN SODIUM 80 MG/1
TABLET ORAL
Qty: 90 TABLET | Refills: 1 | Status: SHIPPED | OUTPATIENT
Start: 2021-08-09 | End: 2022-01-17

## 2021-08-25 ENCOUNTER — LAB ENCOUNTER (OUTPATIENT)
Dept: LAB | Age: 61
End: 2021-08-25
Attending: PODIATRIST
Payer: COMMERCIAL

## 2021-08-25 DIAGNOSIS — B35.1 ONYCHOMYCOSIS: ICD-10-CM

## 2021-08-25 LAB
ALBUMIN SERPL-MCNC: 3.6 G/DL (ref 3.4–5)
ALP LIVER SERPL-CCNC: 47 U/L
ALT SERPL-CCNC: 26 U/L
AST SERPL-CCNC: 43 U/L (ref 15–37)
BILIRUB DIRECT SERPL-MCNC: 0.2 MG/DL (ref 0–0.2)
BILIRUB SERPL-MCNC: 0.5 MG/DL (ref 0.1–2)
M PROTEIN MFR SERPL ELPH: 7 G/DL (ref 6.4–8.2)

## 2021-08-25 PROCEDURE — 36415 COLL VENOUS BLD VENIPUNCTURE: CPT

## 2021-08-25 PROCEDURE — 80076 HEPATIC FUNCTION PANEL: CPT

## 2021-08-30 ENCOUNTER — OFFICE VISIT (OUTPATIENT)
Dept: PODIATRY CLINIC | Facility: CLINIC | Age: 61
End: 2021-08-30
Payer: COMMERCIAL

## 2021-08-30 DIAGNOSIS — B35.1 ONYCHOMYCOSIS: Primary | ICD-10-CM

## 2021-08-30 PROCEDURE — 99213 OFFICE O/P EST LOW 20 MIN: CPT | Performed by: PODIATRIST

## 2021-08-30 NOTE — PROGRESS NOTES
Bruna Colon is a 57year old male. Patient presents with:  Post-Op: pt states he has completed almost 60 days of lamisil.  pt had LFTs done on 8/25/21        HPI:   Patient returns to the clinic to discuss his nail therapy he is only about 3 or 4 pil • acetaminophen 500 MG Oral Tab Take 1 tablet (500 mg total) by mouth 4 (four) times daily.  120 tablet 0      Past Medical History:   Diagnosis Date   • Calculus of kidney 1980   • Cancer ([facility] [address] 2011    colon   • Carcinoid tumor     cecal carcinoid   • Ch History    Socioeconomic History      Marital status:       Spouse name: Not on file      Number of children: Not on file      Years of education: Not on file      Highest education level: Not on file    Tobacco Use      Smoking status: Never Smoker make sure it is coming back down to normal.  Patient was informed. I will see him in follow-up in 3 months    The patient indicates understanding of these issues and agrees to the plan.     Patt Diaz DPM

## 2021-09-28 ENCOUNTER — HOSPITAL ENCOUNTER (OUTPATIENT)
Dept: MRI IMAGING | Facility: HOSPITAL | Age: 61
Discharge: HOME | End: 2021-09-28
Attending: Other
Payer: COMMERCIAL

## 2021-09-28 DIAGNOSIS — Z96.641 STATUS POST TOTAL HIP REPLACEMENT, RIGHT: ICD-10-CM

## 2021-09-28 DIAGNOSIS — G35 MULTIPLE SCLEROSIS (HCC): ICD-10-CM

## 2021-09-28 DIAGNOSIS — M54.16 ACUTE RIGHT LUMBAR RADICULOPATHY: ICD-10-CM

## 2021-09-28 PROCEDURE — 72148 MRI LUMBAR SPINE W/O DYE: CPT | Performed by: PHYSICIAN ASSISTANT

## 2021-09-28 PROCEDURE — 72141 MRI NECK SPINE W/O DYE: CPT | Performed by: OTHER

## 2021-09-28 PROCEDURE — 70551 MRI BRAIN STEM W/O DYE: CPT | Performed by: OTHER

## 2021-10-18 ENCOUNTER — ORDER TRANSCRIPTION (OUTPATIENT)
Dept: PHYSICAL THERAPY | Facility: HOSPITAL | Age: 61
End: 2021-10-18

## 2021-10-18 DIAGNOSIS — R29.898 WEAKNESS OF RIGHT FOOT: ICD-10-CM

## 2021-10-18 DIAGNOSIS — M51.26 HNP (HERNIATED NUCLEUS PULPOSUS), LUMBAR: Primary | ICD-10-CM

## 2021-11-16 ENCOUNTER — TELEPHONE (OUTPATIENT)
Dept: PHYSICAL THERAPY | Facility: HOSPITAL | Age: 61
End: 2021-11-16

## 2021-11-17 ENCOUNTER — OFFICE VISIT (OUTPATIENT)
Dept: PHYSICAL THERAPY | Age: 61
End: 2021-11-17
Attending: PHYSICIAN ASSISTANT
Payer: COMMERCIAL

## 2021-11-17 DIAGNOSIS — M51.26 HNP (HERNIATED NUCLEUS PULPOSUS), LUMBAR: ICD-10-CM

## 2021-11-17 DIAGNOSIS — R29.898 WEAKNESS OF RIGHT FOOT: ICD-10-CM

## 2021-11-17 PROCEDURE — 97012 MECHANICAL TRACTION THERAPY: CPT

## 2021-11-17 PROCEDURE — 97161 PT EVAL LOW COMPLEX 20 MIN: CPT

## 2021-11-17 NOTE — PROGRESS NOTES
SPINE EVALUATION:   Referring Physician: Dr. Ras Chan  Diagnosis:  R low back pain with radiculopathy     Date of Service: 11/17/2021     PATIENT SUMMARY   Bruna Colon is a 57year old male who presents to therapy today with complaints of pain into total hip replacement  June 2020  Pt denies diplopia, dysarthria, dysphasia, dizziness, drop attacks, bowel/bladder changes, saddle anesthesia, and CHAPIS LE N/T.  R foot neuropathy due to MS  Denies any numbness or tingling into the R LE.  Pain anterior R thi slump - on the R  ASLR - on the R     Gait: pt ambulates on level ground with lack of hip flexion , pt does not lift the R to advance. Using the RW for work to sit at times, not for assist per pt.    Balance: SLS R 8, L 15    Today’s Treatment and Response: and treatment options and has agreed to actively participate in planning and for this course of care. Thank you for your referral. Please co-sign or sign and return this letter via fax as soon as possible to [phone].  If you have any questions, carson

## 2021-11-19 ENCOUNTER — OFFICE VISIT (OUTPATIENT)
Dept: PHYSICAL THERAPY | Age: 61
End: 2021-11-19
Attending: PHYSICIAN ASSISTANT
Payer: COMMERCIAL

## 2021-11-19 PROCEDURE — 97110 THERAPEUTIC EXERCISES: CPT

## 2021-11-19 NOTE — PROGRESS NOTES
Dx:   R low back pain with radiculopathy              Insurance (Authorized # of Visits):  8            Authorizing Physician: Dr. Chrissy Lo  Next MD visit: none scheduled  Fall Risk: standard         Precautions: n/a             Subjective:  Pt is feeling b

## 2021-11-24 ENCOUNTER — OFFICE VISIT (OUTPATIENT)
Dept: PHYSICAL THERAPY | Age: 61
End: 2021-11-24
Attending: PHYSICIAN ASSISTANT
Payer: COMMERCIAL

## 2021-11-24 DIAGNOSIS — M51.26 HNP (HERNIATED NUCLEUS PULPOSUS), LUMBAR: ICD-10-CM

## 2021-11-24 DIAGNOSIS — R29.898 WEAKNESS OF RIGHT FOOT: ICD-10-CM

## 2021-11-24 PROCEDURE — 97110 THERAPEUTIC EXERCISES: CPT

## 2021-11-24 NOTE — PROGRESS NOTES
Dx:   R low back pain with radiculopathy              Insurance (Authorized # of Visits):  8            Authorizing Physician: Dr. Hilda Alexis MD visit: none scheduled  Fall Risk: standard         Precautions: n/a             Subjective:   Feeling great

## 2021-11-29 ENCOUNTER — TELEPHONE (OUTPATIENT)
Dept: INTERNAL MEDICINE CLINIC | Facility: CLINIC | Age: 61
End: 2021-11-29

## 2021-11-30 ENCOUNTER — OFFICE VISIT (OUTPATIENT)
Dept: PODIATRY CLINIC | Facility: CLINIC | Age: 61
End: 2021-11-30
Payer: COMMERCIAL

## 2021-11-30 ENCOUNTER — OFFICE VISIT (OUTPATIENT)
Dept: PHYSICAL THERAPY | Age: 61
End: 2021-11-30
Attending: PHYSICIAN ASSISTANT
Payer: COMMERCIAL

## 2021-11-30 DIAGNOSIS — B35.1 DERMATOPHYTOSIS OF NAIL: ICD-10-CM

## 2021-11-30 DIAGNOSIS — B35.1 ONYCHOMYCOSIS: Primary | ICD-10-CM

## 2021-11-30 DIAGNOSIS — M51.26 HNP (HERNIATED NUCLEUS PULPOSUS), LUMBAR: ICD-10-CM

## 2021-11-30 DIAGNOSIS — R29.898 WEAKNESS OF RIGHT FOOT: ICD-10-CM

## 2021-11-30 PROCEDURE — 99213 OFFICE O/P EST LOW 20 MIN: CPT | Performed by: PODIATRIST

## 2021-11-30 PROCEDURE — 97110 THERAPEUTIC EXERCISES: CPT

## 2021-11-30 NOTE — PROGRESS NOTES
Dx:   R low back pain with radiculopathy              Insurance (Authorized # of Visits):  8            Authorizing Physician: Dr. Chrissy Lo  Next MD visit: none scheduled  Fall Risk: standard         Precautions: n/a             Subjective:   Feeling some b walking red band  bars flex and ext 10 x each   Sit to stand 10 x off plinth 2 sets  Bridge 10x B   SL only 10 x   March with bridge 10 x   HS/pirifomris stretch   SKTC   Hip flexor stretch 3 x 30 secs R   LTR 10 x                                 HEP:  Giovanny

## 2021-12-02 ENCOUNTER — OFFICE VISIT (OUTPATIENT)
Dept: PHYSICAL THERAPY | Age: 61
End: 2021-12-02
Attending: PHYSICIAN ASSISTANT
Payer: COMMERCIAL

## 2021-12-02 DIAGNOSIS — R29.898 WEAKNESS OF RIGHT FOOT: ICD-10-CM

## 2021-12-02 DIAGNOSIS — M51.26 HNP (HERNIATED NUCLEUS PULPOSUS), LUMBAR: ICD-10-CM

## 2021-12-02 PROCEDURE — 97110 THERAPEUTIC EXERCISES: CPT

## 2021-12-02 NOTE — PROGRESS NOTES
Dx:   R low back pain with radiculopathy              Insurance (Authorized # of Visits):  8            Authorizing Physician: Dr. Mohan Tatum MD visit: none scheduled  Fall Risk: standard         Precautions: n/a             Subjective:   Bring walker a holds  GTB lateral side walking  Front walking  PPT with march 10 x 2   GTB     Prone:PA glides lumbar spine grade 2-3   Bird dog 10 x 5   Bridge with ball add 10 x 2   Ball on wall squats 10 x   HS stretch 3 x 30 secs SKTC R 3 x 30 secs  nustep 5 mins LE

## 2021-12-06 ENCOUNTER — PATIENT MESSAGE (OUTPATIENT)
Dept: HEMATOLOGY/ONCOLOGY | Facility: HOSPITAL | Age: 61
End: 2021-12-06

## 2021-12-06 DIAGNOSIS — C7A.021 MALIGNANT CARCINOID TUMOR OF CECUM (HCC): Primary | ICD-10-CM

## 2021-12-06 RX ORDER — LOSARTAN POTASSIUM 100 MG/1
TABLET ORAL
Qty: 90 TABLET | Refills: 3 | Status: SHIPPED | OUTPATIENT
Start: 2021-12-06

## 2021-12-06 NOTE — PROGRESS NOTES
Petty Valencia is a 57year old male. Patient presents with:  Toenail Fungus: follow up lamisil, not currently taking oral meds, using topical.        HPI:   This pleasant gentleman returns to the clinic he has been applying topical Lamisil.   He has be 1 tablet (250 mg total) by mouth daily.  (Patient not taking: Reported on 11/30/2021) 30 tablet 0      Past Medical History:   Diagnosis Date   • Calculus of kidney 1980   • Cancer [facility]) 2011    colon   • Carcinoid tumor     cecal carcinoid   • Chest pain Socioeconomic History      Marital status:     Tobacco Use      Smoking status: Never Smoker      Smokeless tobacco: Never Used    Vaping Use      Vaping Use: Never used    Substance and Sexual Activity      Alcohol use:  Yes        Alcohol/week: 2.0

## 2021-12-06 NOTE — TELEPHONE ENCOUNTER
Last OV 5/10/21  Last PE 10/5/20  Last REFILL   Medication Quantity Refills Start End   LOSARTAN 100 MG Oral Tab 90 tablet 1 6/11/2021      Last LABS 7/20/21 lipid, cmp    Future Appointments   Date Time Provider Shena Sherman   12/7/2021 10:00 AM Jessie

## 2021-12-06 NOTE — TELEPHONE ENCOUNTER
From: Jignesh Lutz  To: Kings Gale MD  Sent: 12/6/2021 1:03 PM CST  Subject: Appointment and Lab work    Hi Dr Guero Maxwell,  This year has just gotten away from me. I need to set an appointment to see you, just routine visit.  My question is

## 2021-12-07 ENCOUNTER — OFFICE VISIT (OUTPATIENT)
Dept: PHYSICAL THERAPY | Age: 61
End: 2021-12-07
Attending: PHYSICIAN ASSISTANT
Payer: COMMERCIAL

## 2021-12-07 DIAGNOSIS — M51.26 HNP (HERNIATED NUCLEUS PULPOSUS), LUMBAR: ICD-10-CM

## 2021-12-07 DIAGNOSIS — R29.898 WEAKNESS OF RIGHT FOOT: ICD-10-CM

## 2021-12-07 PROCEDURE — 97110 THERAPEUTIC EXERCISES: CPT

## 2021-12-07 NOTE — PROGRESS NOTES
Dx:   R low back pain with radiculopathy              Insurance (Authorized # of Visits):  8            Authorizing Physician: Dr. Bryn Boyd  Next MD visit: none scheduled  Fall Risk: standard         Precautions: n/a             Subjective:  3/10 in the mid to stand 10 x off plinth 2 sets  Bridge 10x B   SL only 10 x   March with bridge 10 x   HS/pirifomris stretch   SKTC   Hip flexor stretch 3 x 30 secs R   LTR 10 x        Rocker board 10 x in both directions with HHA  MHP 10 sec stretch 3 x 30 secs   Bridge

## 2021-12-09 ENCOUNTER — OFFICE VISIT (OUTPATIENT)
Dept: PHYSICAL THERAPY | Age: 61
End: 2021-12-09
Attending: PHYSICIAN ASSISTANT
Payer: COMMERCIAL

## 2021-12-09 DIAGNOSIS — R29.898 WEAKNESS OF RIGHT FOOT: ICD-10-CM

## 2021-12-09 DIAGNOSIS — M51.26 HNP (HERNIATED NUCLEUS PULPOSUS), LUMBAR: ICD-10-CM

## 2021-12-09 PROCEDURE — 97110 THERAPEUTIC EXERCISES: CPT

## 2021-12-09 NOTE — PROGRESS NOTES
Dx:   R low back pain with radiculopathy              Insurance (Authorized # of Visits):  8            Authorizing Physician: Dr. Mike Guerrier  Next MD visit: none scheduled  Fall Risk: standard         Precautions: n/a             Subjective:  No pain, stiffn bars flex and ext 10 x each   Sit to stand 10 x off plinth 2 sets  Bridge 10x B   SL only 10 x   March with bridge 10 x   HS/pirifomris stretch   SKTC   Hip flexor stretch 3 x 30 secs R   LTR 10 x        Rocker board 10 x in both directions with HHA  MHP 1

## 2021-12-10 ENCOUNTER — LAB ENCOUNTER (OUTPATIENT)
Dept: LAB | Age: 61
End: 2021-12-10
Attending: INTERNAL MEDICINE
Payer: COMMERCIAL

## 2021-12-10 DIAGNOSIS — R79.89 ELEVATED SERUM CREATININE: ICD-10-CM

## 2021-12-10 DIAGNOSIS — C7A.021 MALIGNANT CARCINOID TUMOR OF CECUM (HCC): ICD-10-CM

## 2021-12-10 DIAGNOSIS — E78.5 DYSLIPIDEMIA: ICD-10-CM

## 2021-12-10 DIAGNOSIS — B35.1 ONYCHOMYCOSIS: ICD-10-CM

## 2021-12-10 PROCEDURE — 85025 COMPLETE CBC W/AUTO DIFF WBC: CPT

## 2021-12-10 PROCEDURE — 36415 COLL VENOUS BLD VENIPUNCTURE: CPT

## 2021-12-10 PROCEDURE — 80061 LIPID PANEL: CPT

## 2021-12-10 PROCEDURE — 86316 IMMUNOASSAY TUMOR OTHER: CPT

## 2021-12-10 PROCEDURE — 80053 COMPREHEN METABOLIC PANEL: CPT

## 2021-12-10 PROCEDURE — 82248 BILIRUBIN DIRECT: CPT

## 2021-12-13 PROBLEM — M48.061 LUMBAR FORAMINAL STENOSIS: Status: ACTIVE | Noted: 2021-12-13

## 2021-12-13 PROBLEM — M51.16 DISPLACEMENT OF LUMBAR INTERVERTEBRAL DISC WITH RADICULOPATHY: Status: ACTIVE | Noted: 2021-12-13

## 2021-12-13 PROBLEM — G89.29 CHRONIC RIGHT-SIDED LOW BACK PAIN WITH RIGHT-SIDED SCIATICA: Status: ACTIVE | Noted: 2021-12-13

## 2021-12-13 PROBLEM — M54.41 CHRONIC RIGHT-SIDED LOW BACK PAIN WITH RIGHT-SIDED SCIATICA: Status: ACTIVE | Noted: 2021-12-13

## 2021-12-14 ENCOUNTER — OFFICE VISIT (OUTPATIENT)
Dept: PHYSICAL THERAPY | Age: 61
End: 2021-12-14
Attending: PHYSICIAN ASSISTANT
Payer: COMMERCIAL

## 2021-12-14 DIAGNOSIS — R29.898 WEAKNESS OF RIGHT FOOT: ICD-10-CM

## 2021-12-14 DIAGNOSIS — M51.26 HNP (HERNIATED NUCLEUS PULPOSUS), LUMBAR: ICD-10-CM

## 2021-12-14 PROCEDURE — 97110 THERAPEUTIC EXERCISES: CPT

## 2021-12-14 NOTE — PROGRESS NOTES
Dx:   R low back pain with radiculopathy              Insurance (Authorized # of Visits):  8            Authorizing Physician: Dr. Trae Hwang  Next MD visit: none scheduled  Fall Risk: standard         Precautions: n/a             Subjective:  I saw the Pain over bosu 10 x   Lateral side walking red band  bars flex and ext 10 x each   Sit to stand 10 x off plinth 2 sets  Bridge 10x B   SL only 10 x   March with bridge 10 x   HS/pirifomris stretch   SKTC   Hip flexor stretch 3 x 30 secs R   LTR 10 x        Rock

## 2021-12-16 ENCOUNTER — OFFICE VISIT (OUTPATIENT)
Dept: PHYSICAL THERAPY | Age: 61
End: 2021-12-16
Attending: PHYSICIAN ASSISTANT
Payer: COMMERCIAL

## 2021-12-16 DIAGNOSIS — M51.26 HNP (HERNIATED NUCLEUS PULPOSUS), LUMBAR: ICD-10-CM

## 2021-12-16 DIAGNOSIS — R29.898 WEAKNESS OF RIGHT FOOT: ICD-10-CM

## 2021-12-16 PROCEDURE — 97110 THERAPEUTIC EXERCISES: CPT

## 2021-12-16 NOTE — PROGRESS NOTES
Dx:   R low back pain with radiculopathy              Insurance (Authorized # of Visits):  8            Authorizing Physician: Dr. Angel Portillo  Next MD visit: none scheduled  Fall Risk: standard         Precautions: n/a             Subjective:    Sore after th walking  Front walking  PPT with march 10 x 2   GTB     Prone:PA glides lumbar spine grade 2-3   Bird dog 10 x 5   Bridge with ball add 10 x 2   Ball on wall squats 10 x   HS stretch 3 x 30 secs SKTC R 3 x 30 secs  nustep 5 mins LE only   bosu step ups 10 trunk flex 2 x 30 secs                                 HEP:  Side lying hip and and wall slides     Charges:  EX 3       Total Timed Treatment: 45 min  Total Treatment Time: 45 min

## 2021-12-21 ENCOUNTER — TELEPHONE (OUTPATIENT)
Dept: PHYSICAL THERAPY | Facility: HOSPITAL | Age: 61
End: 2021-12-21

## 2022-01-07 DIAGNOSIS — E78.5 DYSLIPIDEMIA: ICD-10-CM

## 2022-01-07 RX ORDER — FENOFIBRATE 145 MG/1
TABLET, COATED ORAL
Qty: 90 TABLET | Refills: 0 | Status: SHIPPED | OUTPATIENT
Start: 2022-01-07

## 2022-01-15 DIAGNOSIS — E78.5 DYSLIPIDEMIA: ICD-10-CM

## 2022-01-17 RX ORDER — PRAVASTATIN SODIUM 80 MG/1
TABLET ORAL
Qty: 90 TABLET | Refills: 0 | Status: SHIPPED | OUTPATIENT
Start: 2022-01-17

## 2022-01-19 ENCOUNTER — OFFICE VISIT (OUTPATIENT)
Dept: PHYSICAL THERAPY | Age: 62
End: 2022-01-19
Attending: PHYSICIAN ASSISTANT
Payer: COMMERCIAL

## 2022-01-19 PROCEDURE — 97110 THERAPEUTIC EXERCISES: CPT

## 2022-01-19 NOTE — PROGRESS NOTES
Dx:   R low back pain with radiculopathy              Insurance (Authorized # of Visits):  8            Authorizing Physician: Dr. Bartolome John  Next MD visit: none scheduled  Fall Risk: standard         Precautions: n/a            Discharge Summary  Pt has att ball add 10 x 2   Ball on wall squats 10 x   HS stretch 3 x 30 secs SKTC R 3 x 30 secs  nustep 5 mins LE only   bosu step ups 10 x   Lateral side steps over bosu 10 x   Lateral side walking red band  bars flex and ext 10 x each   Sit to stand 10 x off plin side    Clams BTB   10 x 2   Stephen stretch   Off plinth 3 x 30 secs R  Marching with BTB PPT 10 x   10 x PPT                                       HEP:  Side lying hip and and wall slides     Charges:  EX 3       Total Timed Treatment: 45 min  Total Treat

## 2022-01-27 LAB
AMB EXT CHOL/HDL RATIO: 15
AMB EXT CMP ALT: 16 U/L
AMB EXT CMP AST: 36 U/L
AMB EXT CREATININE: 1.01 MG/DL
AMB EXT GLUCOSE: 92 MG/DL
AMB EXT HDL CHOLESTEROL: 9 MG/DL
AMB EXT HEMATOCRIT: 30.1
AMB EXT HEMOGLOBIN: 9.6
AMB EXT LDL CHOLESTEROL, DIRECT: 75 MG/DL
AMB EXT MCV: 89
AMB EXT PLATELETS: 346
AMB EXT TRIGLYCERIDES: 310 MG/DL
AMB EXT WBC: 3.9 X10(3)UL

## 2022-02-01 ENCOUNTER — OFFICE VISIT (OUTPATIENT)
Dept: INTERNAL MEDICINE CLINIC | Facility: CLINIC | Age: 62
End: 2022-02-01
Payer: COMMERCIAL

## 2022-02-01 VITALS
OXYGEN SATURATION: 99 % | BODY MASS INDEX: 24.38 KG/M2 | HEART RATE: 68 BPM | SYSTOLIC BLOOD PRESSURE: 134 MMHG | DIASTOLIC BLOOD PRESSURE: 72 MMHG | TEMPERATURE: 98 F | RESPIRATION RATE: 18 BRPM | WEIGHT: 180 LBS | HEIGHT: 72 IN

## 2022-02-01 DIAGNOSIS — G89.29 CHRONIC RIGHT-SIDED LOW BACK PAIN WITH RIGHT-SIDED SCIATICA: ICD-10-CM

## 2022-02-01 DIAGNOSIS — M51.16 DISPLACEMENT OF LUMBAR INTERVERTEBRAL DISC WITH RADICULOPATHY: ICD-10-CM

## 2022-02-01 DIAGNOSIS — M16.11 PRIMARY OSTEOARTHRITIS OF RIGHT HIP: ICD-10-CM

## 2022-02-01 DIAGNOSIS — G47.33 OSA ON CPAP: ICD-10-CM

## 2022-02-01 DIAGNOSIS — M54.41 CHRONIC RIGHT-SIDED LOW BACK PAIN WITH RIGHT-SIDED SCIATICA: ICD-10-CM

## 2022-02-01 DIAGNOSIS — D3A.021 CARCINOID TUMOR OF CECUM, UNSPECIFIED WHETHER MALIGNANT: ICD-10-CM

## 2022-02-01 DIAGNOSIS — G35 MULTIPLE SCLEROSIS (HCC): ICD-10-CM

## 2022-02-01 DIAGNOSIS — E78.5 DYSLIPIDEMIA: ICD-10-CM

## 2022-02-01 DIAGNOSIS — I10 ESSENTIAL HYPERTENSION: ICD-10-CM

## 2022-02-01 DIAGNOSIS — Z00.00 ANNUAL PHYSICAL EXAM: Primary | ICD-10-CM

## 2022-02-01 DIAGNOSIS — K43.2 INCISIONAL HERNIA, WITHOUT OBSTRUCTION OR GANGRENE: ICD-10-CM

## 2022-02-01 DIAGNOSIS — Z12.5 PROSTATE CANCER SCREENING: ICD-10-CM

## 2022-02-01 DIAGNOSIS — R73.03 PREDIABETES: ICD-10-CM

## 2022-02-01 DIAGNOSIS — Z96.641 STATUS POST RIGHT HIP REPLACEMENT: ICD-10-CM

## 2022-02-01 DIAGNOSIS — Z99.89 OSA ON CPAP: ICD-10-CM

## 2022-02-01 DIAGNOSIS — M48.061 LUMBAR FORAMINAL STENOSIS: ICD-10-CM

## 2022-02-01 PROCEDURE — 3078F DIAST BP <80 MM HG: CPT | Performed by: INTERNAL MEDICINE

## 2022-02-01 PROCEDURE — 90471 IMMUNIZATION ADMIN: CPT | Performed by: INTERNAL MEDICINE

## 2022-02-01 PROCEDURE — 3008F BODY MASS INDEX DOCD: CPT | Performed by: INTERNAL MEDICINE

## 2022-02-01 PROCEDURE — 99396 PREV VISIT EST AGE 40-64: CPT | Performed by: INTERNAL MEDICINE

## 2022-02-01 PROCEDURE — 90732 PPSV23 VACC 2 YRS+ SUBQ/IM: CPT | Performed by: INTERNAL MEDICINE

## 2022-02-01 PROCEDURE — 3075F SYST BP GE 130 - 139MM HG: CPT | Performed by: INTERNAL MEDICINE

## 2022-02-02 ENCOUNTER — TELEPHONE (OUTPATIENT)
Dept: INTERNAL MEDICINE CLINIC | Facility: CLINIC | Age: 62
End: 2022-02-02

## 2022-02-03 NOTE — TELEPHONE ENCOUNTER
Significant laboratory findings:    Mildly reduced Hb as compared with study on 12/10: Will continue to monitor: repeat in four weeks. Continue follow-up with hematology service. No other significant abnormality noted.

## 2022-02-04 NOTE — TELEPHONE ENCOUNTER
Spoke to pt. Informed him of results, to repeat CBC in 4 weeks, and continue to f/u with hematology. Pt stated understanding and agreed to plan.

## 2022-03-03 ENCOUNTER — OFFICE VISIT (OUTPATIENT)
Dept: PODIATRY CLINIC | Facility: CLINIC | Age: 62
End: 2022-03-03
Payer: COMMERCIAL

## 2022-03-03 DIAGNOSIS — B35.1 DERMATOPHYTOSIS OF NAIL: ICD-10-CM

## 2022-03-03 DIAGNOSIS — B35.1 ONYCHOMYCOSIS: Primary | ICD-10-CM

## 2022-03-03 PROCEDURE — 99213 OFFICE O/P EST LOW 20 MIN: CPT | Performed by: PODIATRIST

## 2022-04-06 RX ORDER — PRAVASTATIN SODIUM 80 MG/1
TABLET ORAL
Qty: 90 TABLET | Refills: 1 | Status: SHIPPED | OUTPATIENT
Start: 2022-04-06

## 2022-04-06 RX ORDER — FENOFIBRATE 145 MG/1
TABLET, COATED ORAL
Qty: 90 TABLET | Refills: 1 | Status: SHIPPED | OUTPATIENT
Start: 2022-04-06

## 2022-05-26 ENCOUNTER — MED REC SCAN ONLY (OUTPATIENT)
Dept: INTERNAL MEDICINE CLINIC | Facility: CLINIC | Age: 62
End: 2022-05-26

## 2022-06-20 ENCOUNTER — OFFICE VISIT (OUTPATIENT)
Dept: PODIATRY CLINIC | Facility: CLINIC | Age: 62
End: 2022-06-20
Payer: COMMERCIAL

## 2022-06-20 DIAGNOSIS — M20.42 HAMMER TOE OF LEFT FOOT: ICD-10-CM

## 2022-06-20 DIAGNOSIS — B35.1 ONYCHOMYCOSIS: Primary | ICD-10-CM

## 2022-06-20 DIAGNOSIS — L84 HELOMA DURUM: ICD-10-CM

## 2022-06-20 DIAGNOSIS — B35.1 DERMATOPHYTOSIS OF NAIL: ICD-10-CM

## 2022-06-20 PROCEDURE — 99213 OFFICE O/P EST LOW 20 MIN: CPT | Performed by: PODIATRIST

## 2022-07-07 RX ORDER — AMLODIPINE BESYLATE 10 MG/1
10 TABLET ORAL DAILY
Qty: 90 TABLET | Refills: 1 | Status: SHIPPED | OUTPATIENT
Start: 2022-07-07

## 2022-08-22 ENCOUNTER — OFFICE VISIT (OUTPATIENT)
Dept: PODIATRY CLINIC | Facility: CLINIC | Age: 62
End: 2022-08-22
Payer: COMMERCIAL

## 2022-08-22 DIAGNOSIS — B35.1 ONYCHOMYCOSIS: Primary | ICD-10-CM

## 2022-08-22 DIAGNOSIS — M20.42 HAMMER TOE OF LEFT FOOT: ICD-10-CM

## 2022-08-22 DIAGNOSIS — L84 HELOMA DURUM: ICD-10-CM

## 2022-08-22 DIAGNOSIS — B35.1 DERMATOPHYTOSIS OF NAIL: ICD-10-CM

## 2022-08-22 PROCEDURE — 99213 OFFICE O/P EST LOW 20 MIN: CPT | Performed by: PODIATRIST

## 2022-09-14 ENCOUNTER — MED REC SCAN ONLY (OUTPATIENT)
Dept: INTERNAL MEDICINE CLINIC | Facility: CLINIC | Age: 62
End: 2022-09-14

## 2022-10-07 DIAGNOSIS — E78.5 DYSLIPIDEMIA: ICD-10-CM

## 2022-10-07 RX ORDER — FENOFIBRATE 145 MG/1
TABLET, COATED ORAL
Qty: 90 TABLET | Refills: 3 | Status: SHIPPED | OUTPATIENT
Start: 2022-10-07

## 2022-10-07 RX ORDER — PRAVASTATIN SODIUM 80 MG/1
TABLET ORAL
Qty: 90 TABLET | Refills: 3 | Status: SHIPPED | OUTPATIENT
Start: 2022-10-07

## 2022-11-17 ENCOUNTER — OFFICE VISIT (OUTPATIENT)
Dept: PODIATRY CLINIC | Facility: CLINIC | Age: 62
End: 2022-11-17
Payer: COMMERCIAL

## 2022-11-17 DIAGNOSIS — B35.1 ONYCHOMYCOSIS: Primary | ICD-10-CM

## 2022-11-17 DIAGNOSIS — M20.42 HAMMER TOE OF LEFT FOOT: ICD-10-CM

## 2022-11-17 DIAGNOSIS — L84 HELOMA DURUM: ICD-10-CM

## 2022-11-17 DIAGNOSIS — B35.1 DERMATOPHYTOSIS OF NAIL: ICD-10-CM

## 2022-11-17 DIAGNOSIS — M79.675 PAIN OF TOE OF LEFT FOOT: ICD-10-CM

## 2022-11-17 PROCEDURE — 99213 OFFICE O/P EST LOW 20 MIN: CPT | Performed by: PODIATRIST

## 2023-01-06 RX ORDER — AMLODIPINE BESYLATE 10 MG/1
TABLET ORAL
Qty: 90 TABLET | Refills: 0 | Status: SHIPPED | OUTPATIENT
Start: 2023-01-06

## 2023-01-06 NOTE — TELEPHONE ENCOUNTER
Not passing protocol as no appt within last 6 mos. LOV with AD 2/1/22 for cpe. Overdue for labs. Do not see f/u mentioned in LOV note. Pended if agreeable.

## 2023-01-26 ENCOUNTER — OFFICE VISIT (OUTPATIENT)
Dept: PODIATRY CLINIC | Facility: CLINIC | Age: 63
End: 2023-01-26

## 2023-01-26 DIAGNOSIS — M20.42 HAMMER TOE OF LEFT FOOT: ICD-10-CM

## 2023-01-26 DIAGNOSIS — B35.1 DERMATOPHYTOSIS OF NAIL: ICD-10-CM

## 2023-01-26 DIAGNOSIS — B35.1 ONYCHOMYCOSIS: Primary | ICD-10-CM

## 2023-01-26 DIAGNOSIS — L84 HELOMA DURUM: ICD-10-CM

## 2023-01-26 PROCEDURE — 99213 OFFICE O/P EST LOW 20 MIN: CPT | Performed by: PODIATRIST

## 2023-01-30 RX ORDER — LOSARTAN POTASSIUM 100 MG/1
TABLET ORAL
Qty: 90 TABLET | Refills: 3 | Status: SHIPPED | OUTPATIENT
Start: 2023-01-30

## 2023-01-31 ENCOUNTER — TELEPHONE (OUTPATIENT)
Dept: INTERNAL MEDICINE CLINIC | Facility: CLINIC | Age: 63
End: 2023-01-31

## 2023-01-31 NOTE — TELEPHONE ENCOUNTER
CPE   Future Appointments   Date Time Provider Shena Whit   3/16/2023  7:20 AM Alysha Ramires MD [facility]      Informed must fast Orders to  Dl Barahona pt stated he already did blood work wants to know if he needs more done?

## 2023-03-16 ENCOUNTER — OFFICE VISIT (OUTPATIENT)
Dept: INTERNAL MEDICINE CLINIC | Facility: CLINIC | Age: 63
End: 2023-03-16
Payer: COMMERCIAL

## 2023-03-16 VITALS
HEART RATE: 84 BPM | OXYGEN SATURATION: 99 % | BODY MASS INDEX: 28.11 KG/M2 | SYSTOLIC BLOOD PRESSURE: 128 MMHG | WEIGHT: 187.63 LBS | RESPIRATION RATE: 16 BRPM | TEMPERATURE: 97 F | HEIGHT: 68.5 IN | DIASTOLIC BLOOD PRESSURE: 68 MMHG

## 2023-03-16 DIAGNOSIS — I10 BENIGN ESSENTIAL HTN: ICD-10-CM

## 2023-03-16 DIAGNOSIS — Z99.89 OSA ON CPAP: ICD-10-CM

## 2023-03-16 DIAGNOSIS — Z86.010 PERSONAL HISTORY OF COLONIC POLYPS: ICD-10-CM

## 2023-03-16 DIAGNOSIS — M51.16 DISPLACEMENT OF LUMBAR INTERVERTEBRAL DISC WITH RADICULOPATHY: ICD-10-CM

## 2023-03-16 DIAGNOSIS — E78.2 MIXED HYPERLIPIDEMIA: ICD-10-CM

## 2023-03-16 DIAGNOSIS — G35 MULTIPLE SCLEROSIS (HCC): ICD-10-CM

## 2023-03-16 DIAGNOSIS — M48.061 LUMBAR FORAMINAL STENOSIS: ICD-10-CM

## 2023-03-16 DIAGNOSIS — D3A.021 CARCINOID TUMOR OF CECUM, UNSPECIFIED WHETHER MALIGNANT: ICD-10-CM

## 2023-03-16 DIAGNOSIS — G47.33 OSA ON CPAP: ICD-10-CM

## 2023-03-16 DIAGNOSIS — G89.29 CHRONIC RIGHT-SIDED LOW BACK PAIN WITH RIGHT-SIDED SCIATICA: ICD-10-CM

## 2023-03-16 DIAGNOSIS — Z00.00 ROUTINE GENERAL MEDICAL EXAMINATION AT A HEALTH CARE FACILITY: Primary | ICD-10-CM

## 2023-03-16 DIAGNOSIS — Z12.5 PROSTATE CANCER SCREENING: ICD-10-CM

## 2023-03-16 DIAGNOSIS — M16.11 PRIMARY OSTEOARTHRITIS OF RIGHT HIP: ICD-10-CM

## 2023-03-16 DIAGNOSIS — N31.9 NEUROGENIC BLADDER: ICD-10-CM

## 2023-03-16 DIAGNOSIS — I10 ESSENTIAL HYPERTENSION: ICD-10-CM

## 2023-03-16 DIAGNOSIS — M54.41 CHRONIC RIGHT-SIDED LOW BACK PAIN WITH RIGHT-SIDED SCIATICA: ICD-10-CM

## 2023-03-16 DIAGNOSIS — K43.2 INCISIONAL HERNIA, WITHOUT OBSTRUCTION OR GANGRENE: ICD-10-CM

## 2023-03-16 DIAGNOSIS — Z96.641 STATUS POST RIGHT HIP REPLACEMENT: ICD-10-CM

## 2023-03-16 PROCEDURE — 3008F BODY MASS INDEX DOCD: CPT | Performed by: INTERNAL MEDICINE

## 2023-03-16 PROCEDURE — 3074F SYST BP LT 130 MM HG: CPT | Performed by: INTERNAL MEDICINE

## 2023-03-16 PROCEDURE — 3078F DIAST BP <80 MM HG: CPT | Performed by: INTERNAL MEDICINE

## 2023-03-16 PROCEDURE — 99396 PREV VISIT EST AGE 40-64: CPT | Performed by: INTERNAL MEDICINE

## 2023-03-16 RX ORDER — CHLORAL HYDRATE 500 MG
1 CAPSULE ORAL AS DIRECTED
COMMUNITY

## 2023-04-13 ENCOUNTER — OFFICE VISIT (OUTPATIENT)
Dept: PODIATRY CLINIC | Facility: CLINIC | Age: 63
End: 2023-04-13

## 2023-04-13 DIAGNOSIS — B35.1 ONYCHOMYCOSIS: Primary | ICD-10-CM

## 2023-04-13 DIAGNOSIS — B35.1 DERMATOPHYTOSIS OF NAIL: ICD-10-CM

## 2023-04-13 PROCEDURE — 99213 OFFICE O/P EST LOW 20 MIN: CPT | Performed by: PODIATRIST

## 2023-04-25 RX ORDER — AMLODIPINE BESYLATE 10 MG/1
TABLET ORAL
Qty: 90 TABLET | Refills: 0 | Status: SHIPPED | OUTPATIENT
Start: 2023-04-25

## 2023-05-03 ENCOUNTER — HOSPITAL ENCOUNTER (INPATIENT)
Facility: HOSPITAL | Age: 63
LOS: 3 days | Discharge: HOME | End: 2023-05-06
Attending: EMERGENCY MEDICINE | Admitting: STUDENT IN AN ORGANIZED HEALTH CARE EDUCATION/TRAINING PROGRAM
Payer: COMMERCIAL

## 2023-05-03 ENCOUNTER — APPOINTMENT (OUTPATIENT)
Dept: ULTRASOUND IMAGING | Facility: HOSPITAL | Age: 63
End: 2023-05-03
Attending: EMERGENCY MEDICINE
Payer: COMMERCIAL

## 2023-05-03 ENCOUNTER — OFFICE VISIT (OUTPATIENT)
Dept: INTERNAL MEDICINE CLINIC | Facility: CLINIC | Age: 63
End: 2023-05-03
Payer: COMMERCIAL

## 2023-05-03 ENCOUNTER — APPOINTMENT (OUTPATIENT)
Dept: MRI IMAGING | Facility: HOSPITAL | Age: 63
End: 2023-05-03
Attending: INTERNAL MEDICINE
Payer: COMMERCIAL

## 2023-05-03 VITALS
DIASTOLIC BLOOD PRESSURE: 64 MMHG | WEIGHT: 178.19 LBS | OXYGEN SATURATION: 96 % | SYSTOLIC BLOOD PRESSURE: 126 MMHG | TEMPERATURE: 98 F | HEART RATE: 94 BPM | RESPIRATION RATE: 16 BRPM | BODY MASS INDEX: 27.01 KG/M2 | HEIGHT: 68.11 IN

## 2023-05-03 DIAGNOSIS — R50.9 FEVER, UNSPECIFIED FEVER CAUSE: ICD-10-CM

## 2023-05-03 DIAGNOSIS — R11.2 NAUSEA AND VOMITING, UNSPECIFIED VOMITING TYPE: Primary | ICD-10-CM

## 2023-05-03 DIAGNOSIS — R17 SCLERAL ICTERUS: ICD-10-CM

## 2023-05-03 DIAGNOSIS — R52 PAIN: ICD-10-CM

## 2023-05-03 DIAGNOSIS — K80.31 CHOLANGITIS DUE TO BILE DUCT CALCULUS WITH OBSTRUCTION: Primary | ICD-10-CM

## 2023-05-03 LAB
ALBUMIN SERPL-MCNC: 3.3 G/DL (ref 3.4–5)
ALBUMIN/GLOB SERPL: 0.8 {RATIO} (ref 1–2)
ALP LIVER SERPL-CCNC: 106 U/L
ALT SERPL-CCNC: 97 U/L
ANION GAP SERPL CALC-SCNC: 4 MMOL/L (ref 0–18)
AST SERPL-CCNC: 210 U/L (ref 15–37)
BASOPHILS # BLD AUTO: 0.04 X10(3) UL (ref 0–0.2)
BASOPHILS NFR BLD AUTO: 0.9 %
BILIRUB SERPL-MCNC: 4.6 MG/DL (ref 0.1–2)
BILIRUB UR QL STRIP.AUTO: NEGATIVE
BUN BLD-MCNC: 15 MG/DL (ref 7–18)
CALCIUM BLD-MCNC: 9.4 MG/DL (ref 8.5–10.1)
CHLORIDE SERPL-SCNC: 102 MMOL/L (ref 98–112)
CLARITY UR REFRACT.AUTO: CLEAR
CO2 SERPL-SCNC: 28 MMOL/L (ref 21–32)
COLOR UR AUTO: YELLOW
CREAT BLD-MCNC: 1.03 MG/DL
EOSINOPHIL # BLD AUTO: 0.23 X10(3) UL (ref 0–0.7)
EOSINOPHIL NFR BLD AUTO: 5.1 %
ERYTHROCYTE [DISTWIDTH] IN BLOOD BY AUTOMATED COUNT: 15.9 %
GFR SERPLBLD BASED ON 1.73 SQ M-ARVRAT: 82 ML/MIN/1.73M2 (ref 60–?)
GLOBULIN PLAS-MCNC: 4.2 G/DL (ref 2.8–4.4)
GLUCOSE BLD-MCNC: 111 MG/DL (ref 70–99)
GLUCOSE UR STRIP.AUTO-MCNC: NEGATIVE MG/DL
HAV IGM SER QL: NONREACTIVE
HBV CORE IGM SER QL: NONREACTIVE
HBV SURFACE AG SERPL QL IA: NONREACTIVE
HCT VFR BLD AUTO: 33.7 %
HCV AB SERPL QL IA: NONREACTIVE
HGB BLD-MCNC: 11 G/DL
IMM GRANULOCYTES # BLD AUTO: 0.02 X10(3) UL (ref 0–1)
IMM GRANULOCYTES NFR BLD: 0.4 %
KETONES UR STRIP.AUTO-MCNC: NEGATIVE MG/DL
LACTATE SERPL-SCNC: 0.7 MMOL/L (ref 0.4–2)
LEUKOCYTE ESTERASE UR QL STRIP.AUTO: NEGATIVE
LIPASE SERPL-CCNC: 56 U/L (ref 13–75)
LYMPHOCYTES # BLD AUTO: 0.71 X10(3) UL (ref 1–4)
LYMPHOCYTES NFR BLD AUTO: 15.7 %
MCH RBC QN AUTO: 27.7 PG (ref 26–34)
MCHC RBC AUTO-ENTMCNC: 32.6 G/DL (ref 31–37)
MCV RBC AUTO: 84.9 FL
MONOCYTES # BLD AUTO: 0.72 X10(3) UL (ref 0.1–1)
MONOCYTES NFR BLD AUTO: 15.9 %
NEUTROPHILS # BLD AUTO: 2.81 X10 (3) UL (ref 1.5–7.7)
NEUTROPHILS # BLD AUTO: 2.81 X10(3) UL (ref 1.5–7.7)
NEUTROPHILS NFR BLD AUTO: 62 %
NITRITE UR QL STRIP.AUTO: NEGATIVE
OSMOLALITY SERPL CALC.SUM OF ELEC: 280 MOSM/KG (ref 275–295)
PH UR STRIP.AUTO: 6 [PH] (ref 5–8)
PLATELET # BLD AUTO: 288 10(3)UL (ref 150–450)
POTASSIUM SERPL-SCNC: 2.9 MMOL/L (ref 3.5–5.1)
PROT SERPL-MCNC: 7.5 G/DL (ref 6.4–8.2)
PROT UR STRIP.AUTO-MCNC: 30 MG/DL
RBC # BLD AUTO: 3.97 X10(6)UL
SODIUM SERPL-SCNC: 134 MMOL/L (ref 136–145)
SP GR UR STRIP.AUTO: 1.01 (ref 1–1.03)
UROBILINOGEN UR STRIP.AUTO-MCNC: 4 MG/DL
WBC # BLD AUTO: 4.5 X10(3) UL (ref 4–11)

## 2023-05-03 PROCEDURE — 74181 MRI ABDOMEN W/O CONTRAST: CPT | Performed by: INTERNAL MEDICINE

## 2023-05-03 PROCEDURE — 3074F SYST BP LT 130 MM HG: CPT | Performed by: NURSE PRACTITIONER

## 2023-05-03 PROCEDURE — 76376 3D RENDER W/INTRP POSTPROCES: CPT | Performed by: INTERNAL MEDICINE

## 2023-05-03 PROCEDURE — 3008F BODY MASS INDEX DOCD: CPT | Performed by: NURSE PRACTITIONER

## 2023-05-03 PROCEDURE — 99223 1ST HOSP IP/OBS HIGH 75: CPT | Performed by: INTERNAL MEDICINE

## 2023-05-03 PROCEDURE — 76700 US EXAM ABDOM COMPLETE: CPT | Performed by: EMERGENCY MEDICINE

## 2023-05-03 PROCEDURE — 99214 OFFICE O/P EST MOD 30 MIN: CPT | Performed by: NURSE PRACTITIONER

## 2023-05-03 PROCEDURE — 3078F DIAST BP <80 MM HG: CPT | Performed by: NURSE PRACTITIONER

## 2023-05-03 RX ORDER — SODIUM CHLORIDE 9 MG/ML
125 INJECTION, SOLUTION INTRAVENOUS CONTINUOUS
Status: DISCONTINUED | OUTPATIENT
Start: 2023-05-03 | End: 2023-05-06

## 2023-05-03 RX ORDER — MELATONIN
3 NIGHTLY PRN
Status: DISCONTINUED | OUTPATIENT
Start: 2023-05-03 | End: 2023-05-06

## 2023-05-03 RX ORDER — ONDANSETRON 2 MG/ML
4 INJECTION INTRAMUSCULAR; INTRAVENOUS EVERY 6 HOURS PRN
Status: DISCONTINUED | OUTPATIENT
Start: 2023-05-03 | End: 2023-05-06

## 2023-05-03 RX ORDER — POTASSIUM CHLORIDE 14.9 MG/ML
20 INJECTION INTRAVENOUS ONCE
Status: COMPLETED | OUTPATIENT
Start: 2023-05-03 | End: 2023-05-03

## 2023-05-03 RX ORDER — OXYBUTYNIN CHLORIDE 5 MG/1
10 TABLET ORAL AS NEEDED
Status: DISCONTINUED | OUTPATIENT
Start: 2023-05-03 | End: 2023-05-04

## 2023-05-03 RX ORDER — FENOFIBRATE 134 MG/1
134 CAPSULE ORAL
Status: DISCONTINUED | OUTPATIENT
Start: 2023-05-04 | End: 2023-05-06

## 2023-05-03 RX ORDER — TRAMADOL HYDROCHLORIDE 50 MG/1
50 TABLET ORAL EVERY 8 HOURS PRN
Status: DISCONTINUED | OUTPATIENT
Start: 2023-05-03 | End: 2023-05-06

## 2023-05-03 RX ORDER — FOLIC ACID 1 MG/1
1 TABLET ORAL DAILY
Status: DISCONTINUED | OUTPATIENT
Start: 2023-05-03 | End: 2023-05-06

## 2023-05-03 RX ORDER — PANTOPRAZOLE SODIUM 40 MG/1
40 TABLET, DELAYED RELEASE ORAL
Status: DISCONTINUED | OUTPATIENT
Start: 2023-05-04 | End: 2023-05-06

## 2023-05-03 RX ORDER — SENNOSIDES 8.6 MG
17.2 TABLET ORAL NIGHTLY PRN
Status: DISCONTINUED | OUTPATIENT
Start: 2023-05-03 | End: 2023-05-06

## 2023-05-03 RX ORDER — PROCHLORPERAZINE EDISYLATE 5 MG/ML
5 INJECTION INTRAMUSCULAR; INTRAVENOUS EVERY 8 HOURS PRN
Status: DISCONTINUED | OUTPATIENT
Start: 2023-05-03 | End: 2023-05-06

## 2023-05-03 RX ORDER — ACETAMINOPHEN 500 MG
500 TABLET ORAL EVERY 4 HOURS PRN
Status: DISCONTINUED | OUTPATIENT
Start: 2023-05-03 | End: 2023-05-06

## 2023-05-03 RX ORDER — AMLODIPINE BESYLATE 5 MG/1
10 TABLET ORAL DAILY
Status: DISCONTINUED | OUTPATIENT
Start: 2023-05-03 | End: 2023-05-06

## 2023-05-03 RX ORDER — SODIUM CHLORIDE, SODIUM LACTATE, POTASSIUM CHLORIDE, CALCIUM CHLORIDE 600; 310; 30; 20 MG/100ML; MG/100ML; MG/100ML; MG/100ML
INJECTION, SOLUTION INTRAVENOUS CONTINUOUS
Status: ACTIVE | OUTPATIENT
Start: 2023-05-03 | End: 2023-05-04

## 2023-05-03 RX ORDER — ONDANSETRON 2 MG/ML
4 INJECTION INTRAMUSCULAR; INTRAVENOUS EVERY 4 HOURS PRN
Status: ACTIVE | OUTPATIENT
Start: 2023-05-03 | End: 2023-05-03

## 2023-05-03 RX ORDER — BISACODYL 10 MG
10 SUPPOSITORY, RECTAL RECTAL
Status: DISCONTINUED | OUTPATIENT
Start: 2023-05-03 | End: 2023-05-06

## 2023-05-03 RX ORDER — LOSARTAN POTASSIUM 100 MG/1
100 TABLET ORAL DAILY
Status: DISCONTINUED | OUTPATIENT
Start: 2023-05-03 | End: 2023-05-06

## 2023-05-03 RX ORDER — ENOXAPARIN SODIUM 100 MG/ML
40 INJECTION SUBCUTANEOUS DAILY
Status: DISCONTINUED | OUTPATIENT
Start: 2023-05-04 | End: 2023-05-06

## 2023-05-03 RX ORDER — SODIUM PHOSPHATE, DIBASIC AND SODIUM PHOSPHATE, MONOBASIC 7; 19 G/133ML; G/133ML
1 ENEMA RECTAL ONCE AS NEEDED
Status: DISCONTINUED | OUTPATIENT
Start: 2023-05-03 | End: 2023-05-06

## 2023-05-03 RX ORDER — HYDROMORPHONE HYDROCHLORIDE 1 MG/ML
0.5 INJECTION, SOLUTION INTRAMUSCULAR; INTRAVENOUS; SUBCUTANEOUS EVERY 30 MIN PRN
Status: ACTIVE | OUTPATIENT
Start: 2023-05-03 | End: 2023-05-03

## 2023-05-03 RX ORDER — TERIFLUNOMIDE 14 MG/1
14 TABLET, FILM COATED ORAL DAILY
Status: DISCONTINUED | OUTPATIENT
Start: 2023-05-04 | End: 2023-05-06

## 2023-05-03 RX ORDER — ATORVASTATIN CALCIUM 20 MG/1
20 TABLET, FILM COATED ORAL NIGHTLY
Status: DISCONTINUED | OUTPATIENT
Start: 2023-05-03 | End: 2023-05-06

## 2023-05-03 RX ORDER — SODIUM CHLORIDE 9 MG/ML
INJECTION, SOLUTION INTRAVENOUS CONTINUOUS
Status: ACTIVE | OUTPATIENT
Start: 2023-05-03 | End: 2023-05-03

## 2023-05-03 RX ORDER — POLYETHYLENE GLYCOL 3350 17 G/17G
17 POWDER, FOR SOLUTION ORAL DAILY PRN
Status: DISCONTINUED | OUTPATIENT
Start: 2023-05-03 | End: 2023-05-06

## 2023-05-03 NOTE — ED INITIAL ASSESSMENT (HPI)
Patient to the ER c/o flu like symptoms since Monday. Patient has yellow color sclera, no liver hx. Patient has hx of MS and herniated discs. Hx of colon cancer.  Fever

## 2023-05-03 NOTE — ED QUICK NOTES
Pt resting comfortably on stretcher with wife at bedside. Pt is updated on poc, verbalized understanding.

## 2023-05-04 ENCOUNTER — APPOINTMENT (OUTPATIENT)
Dept: GENERAL RADIOLOGY | Facility: HOSPITAL | Age: 63
End: 2023-05-04
Attending: STUDENT IN AN ORGANIZED HEALTH CARE EDUCATION/TRAINING PROGRAM
Payer: COMMERCIAL

## 2023-05-04 ENCOUNTER — ANESTHESIA EVENT (OUTPATIENT)
Dept: ENDOSCOPY | Facility: HOSPITAL | Age: 63
End: 2023-05-04
Payer: COMMERCIAL

## 2023-05-04 LAB
ALBUMIN SERPL-MCNC: 2.5 G/DL (ref 3.4–5)
ALBUMIN/GLOB SERPL: 0.7 {RATIO} (ref 1–2)
ALP LIVER SERPL-CCNC: 103 U/L
ALT SERPL-CCNC: 77 U/L
ANION GAP SERPL CALC-SCNC: 4 MMOL/L (ref 0–18)
AST SERPL-CCNC: 148 U/L (ref 15–37)
BASOPHILS # BLD AUTO: 0.03 X10(3) UL (ref 0–0.2)
BASOPHILS NFR BLD AUTO: 0.7 %
BILIRUB SERPL-MCNC: 2.8 MG/DL (ref 0.1–2)
BUN BLD-MCNC: 16 MG/DL (ref 7–18)
CALCIUM BLD-MCNC: 8.4 MG/DL (ref 8.5–10.1)
CHLORIDE SERPL-SCNC: 107 MMOL/L (ref 98–112)
CO2 SERPL-SCNC: 28 MMOL/L (ref 21–32)
CREAT BLD-MCNC: 0.9 MG/DL
EOSINOPHIL # BLD AUTO: 0.32 X10(3) UL (ref 0–0.7)
EOSINOPHIL NFR BLD AUTO: 7.7 %
ERYTHROCYTE [DISTWIDTH] IN BLOOD BY AUTOMATED COUNT: 16.1 %
GFR SERPLBLD BASED ON 1.73 SQ M-ARVRAT: 96 ML/MIN/1.73M2 (ref 60–?)
GLOBULIN PLAS-MCNC: 3.6 G/DL (ref 2.8–4.4)
GLUCOSE BLD-MCNC: 111 MG/DL (ref 70–99)
HCT VFR BLD AUTO: 28.7 %
HGB BLD-MCNC: 9.4 G/DL
IMM GRANULOCYTES # BLD AUTO: 0.02 X10(3) UL (ref 0–1)
IMM GRANULOCYTES NFR BLD: 0.5 %
LYMPHOCYTES # BLD AUTO: 0.98 X10(3) UL (ref 1–4)
LYMPHOCYTES NFR BLD AUTO: 23.7 %
MCH RBC QN AUTO: 27.9 PG (ref 26–34)
MCHC RBC AUTO-ENTMCNC: 32.8 G/DL (ref 31–37)
MCV RBC AUTO: 85.2 FL
MONOCYTES # BLD AUTO: 0.67 X10(3) UL (ref 0.1–1)
MONOCYTES NFR BLD AUTO: 16.2 %
NEUTROPHILS # BLD AUTO: 2.11 X10 (3) UL (ref 1.5–7.7)
NEUTROPHILS # BLD AUTO: 2.11 X10(3) UL (ref 1.5–7.7)
NEUTROPHILS NFR BLD AUTO: 51.2 %
OSMOLALITY SERPL CALC.SUM OF ELEC: 290 MOSM/KG (ref 275–295)
PLATELET # BLD AUTO: 258 10(3)UL (ref 150–450)
POTASSIUM SERPL-SCNC: 3 MMOL/L (ref 3.5–5.1)
POTASSIUM SERPL-SCNC: 3.8 MMOL/L (ref 3.5–5.1)
PROT SERPL-MCNC: 6.1 G/DL (ref 6.4–8.2)
RBC # BLD AUTO: 3.37 X10(6)UL
SODIUM SERPL-SCNC: 139 MMOL/L (ref 136–145)
WBC # BLD AUTO: 4.1 X10(3) UL (ref 4–11)

## 2023-05-04 PROCEDURE — 99233 SBSQ HOSP IP/OBS HIGH 50: CPT | Performed by: HOSPITALIST

## 2023-05-04 RX ORDER — OXYBUTYNIN CHLORIDE 5 MG/1
10 TABLET ORAL 2 TIMES DAILY PRN
Status: DISCONTINUED | OUTPATIENT
Start: 2023-05-04 | End: 2023-05-06

## 2023-05-04 RX ORDER — OXYBUTYNIN CHLORIDE 5 MG/1
10 TABLET ORAL 2 TIMES DAILY
Status: DISCONTINUED | OUTPATIENT
Start: 2023-05-04 | End: 2023-05-04

## 2023-05-04 RX ORDER — POTASSIUM CHLORIDE 20 MEQ/1
40 TABLET, EXTENDED RELEASE ORAL EVERY 4 HOURS
Status: COMPLETED | OUTPATIENT
Start: 2023-05-04 | End: 2023-05-04

## 2023-05-04 NOTE — PLAN OF CARE
Patient admitted via cart from ED  Oriented to room. Safety precautions initiated. Bed in low position. Call light in reach. Pt a&ox4. MRI completed. Bld cx pending. Ordered iv abx for tonight. Yellow sclera noted, MD aware. GI and Gen sx to see. Patient verbalized understanding of plan of care. Safety precautions in place.

## 2023-05-04 NOTE — PLAN OF CARE
Pt A&Ox4, VSS on RA, , tele; NSR. Pt denies any pain at this time. Jaundice noted, sclera yellow. Pt denies N/V/D. Abdomen is soft and rounded, bowel sound present, tenderness noted to RUQ. Pt strict NPO, NS running at 125 mL/hr, LBM 5/3. Plan for ERCP later today, consent on chart. Plan of care reviewed with patient. Patient demonstrates understanding.

## 2023-05-05 ENCOUNTER — ANESTHESIA (OUTPATIENT)
Dept: ENDOSCOPY | Facility: HOSPITAL | Age: 63
End: 2023-05-05
Payer: COMMERCIAL

## 2023-05-05 ENCOUNTER — APPOINTMENT (OUTPATIENT)
Dept: GENERAL RADIOLOGY | Facility: HOSPITAL | Age: 63
End: 2023-05-05
Attending: INTERNAL MEDICINE
Payer: COMMERCIAL

## 2023-05-05 ENCOUNTER — ANESTHESIA EVENT (OUTPATIENT)
Dept: SURGERY | Facility: HOSPITAL | Age: 63
End: 2023-05-05
Payer: COMMERCIAL

## 2023-05-05 LAB
ALBUMIN SERPL-MCNC: 2.5 G/DL (ref 3.4–5)
ALBUMIN/GLOB SERPL: 0.8 {RATIO} (ref 1–2)
ALP LIVER SERPL-CCNC: 112 U/L
ALT SERPL-CCNC: 64 U/L
ANION GAP SERPL CALC-SCNC: 3 MMOL/L (ref 0–18)
AST SERPL-CCNC: 106 U/L (ref 15–37)
BASOPHILS # BLD AUTO: 0.05 X10(3) UL (ref 0–0.2)
BASOPHILS NFR BLD AUTO: 1.4 %
BILIRUB SERPL-MCNC: 2.1 MG/DL (ref 0.1–2)
BUN BLD-MCNC: 17 MG/DL (ref 7–18)
CALCIUM BLD-MCNC: 8.3 MG/DL (ref 8.5–10.1)
CHLORIDE SERPL-SCNC: 111 MMOL/L (ref 98–112)
CO2 SERPL-SCNC: 25 MMOL/L (ref 21–32)
CREAT BLD-MCNC: 0.93 MG/DL
EOSINOPHIL # BLD AUTO: 0.41 X10(3) UL (ref 0–0.7)
EOSINOPHIL NFR BLD AUTO: 11.5 %
ERYTHROCYTE [DISTWIDTH] IN BLOOD BY AUTOMATED COUNT: 16.4 %
GFR SERPLBLD BASED ON 1.73 SQ M-ARVRAT: 92 ML/MIN/1.73M2 (ref 60–?)
GLOBULIN PLAS-MCNC: 3.2 G/DL (ref 2.8–4.4)
GLUCOSE BLD-MCNC: 90 MG/DL (ref 70–99)
HCT VFR BLD AUTO: 30.1 %
HGB BLD-MCNC: 9.5 G/DL
IMM GRANULOCYTES # BLD AUTO: 0.02 X10(3) UL (ref 0–1)
IMM GRANULOCYTES NFR BLD: 0.6 %
LYMPHOCYTES # BLD AUTO: 1.06 X10(3) UL (ref 1–4)
LYMPHOCYTES NFR BLD AUTO: 29.8 %
MCH RBC QN AUTO: 27.7 PG (ref 26–34)
MCHC RBC AUTO-ENTMCNC: 31.6 G/DL (ref 31–37)
MCV RBC AUTO: 87.8 FL
MONOCYTES # BLD AUTO: 0.56 X10(3) UL (ref 0.1–1)
MONOCYTES NFR BLD AUTO: 15.7 %
NEUTROPHILS # BLD AUTO: 1.46 X10 (3) UL (ref 1.5–7.7)
NEUTROPHILS # BLD AUTO: 1.46 X10(3) UL (ref 1.5–7.7)
NEUTROPHILS NFR BLD AUTO: 41 %
OSMOLALITY SERPL CALC.SUM OF ELEC: 289 MOSM/KG (ref 275–295)
PLATELET # BLD AUTO: 272 10(3)UL (ref 150–450)
POTASSIUM SERPL-SCNC: 3.6 MMOL/L (ref 3.5–5.1)
PROT SERPL-MCNC: 5.7 G/DL (ref 6.4–8.2)
RBC # BLD AUTO: 3.43 X10(6)UL
SODIUM SERPL-SCNC: 139 MMOL/L (ref 136–145)
WBC # BLD AUTO: 3.6 X10(3) UL (ref 4–11)

## 2023-05-05 PROCEDURE — 0DJD8ZZ INSPECTION OF LOWER INTESTINAL TRACT, VIA NATURAL OR ARTIFICIAL OPENING ENDOSCOPIC: ICD-10-PCS | Performed by: INTERNAL MEDICINE

## 2023-05-05 PROCEDURE — 99232 SBSQ HOSP IP/OBS MODERATE 35: CPT | Performed by: HOSPITALIST

## 2023-05-05 PROCEDURE — 74328 X-RAY BILE DUCT ENDOSCOPY: CPT | Performed by: INTERNAL MEDICINE

## 2023-05-05 PROCEDURE — 0FC98ZZ EXTIRPATION OF MATTER FROM COMMON BILE DUCT, VIA NATURAL OR ARTIFICIAL OPENING ENDOSCOPIC: ICD-10-PCS | Performed by: INTERNAL MEDICINE

## 2023-05-05 RX ORDER — NICOTINE POLACRILEX 4 MG
30 LOZENGE BUCCAL
Status: DISCONTINUED | OUTPATIENT
Start: 2023-05-05 | End: 2023-05-05 | Stop reason: HOSPADM

## 2023-05-05 RX ORDER — HYDROMORPHONE HYDROCHLORIDE 1 MG/ML
0.4 INJECTION, SOLUTION INTRAMUSCULAR; INTRAVENOUS; SUBCUTANEOUS EVERY 5 MIN PRN
Status: DISCONTINUED | OUTPATIENT
Start: 2023-05-05 | End: 2023-05-05 | Stop reason: HOSPADM

## 2023-05-05 RX ORDER — METOCLOPRAMIDE HYDROCHLORIDE 5 MG/ML
10 INJECTION INTRAMUSCULAR; INTRAVENOUS AS NEEDED
Status: DISCONTINUED | OUTPATIENT
Start: 2023-05-05 | End: 2023-05-05 | Stop reason: HOSPADM

## 2023-05-05 RX ORDER — LIDOCAINE HYDROCHLORIDE 10 MG/ML
INJECTION, SOLUTION EPIDURAL; INFILTRATION; INTRACAUDAL; PERINEURAL AS NEEDED
Status: DISCONTINUED | OUTPATIENT
Start: 2023-05-05 | End: 2023-05-05 | Stop reason: SURG

## 2023-05-05 RX ORDER — NALOXONE HYDROCHLORIDE 0.4 MG/ML
80 INJECTION, SOLUTION INTRAMUSCULAR; INTRAVENOUS; SUBCUTANEOUS AS NEEDED
Status: DISCONTINUED | OUTPATIENT
Start: 2023-05-05 | End: 2023-05-05 | Stop reason: HOSPADM

## 2023-05-05 RX ORDER — SODIUM CHLORIDE, SODIUM LACTATE, POTASSIUM CHLORIDE, CALCIUM CHLORIDE 600; 310; 30; 20 MG/100ML; MG/100ML; MG/100ML; MG/100ML
INJECTION, SOLUTION INTRAVENOUS CONTINUOUS
Status: DISCONTINUED | OUTPATIENT
Start: 2023-05-05 | End: 2023-05-06

## 2023-05-05 RX ORDER — GLYCOPYRROLATE 0.2 MG/ML
INJECTION, SOLUTION INTRAMUSCULAR; INTRAVENOUS AS NEEDED
Status: DISCONTINUED | OUTPATIENT
Start: 2023-05-05 | End: 2023-05-05 | Stop reason: SURG

## 2023-05-05 RX ORDER — ONDANSETRON 2 MG/ML
4 INJECTION INTRAMUSCULAR; INTRAVENOUS AS NEEDED
Status: DISCONTINUED | OUTPATIENT
Start: 2023-05-05 | End: 2023-05-05 | Stop reason: HOSPADM

## 2023-05-05 RX ORDER — NICOTINE POLACRILEX 4 MG
15 LOZENGE BUCCAL
Status: DISCONTINUED | OUTPATIENT
Start: 2023-05-05 | End: 2023-05-05 | Stop reason: HOSPADM

## 2023-05-05 RX ORDER — DEXTROSE MONOHYDRATE 25 G/50ML
50 INJECTION, SOLUTION INTRAVENOUS
Status: DISCONTINUED | OUTPATIENT
Start: 2023-05-05 | End: 2023-05-05 | Stop reason: HOSPADM

## 2023-05-05 RX ADMIN — GLYCOPYRROLATE 0.4 MG: 0.2 INJECTION, SOLUTION INTRAMUSCULAR; INTRAVENOUS at 08:23:00

## 2023-05-05 RX ADMIN — SODIUM CHLORIDE: 9 INJECTION, SOLUTION INTRAVENOUS at 09:19:00

## 2023-05-05 RX ADMIN — LIDOCAINE HYDROCHLORIDE 50 MG: 10 INJECTION, SOLUTION EPIDURAL; INFILTRATION; INTRACAUDAL; PERINEURAL at 08:25:00

## 2023-05-05 RX ADMIN — LIDOCAINE HYDROCHLORIDE 50 MG: 10 INJECTION, SOLUTION EPIDURAL; INFILTRATION; INTRACAUDAL; PERINEURAL at 08:24:00

## 2023-05-05 RX ADMIN — SODIUM CHLORIDE: 9 INJECTION, SOLUTION INTRAVENOUS at 08:23:00

## 2023-05-05 NOTE — ANESTHESIA POSTPROCEDURE EVALUATION
[address] Patient Status:  Inpatient   Age/Gender 60year old male MRN [id number]   Location [address] [address] Attending Dale Johnson MD   Hosp Day # 2 PCP Ladarius Singh MD       Anesthesia Post-op Note    ENDOSCOPIC ULTRASOUND (EUS) and ENDOSCOPIC RETROGRADE CHOLANGIOPANCREATOGRAPHY with sphincterotomy and balloon sweep    Procedure Summary     Date: 05/05/23 Room / Location: [facility]4 [facility] ENDOSCOPY 01 / 1404 [facility] ENDOSCOPY    Anesthesia Start: 5378 Anesthesia Stop: 0155    Procedures:       ENDOSCOPIC ULTRASOUND (EUS) and ENDOSCOPIC RETROGRADE CHOLANGIOPANCREATOGRAPHY with sphincterotomy and balloon sweep      ENDOSCOPIC RETROGRADE CHOLANGIOPANCREATOGRAPHY (ERCP) Diagnosis: (Choledcolithiasis)    Surgeons: Porfirio Gardner DO Anesthesiologist: Yahaira Malave MD    Anesthesia Type: MAC ASA Status: 3          Anesthesia Type: MAC    Vitals Value Taken Time   /57 05/05/23 0919   Temp  05/05/23 0920   Pulse 84 05/05/23 0919   Resp 14 05/05/23 0919   SpO2 97 % 05/05/23 0919       Patient Location: Same Day Surgery    Anesthesia Type: MAC    Airway Patency: patent    Postop Pain Control: adequate    Mental Status: mildly sedated but able to meaningfully participate in the post-anesthesia evaluation    Nausea/Vomiting: none    Cardiopulmonary/Hydration status: stable euvolemic    Complications: no apparent anesthesia related complications    Postop vital signs: stable    Comments:  Cable monitors were removed in the procedure room, and the patient was transported with observation to the recovery area with the procedure team.      Report to [address] Berto Healy RN. Patient with good airway. The patient was responsive in a meaningful way and demonstrated a good airway. PACU monitors were then applied with device connection to Epic.   Full report signout, including report, identifications, history, procedure, anesthesia course, recovery expectations with chance for questions was provided to a responsible recovery RN. Cable monitors were disconnected and the patient was personally transported with observation to the recovery area. The patient was responsive in a meaningful way and demonstrated a good airway. Full report, identifications, history, procedure, anesthesia course, recovery expectations with chance for questions was provided to a responsible recovery RN. Cable monitors were removed in the procedure room, and the patient was transported with observation to the recovery area with the procedure team.          Dental Exam: Unchanged from Preop    Patient to be discharged from PACU when criteria met.

## 2023-05-05 NOTE — INTERVAL H&P NOTE
Pre-op Diagnosis: elevated LFTs    The above referenced H&P was reviewed by Lala Hooker DO on 5/5/2023, the patient was examined and no significant changes have occurred in the patient's condition since the H&P was performed. I discussed with the patient and/or legal representative the potential benefits, risks and side effects of this procedure; the likelihood of the patient achieving goals; and potential problems that might occur during recuperation. I discussed reasonable alternatives to the procedure, including risks, benefits and side effects related to the alternatives and risks related to not receiving this procedure. We will proceed with procedure as planned.

## 2023-05-05 NOTE — PLAN OF CARE
Pt back on unit at 1000. Pt A&Ox4, VSS on RA, , tele; NSR. Pt denies any pain at this time. Jaundice noted, sclera yellow. Pt denies N/V/D at this time Abdomen is soft and rounded, bowel sound present. Pt to try clear liquid diet, DTV 1600, LBM 5/5. Plan to advance diet as tolerated and surgery tomorrow. Plan of care reviewed with patient. Patient demonstrates understanding.

## 2023-05-05 NOTE — PLAN OF CARE
A&Ox4. VSS on room air. Tele- NSR. Reports no pain. Yellow sclera noted. Ordered iv abx for tonight. Bld cx pending. Denies nausea/vomiting. Plan for ERCP and abd endoscopic ultrasound tomorrow. Discussed plan of care with patient. Safety precautions in place.

## 2023-05-06 ENCOUNTER — APPOINTMENT (OUTPATIENT)
Dept: GENERAL RADIOLOGY | Facility: HOSPITAL | Age: 63
End: 2023-05-06
Attending: STUDENT IN AN ORGANIZED HEALTH CARE EDUCATION/TRAINING PROGRAM
Payer: COMMERCIAL

## 2023-05-06 ENCOUNTER — ANESTHESIA (OUTPATIENT)
Dept: SURGERY | Facility: HOSPITAL | Age: 63
End: 2023-05-06
Payer: COMMERCIAL

## 2023-05-06 VITALS
TEMPERATURE: 99 F | BODY MASS INDEX: 27 KG/M2 | WEIGHT: 177.94 LBS | OXYGEN SATURATION: 96 % | HEART RATE: 86 BPM | DIASTOLIC BLOOD PRESSURE: 67 MMHG | RESPIRATION RATE: 22 BRPM | SYSTOLIC BLOOD PRESSURE: 124 MMHG

## 2023-05-06 PROCEDURE — 74300 X-RAY BILE DUCTS/PANCREAS: CPT | Performed by: STUDENT IN AN ORGANIZED HEALTH CARE EDUCATION/TRAINING PROGRAM

## 2023-05-06 PROCEDURE — BF101ZZ FLUOROSCOPY OF BILE DUCTS USING LOW OSMOLAR CONTRAST: ICD-10-PCS | Performed by: STUDENT IN AN ORGANIZED HEALTH CARE EDUCATION/TRAINING PROGRAM

## 2023-05-06 PROCEDURE — 0FT44ZZ RESECTION OF GALLBLADDER, PERCUTANEOUS ENDOSCOPIC APPROACH: ICD-10-PCS | Performed by: STUDENT IN AN ORGANIZED HEALTH CARE EDUCATION/TRAINING PROGRAM

## 2023-05-06 PROCEDURE — 47563 LAPARO CHOLECYSTECTOMY/GRAPH: CPT | Performed by: PHYSICIAN ASSISTANT

## 2023-05-06 PROCEDURE — 47563 LAPARO CHOLECYSTECTOMY/GRAPH: CPT | Performed by: STUDENT IN AN ORGANIZED HEALTH CARE EDUCATION/TRAINING PROGRAM

## 2023-05-06 PROCEDURE — 8E0W4CZ ROBOTIC ASSISTED PROCEDURE OF TRUNK REGION, PERCUTANEOUS ENDOSCOPIC APPROACH: ICD-10-PCS | Performed by: STUDENT IN AN ORGANIZED HEALTH CARE EDUCATION/TRAINING PROGRAM

## 2023-05-06 PROCEDURE — 99239 HOSP IP/OBS DSCHRG MGMT >30: CPT | Performed by: HOSPITALIST

## 2023-05-06 RX ORDER — ACETAMINOPHEN 500 MG
1000 TABLET ORAL EVERY 8 HOURS PRN
Status: DISCONTINUED | OUTPATIENT
Start: 2023-05-06 | End: 2023-05-06

## 2023-05-06 RX ORDER — PROCHLORPERAZINE EDISYLATE 5 MG/ML
5 INJECTION INTRAMUSCULAR; INTRAVENOUS EVERY 8 HOURS PRN
Status: DISCONTINUED | OUTPATIENT
Start: 2023-05-06 | End: 2023-05-06 | Stop reason: HOSPADM

## 2023-05-06 RX ORDER — LABETALOL HYDROCHLORIDE 5 MG/ML
INJECTION, SOLUTION INTRAVENOUS AS NEEDED
Status: DISCONTINUED | OUTPATIENT
Start: 2023-05-06 | End: 2023-05-06 | Stop reason: SURG

## 2023-05-06 RX ORDER — NALOXONE HYDROCHLORIDE 0.4 MG/ML
80 INJECTION, SOLUTION INTRAMUSCULAR; INTRAVENOUS; SUBCUTANEOUS AS NEEDED
Status: DISCONTINUED | OUTPATIENT
Start: 2023-05-06 | End: 2023-05-06 | Stop reason: HOSPADM

## 2023-05-06 RX ORDER — DEXTROSE MONOHYDRATE 25 G/50ML
50 INJECTION, SOLUTION INTRAVENOUS
Status: DISCONTINUED | OUTPATIENT
Start: 2023-05-06 | End: 2023-05-06 | Stop reason: HOSPADM

## 2023-05-06 RX ORDER — HYDROMORPHONE HYDROCHLORIDE 1 MG/ML
0.2 INJECTION, SOLUTION INTRAMUSCULAR; INTRAVENOUS; SUBCUTANEOUS EVERY 5 MIN PRN
Status: DISCONTINUED | OUTPATIENT
Start: 2023-05-06 | End: 2023-05-06 | Stop reason: HOSPADM

## 2023-05-06 RX ORDER — NICOTINE POLACRILEX 4 MG
15 LOZENGE BUCCAL
Status: DISCONTINUED | OUTPATIENT
Start: 2023-05-06 | End: 2023-05-06 | Stop reason: HOSPADM

## 2023-05-06 RX ORDER — KETOROLAC TROMETHAMINE 30 MG/ML
INJECTION, SOLUTION INTRAMUSCULAR; INTRAVENOUS AS NEEDED
Status: DISCONTINUED | OUTPATIENT
Start: 2023-05-06 | End: 2023-05-06 | Stop reason: SURG

## 2023-05-06 RX ORDER — BUPIVACAINE HYDROCHLORIDE AND EPINEPHRINE 5; 5 MG/ML; UG/ML
INJECTION, SOLUTION EPIDURAL; INTRACAUDAL; PERINEURAL AS NEEDED
Status: DISCONTINUED | OUTPATIENT
Start: 2023-05-06 | End: 2023-05-06

## 2023-05-06 RX ORDER — MEPERIDINE HYDROCHLORIDE 25 MG/ML
12.5 INJECTION INTRAMUSCULAR; INTRAVENOUS; SUBCUTANEOUS AS NEEDED
Status: DISCONTINUED | OUTPATIENT
Start: 2023-05-06 | End: 2023-05-06 | Stop reason: HOSPADM

## 2023-05-06 RX ORDER — OXYCODONE HYDROCHLORIDE 5 MG/1
5 TABLET ORAL EVERY 6 HOURS PRN
Qty: 15 TABLET | Refills: 0 | Status: SHIPPED | OUTPATIENT
Start: 2023-05-06

## 2023-05-06 RX ORDER — OXYCODONE HYDROCHLORIDE 5 MG/1
5 TABLET ORAL EVERY 4 HOURS PRN
Status: DISCONTINUED | OUTPATIENT
Start: 2023-05-06 | End: 2023-05-06

## 2023-05-06 RX ORDER — OXYCODONE HYDROCHLORIDE 10 MG/1
10 TABLET ORAL EVERY 4 HOURS PRN
Status: DISCONTINUED | OUTPATIENT
Start: 2023-05-06 | End: 2023-05-06

## 2023-05-06 RX ORDER — SODIUM CHLORIDE, SODIUM LACTATE, POTASSIUM CHLORIDE, CALCIUM CHLORIDE 600; 310; 30; 20 MG/100ML; MG/100ML; MG/100ML; MG/100ML
INJECTION, SOLUTION INTRAVENOUS CONTINUOUS
Status: DISCONTINUED | OUTPATIENT
Start: 2023-05-06 | End: 2023-05-06 | Stop reason: HOSPADM

## 2023-05-06 RX ORDER — NICOTINE POLACRILEX 4 MG
30 LOZENGE BUCCAL
Status: DISCONTINUED | OUTPATIENT
Start: 2023-05-06 | End: 2023-05-06 | Stop reason: HOSPADM

## 2023-05-06 RX ORDER — OXYCODONE HYDROCHLORIDE 5 MG/1
2.5 TABLET ORAL EVERY 4 HOURS PRN
Status: DISCONTINUED | OUTPATIENT
Start: 2023-05-06 | End: 2023-05-06

## 2023-05-06 RX ORDER — MIDAZOLAM HYDROCHLORIDE 1 MG/ML
INJECTION INTRAMUSCULAR; INTRAVENOUS AS NEEDED
Status: DISCONTINUED | OUTPATIENT
Start: 2023-05-06 | End: 2023-05-06 | Stop reason: SURG

## 2023-05-06 RX ORDER — ONDANSETRON 2 MG/ML
4 INJECTION INTRAMUSCULAR; INTRAVENOUS EVERY 6 HOURS PRN
Status: DISCONTINUED | OUTPATIENT
Start: 2023-05-06 | End: 2023-05-06 | Stop reason: HOSPADM

## 2023-05-06 RX ORDER — HYDROMORPHONE HYDROCHLORIDE 1 MG/ML
0.4 INJECTION, SOLUTION INTRAMUSCULAR; INTRAVENOUS; SUBCUTANEOUS EVERY 5 MIN PRN
Status: DISCONTINUED | OUTPATIENT
Start: 2023-05-06 | End: 2023-05-06 | Stop reason: HOSPADM

## 2023-05-06 RX ORDER — PHENYLEPHRINE HCL 10 MG/ML
VIAL (ML) INJECTION AS NEEDED
Status: DISCONTINUED | OUTPATIENT
Start: 2023-05-06 | End: 2023-05-06 | Stop reason: SURG

## 2023-05-06 RX ORDER — HYDROMORPHONE HYDROCHLORIDE 1 MG/ML
0.6 INJECTION, SOLUTION INTRAMUSCULAR; INTRAVENOUS; SUBCUTANEOUS EVERY 5 MIN PRN
Status: DISCONTINUED | OUTPATIENT
Start: 2023-05-06 | End: 2023-05-06 | Stop reason: HOSPADM

## 2023-05-06 RX ORDER — DIPHENHYDRAMINE HYDROCHLORIDE 50 MG/ML
12.5 INJECTION INTRAMUSCULAR; INTRAVENOUS AS NEEDED
Status: DISCONTINUED | OUTPATIENT
Start: 2023-05-06 | End: 2023-05-06 | Stop reason: HOSPADM

## 2023-05-06 RX ORDER — DEXAMETHASONE SODIUM PHOSPHATE 4 MG/ML
VIAL (ML) INJECTION AS NEEDED
Status: DISCONTINUED | OUTPATIENT
Start: 2023-05-06 | End: 2023-05-06 | Stop reason: SURG

## 2023-05-06 RX ORDER — MIDAZOLAM HYDROCHLORIDE 1 MG/ML
1 INJECTION INTRAMUSCULAR; INTRAVENOUS EVERY 5 MIN PRN
Status: DISCONTINUED | OUTPATIENT
Start: 2023-05-06 | End: 2023-05-06 | Stop reason: HOSPADM

## 2023-05-06 RX ORDER — ROCURONIUM BROMIDE 10 MG/ML
INJECTION, SOLUTION INTRAVENOUS AS NEEDED
Status: DISCONTINUED | OUTPATIENT
Start: 2023-05-06 | End: 2023-05-06 | Stop reason: SURG

## 2023-05-06 RX ADMIN — PHENYLEPHRINE HCL 100 MCG: 10 MG/ML VIAL (ML) INJECTION at 09:08:00

## 2023-05-06 RX ADMIN — LABETALOL HYDROCHLORIDE 10 MG: 5 INJECTION, SOLUTION INTRAVENOUS at 08:41:00

## 2023-05-06 RX ADMIN — MIDAZOLAM HYDROCHLORIDE 2 MG: 1 INJECTION INTRAMUSCULAR; INTRAVENOUS at 07:47:00

## 2023-05-06 RX ADMIN — DEXAMETHASONE SODIUM PHOSPHATE 8 MG: 4 MG/ML VIAL (ML) INJECTION at 08:20:00

## 2023-05-06 RX ADMIN — SODIUM CHLORIDE: 9 INJECTION, SOLUTION INTRAVENOUS at 07:45:00

## 2023-05-06 RX ADMIN — PHENYLEPHRINE HCL 100 MCG: 10 MG/ML VIAL (ML) INJECTION at 08:15:00

## 2023-05-06 RX ADMIN — ROCURONIUM BROMIDE 50 MG: 10 INJECTION, SOLUTION INTRAVENOUS at 07:53:00

## 2023-05-06 RX ADMIN — ONDANSETRON 4 MG: 2 INJECTION INTRAMUSCULAR; INTRAVENOUS at 09:15:00

## 2023-05-06 RX ADMIN — SODIUM CHLORIDE: 9 INJECTION, SOLUTION INTRAVENOUS at 09:35:00

## 2023-05-06 RX ADMIN — KETOROLAC TROMETHAMINE 30 MG: 30 INJECTION, SOLUTION INTRAMUSCULAR; INTRAVENOUS at 09:35:00

## 2023-05-06 NOTE — PLAN OF CARE
A&Ox4 VSS on RA. Advancing diet as tolerated, Cardiac telemetry, IS encouraged, SCDs, ambulating well with SBA. POD#0 Lap Catherine. POC discussed, patient verbalized understanding. Family at bedside. No further questions at this time. Call light within reach.

## 2023-05-06 NOTE — ANESTHESIA PROCEDURE NOTES
Airway  Date/Time: 5/6/2023 7:55 AM  Urgency: elective      General Information and Staff    Patient location during procedure: OR  Anesthesiologist: Juve Hua MD  Performed: anesthesiologist   Performed by: Juve Hua MD  Authorized by: Juve Hua MD      Indications and Patient Condition  Indications for airway management: anesthesia  Spontaneous ventilation: present  Sedation level: deep  Preoxygenated: yes  Patient position: sniffing  Mask difficulty assessment: 1 - vent by mask    Final Airway Details  Final airway type: endotracheal airway      Successful airway: ETT  Cuffed: yes   Successful intubation technique: Video laryngoscopy  Facilitating devices/methods: intubating stylet  Endotracheal tube insertion site: oral  Blade: Kaye  Blade size: #3  ETT size (mm): 7.5    Cormack-Lehane Classification: grade IIA - partial view of glottis  Placement verified by: chest auscultation and capnometry   Measured from: lips  ETT to lips (cm): 23  Number of attempts at approach: 1  Number of other approaches attempted: 0

## 2023-05-06 NOTE — ANESTHESIA POSTPROCEDURE EVALUATION
[address] Patient Status:  Inpatient   Age/Gender 60year old male MRN [id number]   [facility] SURGERY Attending Eveline Herr MD   Hosp Day # 3 PCP Kelsi Neumann MD       Anesthesia Post-op Note    XI ROBOT-ASSISTED LAPAROSCOPIC CHOLECYSTECTOMY INTRAOPERTIVE CHOLANGIOGRAM    Procedure Summary     Date: 05/06/23 Room / Location: [facility] MAIN OR 08 / [facility] MAIN OR    Anesthesia Start: [address] Anesthesia Stop:     Procedure: XI ROBOT-ASSISTED LAPAROSCOPIC CHOLECYSTECTOMY INTRAOPERTIVE CHOLANGIOGRAM (Abdomen) Diagnosis:       Pain      (Pain [R52])    Surgeons: Namrata Sr MD Anesthesiologist: Marlen Campos MD    Anesthesia Type: general ASA Status: 3          Anesthesia Type: general    Vitals Value Taken Time   /74 05/06/23 0949   Temp 98.7 05/06/23 0949   Pulse 76 05/06/23 0949   Resp 22 05/06/23 0949   SpO2 94 05/06/23 0949       Patient Location: PACU    Anesthesia Type: general    Airway Patency: patent and extubated    Postop Pain Control: adequate    Mental Status: mildly sedated but able to meaningfully participate in the post-anesthesia evaluation    Nausea/Vomiting: none    Cardiopulmonary/Hydration status: stable euvolemic    Complications: no apparent anesthesia related complications    Postop vital signs: stable    Dental Exam: Unchanged from Preop    Patient to be discharged from PACU when criteria met.

## 2023-05-06 NOTE — OPERATIVE REPORT
[facility]  Operative Note    Chico Chavarria Location: OR   CSN [id number] MRN [id number]    1960 Age 60year old   Admission Date 5/3/2023 Operation Date 2023   Attending Physician Dinesh Rubio MD Operating Physician Cornelius Grayson MD   PCP Kerrie Clark MD        Patient Name: Chico Chavarria    Preoperative Diagnosis: Choledocholithiasis and cholelithiasis     Postoperative Diagnosis:   1. Same as pre operative diagnosis       Primary Surgeon: Cornelius Grayson MD     Assistant: Carlotta DC    Anesthesia: General    Procedures:   1. Robot-assisted Laparoscopic Cholecystectomy with intraoperative cholangiogram    Implants: None    Specimen: Gallbladder    Drains: None    Estimated Blood Loss: 10 cc    Complications: none    Condition: Good    Indications for Surgery:   Chico Chavarria is a 60year old male who presents with progressive epigastric and right upper quadrant abdominal pain. Work-up revealed choledocholethiasis and cholelithiasis. The patient presents today for laparoscopic cholecystectomy. Surgical Findings:   Chronic cholecystitis    Description of Procedure: The patient was transported to the operating room and placed on the operating table in supine position. General endotracheal anesthesia was administered. Preoperative antibiotics were given. The abdomen was prepped and draped in sterile fashion. A time-out was performed. The patient was introduced in the left upper quadrant Franklin's point. Pneumoperitoneum established to a pressure of 15 mmHg. A transverse supraumbilical incision and a 8 mm robotic optical trocar was placed through this incision under direct vision with the laparoscopeGloria Mckeon Upon initial inspection of the abdominal cavity there was no injury from our entry. There was no unexpected abnormality of the visible abdominal organs.   3 additional 8 mm trochars were placed in the right lateral abdomen and left lateral abdomen at the level of the umbilicus and one at the left upper quadrant. Patient was placed in reverse Trendelenburg position and left lateral decubitus. The robot was docked. Instruments were placed under direct visualization. Attention was turned to the right upper quadrant. The gallbladder dome was grasped and elevated,. The infundibulum was retracted. Indocyanine green had been injected preoperatively and firefly was used to confirm the location of the cystic duct and common bile duct. Dissection was initiated at the triangle of Calot. After clearing the peritoneum and connective tissue the cystic duct and cystic artery were identified superior to the line of Rouviere. The infundibulum was dissected away from the liver bed. The critical view of safety was obtained. Next, a clip was placed on the specimen side of the cystic duct. A cystic ductotomy was created. An angiocatheter was placed through the right upper quadrant abdominal wall through which a cholangiocatheter was introduced. The catheter was placed into the cystic duct. A cholangiogram was performed. The cholangiogram reveals brisk and immediate contrast excretion into the duodenum. The cystic duct, common bile duct, common hepatic duct, Right and left hepatic ducts, and intrahepatic biliary radicals were all free of lesions, stenoses, strictures, filling defects, or other abnormalities. Representative images were submitted to the radiologist. The cholangiocatheter was removed, two clips were placed on the patient's side of the cystic duct, and the duct was divided. Next, one clip was placed on the cystic artery on the specimen side, two towards the patient side, and the cystic artery was divided with the Endoshears. The gallbladder was elevated from the gallbladder fossa using electrocautery. Once the gallbladder was dissected from the gallbladder fossa it was placed in an endocatch specimen bag and set aside.    Hemostasis on the gallbladder fossa was achieved with electrocautery. The right upper quadrant was then irrigated until the effluent fluid was clear. The previously placed clips on the cystic duct and cystic artery were visualized and inspected; they were well approximated, well situated, and there was no evidence of active bleeding or bile leak. The specimen was then extracted from the umbilical trocar site. The fascia at the umbilicus was reapproximated using #1 PDS suture. Pneumoperitoneum was released and trocars removed. All skin incisions were cleansed, irrigated, and injected with local anesthetic. Skin incisions were reapproximated using subcuticular 4-0 Monocryl suture. Steri-Strips and Mastisol were used to seal all the incisions. The patient was awakened from anesthesia and brought to the recovery room in good condition. The patient tolerated the procedure well without apparent complication. All sponge, needle, and instrument counts were correct at the end of the case.     Marti Waite MD  5/6/2023  9:55 AM

## 2023-05-06 NOTE — PROGRESS NOTES
Patient arrived back on the floor from PACU. Oriented to room. Safety precautions initiated. Bed in low position. Call light in reach.

## 2023-05-06 NOTE — ANESTHESIA PROCEDURE NOTES
Peripheral IV  Date/Time: 5/6/2023 8:00 AM  Inserted by: Katie Kelley MD    Placement  Needle size: 18 G  Laterality: right  Location: forearm  Local anesthetic: none  Site prep: chlorhexidine and alcohol  Attempts: 1

## 2023-05-07 NOTE — PROGRESS NOTES
Pt home medication from pyxis bin returned to pt. Paperwork given and discussed with pt. IV's discontinued. All belongings gathered and given to pt. Transported down to car via wheelchair, going home with wife.

## 2023-05-08 ENCOUNTER — PATIENT OUTREACH (OUTPATIENT)
Dept: CASE MANAGEMENT | Age: 63
End: 2023-05-08

## 2023-05-08 DIAGNOSIS — K80.31 CHOLANGITIS DUE TO BILE DUCT CALCULUS WITH OBSTRUCTION: ICD-10-CM

## 2023-05-08 DIAGNOSIS — Z02.9 ENCOUNTERS FOR UNSPECIFIED ADMINISTRATIVE PURPOSE: ICD-10-CM

## 2023-05-23 ENCOUNTER — OFFICE VISIT (OUTPATIENT)
Facility: LOCATION | Age: 63
End: 2023-05-23

## 2023-05-23 DIAGNOSIS — Z98.890 POST-OPERATIVE STATE: ICD-10-CM

## 2023-05-23 DIAGNOSIS — Z90.49 STATUS POST LAPAROSCOPIC CHOLECYSTECTOMY: Primary | ICD-10-CM

## 2023-05-23 PROCEDURE — 99024 POSTOP FOLLOW-UP VISIT: CPT

## 2023-07-13 NOTE — TELEPHONE ENCOUNTER
Last VISIT 05/03/2023    Last CPE 03/16/2023    Last REFILL  Medication Quantity Refills Start End   traMADol 50 MG Oral Tab 21 tablet 0 3/22/2023    Sig:   Take 1 tablet (50 mg total) by mouth every 8 (eight) hours as needed for Pain. Last LABS  CBC,CMP- 05/05/2023      Per PROTOCOL? Refill pended    Patient comment: Hi Dr Maggie Desouza I was wondering if you can make this refill for about 45 pills because we will be in [place] for 3 weeks and lots of hiking and may need to take more than 1 a few days. Thank You,Pat       Please Approve or Deny.

## 2023-07-14 NOTE — TELEPHONE ENCOUNTER
Please contact the patient. I prescribed 21 tablets for him, but cannot prescribe chronic pain control. Please inquire if this is for help with MS-associated pain (managed by the neurology service), or related to his orthopedic-associated pain (managed by ortho service).

## 2023-07-14 NOTE — TELEPHONE ENCOUNTER
Spoke with pt and he stated that it is ortho related. Dr. Daly Daniel mentioned that if ortho does not refill, he can send for short term but would need to follow up with ortho for more refills.

## 2023-07-21 RX ORDER — TRAMADOL HYDROCHLORIDE 50 MG/1
50 TABLET ORAL EVERY 8 HOURS PRN
Qty: 45 TABLET | Refills: 0 | OUTPATIENT
Start: 2023-07-21

## 2023-07-21 RX ORDER — AMLODIPINE BESYLATE 10 MG/1
TABLET ORAL
Qty: 90 TABLET | Refills: 0 | Status: SHIPPED | OUTPATIENT
Start: 2023-07-21

## 2023-07-21 RX ORDER — AMLODIPINE BESYLATE 10 MG/1
10 TABLET ORAL DAILY
Qty: 90 TABLET | Refills: 0 | OUTPATIENT
Start: 2023-07-21

## 2023-07-21 NOTE — TELEPHONE ENCOUNTER
PASSED per protocol, refill sent. Last PE:  3--a.d.     Future Appointments   Date Time Provider Shena Sherman   8/29/2023 10:00 AM [id number]   12/11/2023  4:30 PM Nabila Shukla MD [facility]

## 2023-07-21 NOTE — TELEPHONE ENCOUNTER
Medication Quantity Refills Start End   AMLODIPINE 10 MG Oral Tab 90 tablet 0 7/21/2023    Sig:   Take one tablet by mouth daily.        Rx denied- Duplicate order

## 2023-08-22 ENCOUNTER — MED REC SCAN ONLY (OUTPATIENT)
Dept: INTERNAL MEDICINE CLINIC | Facility: CLINIC | Age: 63
End: 2023-08-22

## 2023-08-29 ENCOUNTER — OFFICE VISIT (OUTPATIENT)
Dept: PODIATRY CLINIC | Facility: CLINIC | Age: 63
End: 2023-08-29

## 2023-08-29 DIAGNOSIS — B35.1 ONYCHOMYCOSIS: Primary | ICD-10-CM

## 2023-08-29 DIAGNOSIS — L60.3 ONYCHODYSTROPHY: ICD-10-CM

## 2023-08-29 DIAGNOSIS — M79.675 PAIN OF TOE OF LEFT FOOT: ICD-10-CM

## 2023-08-29 PROCEDURE — 99213 OFFICE O/P EST LOW 20 MIN: CPT | Performed by: PODIATRIST

## 2023-08-31 ENCOUNTER — MED REC SCAN ONLY (OUTPATIENT)
Dept: INTERNAL MEDICINE CLINIC | Facility: CLINIC | Age: 63
End: 2023-08-31

## 2023-09-26 ENCOUNTER — TELEPHONE (OUTPATIENT)
Dept: INTERNAL MEDICINE CLINIC | Facility: CLINIC | Age: 63
End: 2023-09-26

## 2023-09-26 NOTE — TELEPHONE ENCOUNTER
Patient having spinal fusion on 12/7/23 with Dr. Shellie Luna at [facility] ([facility]). Our form faxed to Dr. Homer Andres office and placed in expandable folder in phone room.

## 2023-09-26 NOTE — TELEPHONE ENCOUNTER
Future Appointments   Date Time Provider Shena Whit   11/27/2023  2:20 PM Erik Rader MD [facility]     Red folder

## 2023-09-26 NOTE — TELEPHONE ENCOUNTER
Received pre-op medical clearance request from The [address] [place] [address]. Pt having surgery 12-7 and 12-12. Spoke with pt but he didn't have his calendar so will call back to schedule with AD. Request in blue pre-op folder awaiting callback.

## 2023-10-13 DIAGNOSIS — E78.5 DYSLIPIDEMIA: ICD-10-CM

## 2023-10-16 RX ORDER — AMLODIPINE BESYLATE 10 MG/1
10 TABLET ORAL DAILY
Qty: 90 TABLET | Refills: 0 | Status: SHIPPED | OUTPATIENT
Start: 2023-10-16

## 2023-10-16 RX ORDER — FENOFIBRATE 145 MG/1
145 TABLET, COATED ORAL DAILY
Qty: 90 TABLET | Refills: 0 | Status: SHIPPED | OUTPATIENT
Start: 2023-10-16

## 2023-10-16 RX ORDER — PRAVASTATIN SODIUM 80 MG/1
TABLET ORAL
Qty: 90 TABLET | Refills: 0 | Status: SHIPPED | OUTPATIENT
Start: 2023-10-16

## 2023-10-16 NOTE — TELEPHONE ENCOUNTER
Cholesterol Medication Protocol Kelfix41/13/2023 10:32 PM   Protocol Details Lipid panel within past 12 months    ALT < 80    ALT resulted within past year    Appointment within past 12 or next 3 months       Apt scheduled 11/7/2023 with Dr. Quijano

## 2023-10-16 NOTE — TELEPHONE ENCOUNTER
Hypertension Medications Protocol Mutvcr61/16/2023 10:04 AM   Protocol Details CMP or BMP in past 12 months    Last serum creatinine< 2.0    Appointment in past 6 or next 3 months

## 2023-11-20 ENCOUNTER — LABORATORY ENCOUNTER (OUTPATIENT)
Dept: LAB | Age: 63
End: 2023-11-20
Attending: ORTHOPAEDIC SURGERY
Payer: COMMERCIAL

## 2023-11-20 DIAGNOSIS — E78.2 MIXED HYPERLIPIDEMIA: ICD-10-CM

## 2023-11-20 DIAGNOSIS — Z00.00 ROUTINE GENERAL MEDICAL EXAMINATION AT A HEALTH CARE FACILITY: ICD-10-CM

## 2023-11-20 DIAGNOSIS — Z01.818 PRE-OP TESTING: ICD-10-CM

## 2023-11-20 DIAGNOSIS — Z12.5 PROSTATE CANCER SCREENING: ICD-10-CM

## 2023-11-20 DIAGNOSIS — D50.9 IRON DEFICIENCY ANEMIA, UNSPECIFIED IRON DEFICIENCY ANEMIA TYPE: ICD-10-CM

## 2023-11-20 LAB
ALBUMIN SERPL-MCNC: 3.5 G/DL (ref 3.4–5)
ALBUMIN/GLOB SERPL: 1.1 {RATIO} (ref 1–2)
ALP LIVER SERPL-CCNC: 45 U/L
ALT SERPL-CCNC: 28 U/L
ANION GAP SERPL CALC-SCNC: 4 MMOL/L (ref 0–18)
AST SERPL-CCNC: 46 U/L (ref 15–37)
BASOPHILS # BLD AUTO: 0.03 X10(3) UL (ref 0–0.2)
BASOPHILS NFR BLD AUTO: 1 %
BILIRUB SERPL-MCNC: 0.7 MG/DL (ref 0.1–2)
BUN BLD-MCNC: 22 MG/DL (ref 9–23)
CALCIUM BLD-MCNC: 9.1 MG/DL (ref 8.5–10.1)
CHLORIDE SERPL-SCNC: 111 MMOL/L (ref 98–112)
CHOLEST SERPL-MCNC: 134 MG/DL (ref ?–200)
CO2 SERPL-SCNC: 26 MMOL/L (ref 21–32)
COMPLEXED PSA SERPL-MCNC: 2.8 NG/ML (ref ?–4)
CREAT BLD-MCNC: 1.22 MG/DL
EGFRCR SERPLBLD CKD-EPI 2021: 66 ML/MIN/1.73M2 (ref 60–?)
EOSINOPHIL # BLD AUTO: 0.21 X10(3) UL (ref 0–0.7)
EOSINOPHIL NFR BLD AUTO: 7.1 %
ERYTHROCYTE [DISTWIDTH] IN BLOOD BY AUTOMATED COUNT: 15.2 %
FASTING PATIENT LIPID ANSWER: YES
FASTING STATUS PATIENT QL REPORTED: YES
GLOBULIN PLAS-MCNC: 3.2 G/DL (ref 2.8–4.4)
GLUCOSE BLD-MCNC: 91 MG/DL (ref 70–99)
HCT VFR BLD AUTO: 28.9 %
HDLC SERPL-MCNC: 10 MG/DL (ref 40–59)
HGB BLD-MCNC: 9.1 G/DL
IMM GRANULOCYTES # BLD AUTO: 0.01 X10(3) UL (ref 0–1)
IMM GRANULOCYTES NFR BLD: 0.3 %
LDLC SERPL CALC-MCNC: 84 MG/DL (ref ?–100)
LYMPHOCYTES # BLD AUTO: 1.26 X10(3) UL (ref 1–4)
LYMPHOCYTES NFR BLD AUTO: 42.6 %
MCH RBC QN AUTO: 28.3 PG (ref 26–34)
MCHC RBC AUTO-ENTMCNC: 31.5 G/DL (ref 31–37)
MCV RBC AUTO: 90 FL
MONOCYTES # BLD AUTO: 0.41 X10(3) UL (ref 0.1–1)
MONOCYTES NFR BLD AUTO: 13.9 %
NEUTROPHILS # BLD AUTO: 1.04 X10 (3) UL (ref 1.5–7.7)
NEUTROPHILS # BLD AUTO: 1.04 X10(3) UL (ref 1.5–7.7)
NEUTROPHILS NFR BLD AUTO: 35.1 %
NONHDLC SERPL-MCNC: 124 MG/DL (ref ?–130)
OSMOLALITY SERPL CALC.SUM OF ELEC: 295 MOSM/KG (ref 275–295)
PLATELET # BLD AUTO: 285 10(3)UL (ref 150–450)
POTASSIUM SERPL-SCNC: 4.5 MMOL/L (ref 3.5–5.1)
PROT SERPL-MCNC: 6.7 G/DL (ref 6.4–8.2)
RBC # BLD AUTO: 3.21 X10(6)UL
SODIUM SERPL-SCNC: 141 MMOL/L (ref 136–145)
TRIGL SERPL-MCNC: 237 MG/DL (ref 30–149)
VLDLC SERPL CALC-MCNC: 38 MG/DL (ref 0–30)
WBC # BLD AUTO: 3 X10(3) UL (ref 4–11)

## 2023-11-20 PROCEDURE — 36415 COLL VENOUS BLD VENIPUNCTURE: CPT

## 2023-11-20 PROCEDURE — 87081 CULTURE SCREEN ONLY: CPT

## 2023-11-20 PROCEDURE — 85025 COMPLETE CBC W/AUTO DIFF WBC: CPT

## 2023-11-20 PROCEDURE — 80053 COMPREHEN METABOLIC PANEL: CPT

## 2023-11-20 PROCEDURE — 80061 LIPID PANEL: CPT

## 2023-11-27 ENCOUNTER — OFFICE VISIT (OUTPATIENT)
Dept: PODIATRY CLINIC | Facility: CLINIC | Age: 63
End: 2023-11-27
Payer: COMMERCIAL

## 2023-11-27 ENCOUNTER — HOSPITAL ENCOUNTER (OUTPATIENT)
Dept: GENERAL RADIOLOGY | Age: 63
Discharge: HOME | End: 2023-11-27
Attending: INTERNAL MEDICINE
Payer: COMMERCIAL

## 2023-11-27 ENCOUNTER — OFFICE VISIT (OUTPATIENT)
Dept: INTERNAL MEDICINE CLINIC | Facility: CLINIC | Age: 63
End: 2023-11-27
Payer: COMMERCIAL

## 2023-11-27 ENCOUNTER — LAB ENCOUNTER (OUTPATIENT)
Dept: LAB | Age: 63
End: 2023-11-27
Attending: INTERNAL MEDICINE
Payer: COMMERCIAL

## 2023-11-27 VITALS
WEIGHT: 177.38 LBS | DIASTOLIC BLOOD PRESSURE: 54 MMHG | SYSTOLIC BLOOD PRESSURE: 112 MMHG | HEIGHT: 68 IN | HEART RATE: 71 BPM | BODY MASS INDEX: 26.88 KG/M2 | RESPIRATION RATE: 16 BRPM | TEMPERATURE: 97 F | OXYGEN SATURATION: 99 %

## 2023-11-27 DIAGNOSIS — Z01.818 PREOP EXAMINATION: ICD-10-CM

## 2023-11-27 DIAGNOSIS — B35.1 DERMATOPHYTOSIS OF NAIL: ICD-10-CM

## 2023-11-27 DIAGNOSIS — G47.33 OSA ON CPAP: ICD-10-CM

## 2023-11-27 DIAGNOSIS — M79.675 PAIN OF TOE OF LEFT FOOT: Primary | ICD-10-CM

## 2023-11-27 DIAGNOSIS — E78.2 MIXED HYPERLIPIDEMIA: ICD-10-CM

## 2023-11-27 DIAGNOSIS — B35.1 ONYCHOMYCOSIS: ICD-10-CM

## 2023-11-27 DIAGNOSIS — G35 MULTIPLE SCLEROSIS (HCC): ICD-10-CM

## 2023-11-27 DIAGNOSIS — Z01.818 PREOP EXAMINATION: Primary | ICD-10-CM

## 2023-11-27 DIAGNOSIS — L60.3 ONYCHODYSTROPHY: ICD-10-CM

## 2023-11-27 DIAGNOSIS — I10 BENIGN ESSENTIAL HTN: ICD-10-CM

## 2023-11-27 DIAGNOSIS — M48.061 LUMBAR FORAMINAL STENOSIS: ICD-10-CM

## 2023-11-27 DIAGNOSIS — M20.42 HAMMER TOE OF LEFT FOOT: ICD-10-CM

## 2023-11-27 DIAGNOSIS — L84 HELOMA DURUM: ICD-10-CM

## 2023-11-27 DIAGNOSIS — D64.9 NORMOCYTIC ANEMIA: ICD-10-CM

## 2023-11-27 DIAGNOSIS — M51.26 HNP (HERNIATED NUCLEUS PULPOSUS), LUMBAR: ICD-10-CM

## 2023-11-27 LAB
APTT PPP: 29.3 SECONDS (ref 23.3–35.6)
ATRIAL RATE: 65 BPM
BILIRUB UR QL STRIP.AUTO: NEGATIVE
CLARITY UR REFRACT.AUTO: CLEAR
COLOR UR AUTO: COLORLESS
GLUCOSE UR STRIP.AUTO-MCNC: NORMAL MG/DL
INR BLD: 1.32 (ref 0.8–1.2)
KETONES UR STRIP.AUTO-MCNC: NEGATIVE MG/DL
LEUKOCYTE ESTERASE UR QL STRIP.AUTO: NEGATIVE
NITRITE UR QL STRIP.AUTO: NEGATIVE
P AXIS: 61 DEGREES
P-R INTERVAL: 144 MS
PH UR STRIP.AUTO: 5.5 [PH] (ref 5–8)
PROT UR STRIP.AUTO-MCNC: NEGATIVE MG/DL
PROTHROMBIN TIME: 16.5 SECONDS (ref 11.6–14.8)
Q-T INTERVAL: 360 MS
QRS DURATION: 82 MS
QTC CALCULATION (BEZET): 374 MS
R AXIS: -10 DEGREES
RBC UR QL AUTO: NEGATIVE
SP GR UR STRIP.AUTO: 1 (ref 1–1.03)
T AXIS: 23 DEGREES
UROBILINOGEN UR STRIP.AUTO-MCNC: NORMAL MG/DL
VENTRICULAR RATE: 65 BPM

## 2023-11-27 PROCEDURE — 85730 THROMBOPLASTIN TIME PARTIAL: CPT

## 2023-11-27 PROCEDURE — 99213 OFFICE O/P EST LOW 20 MIN: CPT | Performed by: PODIATRIST

## 2023-11-27 PROCEDURE — 36415 COLL VENOUS BLD VENIPUNCTURE: CPT

## 2023-11-27 PROCEDURE — 81003 URINALYSIS AUTO W/O SCOPE: CPT

## 2023-11-27 PROCEDURE — 71046 X-RAY EXAM CHEST 2 VIEWS: CPT | Performed by: INTERNAL MEDICINE

## 2023-11-27 PROCEDURE — 85610 PROTHROMBIN TIME: CPT

## 2023-11-27 RX ORDER — TAMSULOSIN HYDROCHLORIDE 0.4 MG/1
0.4 CAPSULE ORAL DAILY
COMMUNITY
Start: 2023-11-14 | End: 2024-11-13

## 2023-11-27 NOTE — PROGRESS NOTES
Nisha Trejo is a 58year old male. Chief Complaint   Patient presents with    Toenail Care     Nail care and foot check- patient denies pain at this time. Corn     Left 2nd toe- patient denies pain at this time. HPI:   Patient returns to the clinic for follow-up on his toenail fungus he complains mostly of the second toe because it also builds a corn up at the tip and is very painful when he walks on it in enclosed shoes. Patient indicates he will be undergoing spinal surgery which will be done in 2 stages on December 7 and December 12 because of severe scoliosis and back pain. At today's visit reviewed nurse's history as taken above, allergies medications and medical history as documented below. All changes duly noted  Allergies: Patient has no known allergies. Current Outpatient Medications   Medication Sig Dispense Refill    fenofibrate 145 MG Oral Tab Take 1 tablet (145 mg total) by mouth daily. 90 tablet 0    Pravastatin Sodium 80 MG Oral Tab TAKE 1 TABLET BY MOUTH  DAILY 90 tablet 0    AMLODIPINE 10 MG Oral Tab TAKE ONE TABLET BY MOUTH ONCE DAILY 90 tablet 0    traMADol 50 MG Oral Tab Take 1 tablet (50 mg total) by mouth every 8 (eight) hours as needed for Pain. 21 tablet 0    omega-3 fatty acids 1000 MG Oral Cap Take 1,000 mg by mouth As Directed. LOSARTAN 100 MG Oral Tab TAKE 1 TABLET BY MOUTH DAILY (Patient taking differently: every evening.) 90 tablet 3    Acidophilus/Pectin Oral Cap Take 1 capsule by mouth daily. Calcium Carbonate Antacid (TUMS OR) Take by mouth as needed. Omeprazole 40 MG Oral Capsule Delayed Release Take 1 capsule (40 mg total) by mouth daily. (Patient taking differently: Take 1 capsule (40 mg total) by mouth daily. prn) 30 capsule 0    folic acid 1 MG Oral Tab Take by mouth daily. Psyllium (METAMUCIL FIBER OR) Take by mouth daily.       Vardenafil HCl (LEVITRA) 20 MG Oral Tab Take 1 tablet (20 mg total) by mouth daily as needed for Erectile Dysfunction. 10 tablet 11    Cholecalciferol (VITAMIN D) 2000 UNITS Oral Cap Take 1 capsule (2,000 Units total) by mouth daily. Cyanocobalamin (VITAMIN B 12 OR) Take 1 tablet by mouth daily. Teriflunomide 14 MG Oral Tab Take 14 mg by mouth daily. AUBAGIO       Oxybutynin Chloride 5 MG Oral Tab Take 2 tablets (10 mg total) by mouth as needed. Past Medical History:   Diagnosis Date    Calculus of kidney 1980    Cancer ([address] 2011    colon    Carcinoid tumor     cecal carcinoid    Chest pain     Colon polyps     Diverticulitis     left colon    Diverticulosis     Esophageal reflux 02/2016    Fe deficiency anemia     Frequent urination 2016    Believed to be side affect of MS    High blood pressure     High cholesterol     Multiple sclerosis ([address] 06/2015    Nephrolithiasis     Pain in joints 2018    Hip pain. ..total hip replacement 6-2020    Pneumonia 2006    Prediabetes     Diet control    Sleep apnea 2000    Testicular lump     right    Visual impairment     glasses    Wears glasses 1976    Glasses      Past Surgical History:   Procedure Laterality Date    APPENDECTOMY  02/2011    Removed in Colon cancer surgury    BOWEL RESECTION      CHOLECYSTECTOMY      COLECTOMY  2011 & 2019    To remove colon cancer    COLONOSCOPY  01/31/2012    COLONOSCOPY  01/2015    since colon cancer have 1 every 3 years    ERCP,DIAGNOSTIC      FRACTURE SURGERY      Fracture of right 4th finger repaired - no metal    HERNIA SURGERY  05/09/2019    Abd.     HIP REPLACEMENT SURGERY      right hip replacement    LAPAROSCOPIC CHOLECYSTECTOMY  05/06/2023    Robotic cholecystectomy    LAPAROSCOPY, SURGICAL; COLECTOMY, PARTIAL, W/ ANASTOMOS  02/2011    OTHER SURGICAL HISTORY  02/05/2019    Laparoscopic-assisted small bowel resection with anastomosis    OTHER SURGICAL HISTORY  03/08/2019    Repair of incarcerated incisional hernia    TONSILLECTOMY      VASECTOMY  1990      Family History   Problem Relation Age of Onset    Diabetes Maternal Grandfather     Cancer Father         melanoma  18    Other (melanoma) Father         and pts uncle    Other (multiple sclerosis) Mother     Other (Other) Mother         MS    Other Mother         MS    Other (breast cancer) Maternal Grandmother     Other (heart attack) Paternal Grandmother 79      Social History     Socioeconomic History    Marital status:    Tobacco Use    Smoking status: Never    Smokeless tobacco: Never   Vaping Use    Vaping Use: Never used   Substance and Sexual Activity    Alcohol use: Yes     Alcohol/week: 2.0 - 3.0 standard drinks of alcohol     Types: 2 - 3 Cans of beer per week     Comment: 2 to 3 beers a week    Drug use: No   Other Topics Concern    Caffeine Concern Yes    Stress Concern No    Weight Concern Yes     Comment: about 15 to 20 pounds over weight    Special Diet No    Exercise Yes    Seat Belt Yes           REVIEW OF SYSTEMS:   Today reviewed systens as documented below  GENERAL HEALTH: feels well otherwise  SKIN: Refer to exam below  RESPIRATORY: denies shortness of breath with exertion  CARDIOVASCULAR: denies chest pain on exertion  GI: denies abdominal pain and denies heartburn  NEURO: denies headaches    EXAM:   There were no vitals taken for this visit. GENERAL: well developed, well nourished, in no apparent distress  EXTREMITIES:   1. Integument: The skin on his feet is warm and dry several of his toenails have deformity a couple of nails on his right foot have clubbing and there is hyperkeratotic tissue and subungual keratosis underneath the second toe of his left foot which was trimmed and debrided today. 2. Vascular: The patient has palpable pulses   3. Neurologic: Patient has grossly intact sensorium   4. Musculoskeletal: The patient has good muscle strength. There are no signs of infection on the patient's feet.   ASSESSMENT AND PLAN:   Diagnoses and all orders for this visit:    Pain of toe of left foot    Onychomycosis    Onychodystrophy    Dermatophytosis of nail    Hammer toe of left foot    Pepe gonzalez        Plan: Today trimmed down debrided all affected toenails some of the lesser toenails in his hallux nails already well trimmed. Did not require any debridement recommended he continue to use the [facility] and have his toes and calluses and corns debrided every couple of months as needed. Follow-up as needed. The patient indicates understanding of these issues and agrees to the plan.     Pradeep Jones DPM

## 2023-11-29 ENCOUNTER — HOSPITAL ENCOUNTER (OUTPATIENT)
Dept: PHYSICAL THERAPY | Facility: HOSPITAL | Age: 63
Discharge: HOME | End: 2023-11-29
Attending: ORTHOPAEDIC SURGERY
Payer: COMMERCIAL

## 2023-11-29 DIAGNOSIS — M51.16 DISPLACEMENT OF LUMBAR INTERVERTEBRAL DISC WITH RADICULOPATHY: ICD-10-CM

## 2023-12-07 ENCOUNTER — APPOINTMENT (OUTPATIENT)
Dept: GENERAL RADIOLOGY | Facility: HOSPITAL | Age: 63
End: 2023-12-07
Attending: ORTHOPAEDIC SURGERY
Payer: COMMERCIAL

## 2023-12-07 ENCOUNTER — HOSPITAL ENCOUNTER (INPATIENT)
Facility: HOSPITAL | Age: 63
LOS: 2 days | Discharge: HOME | End: 2023-12-09
Attending: ORTHOPAEDIC SURGERY | Admitting: ORTHOPAEDIC SURGERY
Payer: COMMERCIAL

## 2023-12-07 ENCOUNTER — ANESTHESIA (OUTPATIENT)
Dept: SURGERY | Facility: HOSPITAL | Age: 63
End: 2023-12-07
Payer: COMMERCIAL

## 2023-12-07 ENCOUNTER — ANESTHESIA EVENT (OUTPATIENT)
Dept: SURGERY | Facility: HOSPITAL | Age: 63
End: 2023-12-07
Payer: COMMERCIAL

## 2023-12-07 DIAGNOSIS — Z01.818 PRE-OP TESTING: Primary | ICD-10-CM

## 2023-12-07 DIAGNOSIS — M51.16 DISPLACEMENT OF LUMBAR INTERVERTEBRAL DISC WITH RADICULOPATHY: ICD-10-CM

## 2023-12-07 LAB
ANTIBODY SCREEN: NEGATIVE
INR BLD: 1.4 (ref 0.8–1.2)
PROTHROMBIN TIME: 17.2 SECONDS (ref 11.6–14.8)
RH BLOOD TYPE: POSITIVE

## 2023-12-07 PROCEDURE — 0SG10A0 FUSION OF 2 OR MORE LUMBAR VERTEBRAL JOINTS WITH INTERBODY FUSION DEVICE, ANTERIOR APPROACH, ANTERIOR COLUMN, OPEN APPROACH: ICD-10-PCS | Performed by: ORTHOPAEDIC SURGERY

## 2023-12-07 PROCEDURE — 22853 INSJ BIOMECHANICAL DEVICE: CPT | Performed by: SURGERY

## 2023-12-07 PROCEDURE — 22585 ARTHRD ANT NTRBD MIN DSC EA: CPT | Performed by: SURGERY

## 2023-12-07 PROCEDURE — 3E0T3BZ INTRODUCTION OF ANESTHETIC AGENT INTO PERIPHERAL NERVES AND PLEXI, PERCUTANEOUS APPROACH: ICD-10-PCS | Performed by: ANESTHESIOLOGY

## 2023-12-07 PROCEDURE — 76000 FLUOROSCOPY <1 HR PHYS/QHP: CPT | Performed by: ORTHOPAEDIC SURGERY

## 2023-12-07 PROCEDURE — 22558 ARTHRD ANT NTRBD MIN DSC LUM: CPT | Performed by: SURGERY

## 2023-12-07 PROCEDURE — 0ST40ZZ RESECTION OF LUMBOSACRAL DISC, OPEN APPROACH: ICD-10-PCS | Performed by: ORTHOPAEDIC SURGERY

## 2023-12-07 PROCEDURE — 4A11X4G MONITORING OF PERIPHERAL NERVOUS ELECTRICAL ACTIVITY, INTRAOPERATIVE, EXTERNAL APPROACH: ICD-10-PCS | Performed by: ORTHOPAEDIC SURGERY

## 2023-12-07 PROCEDURE — 99221 1ST HOSP IP/OBS SF/LOW 40: CPT | Performed by: STUDENT IN AN ORGANIZED HEALTH CARE EDUCATION/TRAINING PROGRAM

## 2023-12-07 PROCEDURE — 50715 RELEASE OF URETER: CPT | Performed by: SURGERY

## 2023-12-07 PROCEDURE — 22845 INSERT SPINE FIXATION DEVICE: CPT | Performed by: SURGERY

## 2023-12-07 PROCEDURE — 0TN70ZZ RELEASE LEFT URETER, OPEN APPROACH: ICD-10-PCS | Performed by: SURGERY

## 2023-12-07 PROCEDURE — 0ST20ZZ RESECTION OF LUMBAR VERTEBRAL DISC, OPEN APPROACH: ICD-10-PCS | Performed by: ORTHOPAEDIC SURGERY

## 2023-12-07 RX ORDER — HYDROMORPHONE HYDROCHLORIDE 1 MG/ML
0.6 INJECTION, SOLUTION INTRAMUSCULAR; INTRAVENOUS; SUBCUTANEOUS EVERY 5 MIN PRN
Status: DISCONTINUED | OUTPATIENT
Start: 2023-12-07 | End: 2023-12-07 | Stop reason: HOSPADM

## 2023-12-07 RX ORDER — POLYETHYLENE GLYCOL 3350 17 G/17G
17 POWDER, FOR SOLUTION ORAL DAILY PRN
Status: DISCONTINUED | OUTPATIENT
Start: 2023-12-07 | End: 2023-12-09

## 2023-12-07 RX ORDER — HYDROMORPHONE HYDROCHLORIDE 1 MG/ML
0.2 INJECTION, SOLUTION INTRAMUSCULAR; INTRAVENOUS; SUBCUTANEOUS EVERY 5 MIN PRN
Status: DISCONTINUED | OUTPATIENT
Start: 2023-12-07 | End: 2023-12-07 | Stop reason: HOSPADM

## 2023-12-07 RX ORDER — DOCUSATE SODIUM 100 MG/1
100 CAPSULE, LIQUID FILLED ORAL 2 TIMES DAILY
Status: DISCONTINUED | OUTPATIENT
Start: 2023-12-07 | End: 2023-12-09

## 2023-12-07 RX ORDER — HYDROMORPHONE HYDROCHLORIDE 1 MG/ML
0.4 INJECTION, SOLUTION INTRAMUSCULAR; INTRAVENOUS; SUBCUTANEOUS EVERY 5 MIN PRN
Status: DISCONTINUED | OUTPATIENT
Start: 2023-12-07 | End: 2023-12-07 | Stop reason: HOSPADM

## 2023-12-07 RX ORDER — PROCHLORPERAZINE EDISYLATE 5 MG/ML
5 INJECTION INTRAMUSCULAR; INTRAVENOUS EVERY 8 HOURS PRN
Status: DISCONTINUED | OUTPATIENT
Start: 2023-12-07 | End: 2023-12-09

## 2023-12-07 RX ORDER — MEPERIDINE HYDROCHLORIDE 25 MG/ML
12.5 INJECTION INTRAMUSCULAR; INTRAVENOUS; SUBCUTANEOUS AS NEEDED
Status: DISCONTINUED | OUTPATIENT
Start: 2023-12-07 | End: 2023-12-07 | Stop reason: HOSPADM

## 2023-12-07 RX ORDER — VANCOMYCIN HYDROCHLORIDE 1 G/20ML
INJECTION, POWDER, LYOPHILIZED, FOR SOLUTION INTRAVENOUS AS NEEDED
Status: DISCONTINUED | OUTPATIENT
Start: 2023-12-07 | End: 2023-12-07 | Stop reason: HOSPADM

## 2023-12-07 RX ORDER — ONDANSETRON 2 MG/ML
4 INJECTION INTRAMUSCULAR; INTRAVENOUS EVERY 6 HOURS PRN
Status: DISCONTINUED | OUTPATIENT
Start: 2023-12-07 | End: 2023-12-07 | Stop reason: HOSPADM

## 2023-12-07 RX ORDER — HYDROMORPHONE HYDROCHLORIDE 1 MG/ML
0.4 INJECTION, SOLUTION INTRAMUSCULAR; INTRAVENOUS; SUBCUTANEOUS EVERY 2 HOUR PRN
Status: DISCONTINUED | OUTPATIENT
Start: 2023-12-07 | End: 2023-12-09

## 2023-12-07 RX ORDER — CEFAZOLIN SODIUM/WATER 2 G/20 ML
2 SYRINGE (ML) INTRAVENOUS ONCE
Status: COMPLETED | OUTPATIENT
Start: 2023-12-07 | End: 2023-12-07

## 2023-12-07 RX ORDER — ACETAMINOPHEN 10 MG/ML
INJECTION, SOLUTION INTRAVENOUS AS NEEDED
Status: DISCONTINUED | OUTPATIENT
Start: 2023-12-07 | End: 2023-12-07 | Stop reason: SURG

## 2023-12-07 RX ORDER — SODIUM CHLORIDE, SODIUM LACTATE, POTASSIUM CHLORIDE, CALCIUM CHLORIDE 600; 310; 30; 20 MG/100ML; MG/100ML; MG/100ML; MG/100ML
INJECTION, SOLUTION INTRAVENOUS CONTINUOUS
Status: DISCONTINUED | OUTPATIENT
Start: 2023-12-07 | End: 2023-12-07 | Stop reason: HOSPADM

## 2023-12-07 RX ORDER — BISACODYL 10 MG
10 SUPPOSITORY, RECTAL RECTAL
Status: DISCONTINUED | OUTPATIENT
Start: 2023-12-07 | End: 2023-12-09

## 2023-12-07 RX ORDER — ACETAMINOPHEN 500 MG
1000 TABLET ORAL ONCE
Status: DISCONTINUED | OUTPATIENT
Start: 2023-12-07 | End: 2023-12-07 | Stop reason: HOSPADM

## 2023-12-07 RX ORDER — NICOTINE POLACRILEX 4 MG
15 LOZENGE BUCCAL
Status: DISCONTINUED | OUTPATIENT
Start: 2023-12-07 | End: 2023-12-07 | Stop reason: HOSPADM

## 2023-12-07 RX ORDER — BUPIVACAINE HYDROCHLORIDE 5 MG/ML
INJECTION, SOLUTION EPIDURAL; INTRACAUDAL AS NEEDED
Status: DISCONTINUED | OUTPATIENT
Start: 2023-12-07 | End: 2023-12-07 | Stop reason: HOSPADM

## 2023-12-07 RX ORDER — PRAVASTATIN SODIUM 80 MG/1
80 TABLET ORAL NIGHTLY
COMMUNITY
End: 2023-12-24

## 2023-12-07 RX ORDER — ONDANSETRON 2 MG/ML
INJECTION INTRAMUSCULAR; INTRAVENOUS AS NEEDED
Status: DISCONTINUED | OUTPATIENT
Start: 2023-12-07 | End: 2023-12-07 | Stop reason: SURG

## 2023-12-07 RX ORDER — PROCHLORPERAZINE EDISYLATE 5 MG/ML
5 INJECTION INTRAMUSCULAR; INTRAVENOUS EVERY 8 HOURS PRN
Status: DISCONTINUED | OUTPATIENT
Start: 2023-12-07 | End: 2023-12-07 | Stop reason: HOSPADM

## 2023-12-07 RX ORDER — ENEMA 19; 7 G/133ML; G/133ML
1 ENEMA RECTAL ONCE AS NEEDED
Status: DISCONTINUED | OUTPATIENT
Start: 2023-12-07 | End: 2023-12-09

## 2023-12-07 RX ORDER — NALOXONE HYDROCHLORIDE 0.4 MG/ML
0.08 INJECTION, SOLUTION INTRAMUSCULAR; INTRAVENOUS; SUBCUTANEOUS AS NEEDED
Status: DISCONTINUED | OUTPATIENT
Start: 2023-12-07 | End: 2023-12-07 | Stop reason: HOSPADM

## 2023-12-07 RX ORDER — ONDANSETRON 2 MG/ML
4 INJECTION INTRAMUSCULAR; INTRAVENOUS EVERY 6 HOURS PRN
Status: DISCONTINUED | OUTPATIENT
Start: 2023-12-07 | End: 2023-12-09

## 2023-12-07 RX ORDER — DIPHENHYDRAMINE HYDROCHLORIDE 50 MG/ML
12.5 INJECTION INTRAMUSCULAR; INTRAVENOUS AS NEEDED
Status: DISCONTINUED | OUTPATIENT
Start: 2023-12-07 | End: 2023-12-07 | Stop reason: HOSPADM

## 2023-12-07 RX ORDER — DIPHENHYDRAMINE HCL 25 MG
25 CAPSULE ORAL EVERY 4 HOURS PRN
Status: DISCONTINUED | OUTPATIENT
Start: 2023-12-07 | End: 2023-12-09

## 2023-12-07 RX ORDER — HYDROCODONE BITARTRATE AND ACETAMINOPHEN 5; 325 MG/1; MG/1
1 TABLET ORAL ONCE AS NEEDED
Status: DISCONTINUED | OUTPATIENT
Start: 2023-12-07 | End: 2023-12-07 | Stop reason: HOSPADM

## 2023-12-07 RX ORDER — ATORVASTATIN CALCIUM 20 MG/1
20 TABLET, FILM COATED ORAL NIGHTLY
Status: DISCONTINUED | OUTPATIENT
Start: 2023-12-07 | End: 2023-12-09

## 2023-12-07 RX ORDER — HYDROCODONE BITARTRATE AND ACETAMINOPHEN 5; 325 MG/1; MG/1
2 TABLET ORAL ONCE AS NEEDED
Status: DISCONTINUED | OUTPATIENT
Start: 2023-12-07 | End: 2023-12-07 | Stop reason: HOSPADM

## 2023-12-07 RX ORDER — TERIFLUNOMIDE 14 MG/1
14 TABLET, FILM COATED ORAL DAILY
Status: DISCONTINUED | OUTPATIENT
Start: 2023-12-08 | End: 2023-12-07 | Stop reason: SDUPTHER

## 2023-12-07 RX ORDER — DEXTROSE MONOHYDRATE 25 G/50ML
50 INJECTION, SOLUTION INTRAVENOUS
Status: DISCONTINUED | OUTPATIENT
Start: 2023-12-07 | End: 2023-12-07 | Stop reason: HOSPADM

## 2023-12-07 RX ORDER — ACETAMINOPHEN 500 MG
1000 TABLET ORAL ONCE AS NEEDED
Status: DISCONTINUED | OUTPATIENT
Start: 2023-12-07 | End: 2023-12-07 | Stop reason: HOSPADM

## 2023-12-07 RX ORDER — DIPHENHYDRAMINE HYDROCHLORIDE 50 MG/ML
25 INJECTION INTRAMUSCULAR; INTRAVENOUS EVERY 4 HOURS PRN
Status: DISCONTINUED | OUTPATIENT
Start: 2023-12-07 | End: 2023-12-09

## 2023-12-07 RX ORDER — NEOSTIGMINE METHYLSULFATE 1 MG/ML
INJECTION, SOLUTION INTRAVENOUS AS NEEDED
Status: DISCONTINUED | OUTPATIENT
Start: 2023-12-07 | End: 2023-12-07 | Stop reason: SURG

## 2023-12-07 RX ORDER — LOSARTAN POTASSIUM 100 MG/1
100 TABLET ORAL NIGHTLY
COMMUNITY

## 2023-12-07 RX ORDER — DEXAMETHASONE SODIUM PHOSPHATE 4 MG/ML
VIAL (ML) INJECTION AS NEEDED
Status: DISCONTINUED | OUTPATIENT
Start: 2023-12-07 | End: 2023-12-07 | Stop reason: SURG

## 2023-12-07 RX ORDER — NICOTINE POLACRILEX 4 MG
30 LOZENGE BUCCAL
Status: DISCONTINUED | OUTPATIENT
Start: 2023-12-07 | End: 2023-12-07 | Stop reason: HOSPADM

## 2023-12-07 RX ORDER — OXYCODONE HYDROCHLORIDE 5 MG/1
5 TABLET ORAL EVERY 4 HOURS PRN
Status: DISCONTINUED | OUTPATIENT
Start: 2023-12-07 | End: 2023-12-09

## 2023-12-07 RX ORDER — GLYCOPYRROLATE 0.2 MG/ML
INJECTION, SOLUTION INTRAMUSCULAR; INTRAVENOUS AS NEEDED
Status: DISCONTINUED | OUTPATIENT
Start: 2023-12-07 | End: 2023-12-07 | Stop reason: SURG

## 2023-12-07 RX ORDER — MIDAZOLAM HYDROCHLORIDE 1 MG/ML
1 INJECTION INTRAMUSCULAR; INTRAVENOUS EVERY 5 MIN PRN
Status: DISCONTINUED | OUTPATIENT
Start: 2023-12-07 | End: 2023-12-07 | Stop reason: HOSPADM

## 2023-12-07 RX ORDER — SENNOSIDES 8.6 MG
17.2 TABLET ORAL NIGHTLY
Status: DISCONTINUED | OUTPATIENT
Start: 2023-12-07 | End: 2023-12-09

## 2023-12-07 RX ORDER — CEFAZOLIN SODIUM/WATER 2 G/20 ML
2 SYRINGE (ML) INTRAVENOUS EVERY 8 HOURS
Qty: 40 ML | Refills: 0 | Status: COMPLETED | OUTPATIENT
Start: 2023-12-07 | End: 2023-12-07

## 2023-12-07 RX ORDER — HYDROMORPHONE HYDROCHLORIDE 1 MG/ML
0.8 INJECTION, SOLUTION INTRAMUSCULAR; INTRAVENOUS; SUBCUTANEOUS EVERY 2 HOUR PRN
Status: DISCONTINUED | OUTPATIENT
Start: 2023-12-07 | End: 2023-12-09

## 2023-12-07 RX ORDER — OXYCODONE HYDROCHLORIDE 10 MG/1
10 TABLET ORAL EVERY 4 HOURS PRN
Status: DISCONTINUED | OUTPATIENT
Start: 2023-12-07 | End: 2023-12-09

## 2023-12-07 RX ORDER — SODIUM CHLORIDE 9 MG/ML
INJECTION, SOLUTION INTRAVENOUS CONTINUOUS PRN
Status: DISCONTINUED | OUTPATIENT
Start: 2023-12-07 | End: 2023-12-07 | Stop reason: SURG

## 2023-12-07 RX ORDER — LIDOCAINE HYDROCHLORIDE 10 MG/ML
INJECTION, SOLUTION EPIDURAL; INFILTRATION; INTRACAUDAL; PERINEURAL AS NEEDED
Status: DISCONTINUED | OUTPATIENT
Start: 2023-12-07 | End: 2023-12-07 | Stop reason: SURG

## 2023-12-07 RX ORDER — SODIUM CHLORIDE, SODIUM LACTATE, POTASSIUM CHLORIDE, CALCIUM CHLORIDE 600; 310; 30; 20 MG/100ML; MG/100ML; MG/100ML; MG/100ML
INJECTION, SOLUTION INTRAVENOUS CONTINUOUS
Status: DISCONTINUED | OUTPATIENT
Start: 2023-12-07 | End: 2023-12-09

## 2023-12-07 RX ORDER — TERIFLUNOMIDE 14 MG/1
14 TABLET, FILM COATED ORAL DAILY
Status: DISCONTINUED | OUTPATIENT
Start: 2023-12-07 | End: 2023-12-09

## 2023-12-07 RX ORDER — METHOCARBAMOL 750 MG/1
1 TABLET, FILM COATED ORAL 3 TIMES DAILY
COMMUNITY
Start: 2023-12-04

## 2023-12-07 RX ORDER — OXYCODONE HYDROCHLORIDE AND ACETAMINOPHEN 5; 325 MG/1; MG/1
1-2 TABLET ORAL
COMMUNITY
Start: 2023-12-04

## 2023-12-07 RX ORDER — ROCURONIUM BROMIDE 10 MG/ML
INJECTION, SOLUTION INTRAVENOUS AS NEEDED
Status: DISCONTINUED | OUTPATIENT
Start: 2023-12-07 | End: 2023-12-07 | Stop reason: SURG

## 2023-12-07 RX ORDER — TAMSULOSIN HYDROCHLORIDE 0.4 MG/1
0.4 CAPSULE ORAL DAILY
Status: DISCONTINUED | OUTPATIENT
Start: 2023-12-07 | End: 2023-12-09

## 2023-12-07 RX ORDER — METHOCARBAMOL 750 MG/1
750 TABLET, FILM COATED ORAL EVERY 6 HOURS PRN
Status: DISCONTINUED | OUTPATIENT
Start: 2023-12-07 | End: 2023-12-09

## 2023-12-07 RX ADMIN — DEXAMETHASONE SODIUM PHOSPHATE 8 MG: 4 MG/ML VIAL (ML) INJECTION at 08:13:00

## 2023-12-07 RX ADMIN — DEXAMETHASONE SODIUM PHOSPHATE 4 MG: 4 MG/ML VIAL (ML) INJECTION at 10:35:00

## 2023-12-07 RX ADMIN — NEOSTIGMINE METHYLSULFATE 2 MG: 1 INJECTION, SOLUTION INTRAVENOUS at 10:35:00

## 2023-12-07 RX ADMIN — ONDANSETRON 4 MG: 2 INJECTION INTRAMUSCULAR; INTRAVENOUS at 08:13:00

## 2023-12-07 RX ADMIN — LIDOCAINE HYDROCHLORIDE 50 MG: 10 INJECTION, SOLUTION EPIDURAL; INFILTRATION; INTRACAUDAL; PERINEURAL at 08:13:00

## 2023-12-07 RX ADMIN — SODIUM CHLORIDE, SODIUM LACTATE, POTASSIUM CHLORIDE, CALCIUM CHLORIDE: 600; 310; 30; 20 INJECTION, SOLUTION INTRAVENOUS at 08:09:00

## 2023-12-07 RX ADMIN — GLYCOPYRROLATE 0.4 MG: 0.2 INJECTION, SOLUTION INTRAMUSCULAR; INTRAVENOUS at 10:35:00

## 2023-12-07 RX ADMIN — ONDANSETRON 4 MG: 2 INJECTION INTRAMUSCULAR; INTRAVENOUS at 10:35:00

## 2023-12-07 RX ADMIN — ACETAMINOPHEN 1000 MG: 10 INJECTION, SOLUTION INTRAVENOUS at 10:35:00

## 2023-12-07 RX ADMIN — CEFAZOLIN SODIUM/WATER 2 G: 2 G/20 ML SYRINGE (ML) INTRAVENOUS at 08:30:00

## 2023-12-07 RX ADMIN — ROCURONIUM BROMIDE 50 MG: 10 INJECTION, SOLUTION INTRAVENOUS at 08:13:00

## 2023-12-07 RX ADMIN — SODIUM CHLORIDE: 9 INJECTION, SOLUTION INTRAVENOUS at 08:20:00

## 2023-12-07 NOTE — ANESTHESIA PROCEDURE NOTES
Airway  Date/Time: 12/7/2023 8:15 AM  Urgency: elective      General Information and Staff    Patient location during procedure: OR  Anesthesiologist: Nicola Corley MD  Performed: anesthesiologist   Performed by: Nicola Corley MD  Authorized by: Nicola Corley MD      Indications and Patient Condition  Indications for airway management: anesthesia  Sedation level: deep  Preoxygenated: yes  Patient position: sniffing  Mask difficulty assessment: 1 - vent by mask    Final Airway Details  Final airway type: endotracheal airway      Successful airway: ETT  Cuffed: yes   Successful intubation technique: direct laryngoscopy  Endotracheal tube insertion site: oral  Blade: Kaye  Blade size: #4    Placement verified by: capnometry   Measured from: lips  ETT to lips (cm): 23  Number of attempts at approach: 1

## 2023-12-07 NOTE — ANESTHESIA PROCEDURE NOTES
Arterial Line    Date/Time: 8/20/2023 11:10 AM    Performed by: Eve Linares MD  Authorized by: Eve Linares MD    General Information and Staff    Procedure Start:  8/20/2023 11:10 AM  Anesthesiologist:  Eve Linares MD  Performed By:  Anesthesiologist  Patient Location:  OR  Indication: continuous blood pressure monitoring and blood sampling needed    Site Identification: surface landmarks    Preanesthetic Checklist: 2 patient identifiers, IV checked, risks and benefits discussed, monitors and equipment checked, pre-op evaluation, timeout performed, anesthesia consent and sterile technique used    Procedure Details    Catheter Size:  20 G  Catheter Length:  1 and 3/4 inch  Catheter Type:  Arrow  Seldinger Technique?: Yes    Site:  Radial artery  Site Prep: chlorhexidine    Line Secured:  Wrist Brace, tape and Tegaderm    Assessment    Events: patient tolerated procedure well with no complications      Medications  8/20/2023 11:10 AM      Additional Comments

## 2023-12-07 NOTE — PROGRESS NOTES
S/p Anterior Lumbar Interbody  Fusion L4 - L 5, L 5 - S 1   Admitted via bed. Oriented to room. Wife at bedside. CAROLINA protocol - will be on tele.

## 2023-12-08 PROBLEM — Z98.1 S/P LUMBAR FUSION: Status: ACTIVE | Noted: 2023-12-08

## 2023-12-08 LAB
ANION GAP SERPL CALC-SCNC: 3 MMOL/L (ref 0–18)
BASOPHILS # BLD AUTO: 0.01 X10(3) UL (ref 0–0.2)
BASOPHILS NFR BLD AUTO: 0.1 %
BUN BLD-MCNC: 25 MG/DL (ref 9–23)
CALCIUM BLD-MCNC: 8.4 MG/DL (ref 8.5–10.1)
CHLORIDE SERPL-SCNC: 110 MMOL/L (ref 98–112)
CO2 SERPL-SCNC: 26 MMOL/L (ref 21–32)
CREAT BLD-MCNC: 1.47 MG/DL
EGFRCR SERPLBLD CKD-EPI 2021: 53 ML/MIN/1.73M2 (ref 60–?)
EOSINOPHIL # BLD AUTO: 0 X10(3) UL (ref 0–0.7)
EOSINOPHIL NFR BLD AUTO: 0 %
ERYTHROCYTE [DISTWIDTH] IN BLOOD BY AUTOMATED COUNT: 15.7 %
GLUCOSE BLD-MCNC: 116 MG/DL (ref 70–99)
HCT VFR BLD AUTO: 23.1 %
HGB BLD-MCNC: 7.5 G/DL
IMM GRANULOCYTES # BLD AUTO: 0.05 X10(3) UL (ref 0–1)
IMM GRANULOCYTES NFR BLD: 0.6 %
LYMPHOCYTES # BLD AUTO: 0.85 X10(3) UL (ref 1–4)
LYMPHOCYTES NFR BLD AUTO: 10.3 %
MCH RBC QN AUTO: 28.3 PG (ref 26–34)
MCHC RBC AUTO-ENTMCNC: 32.5 G/DL (ref 31–37)
MCV RBC AUTO: 87.2 FL
MONOCYTES # BLD AUTO: 0.96 X10(3) UL (ref 0.1–1)
MONOCYTES NFR BLD AUTO: 11.6 %
NEUTROPHILS # BLD AUTO: 6.38 X10 (3) UL (ref 1.5–7.7)
NEUTROPHILS # BLD AUTO: 6.38 X10(3) UL (ref 1.5–7.7)
NEUTROPHILS NFR BLD AUTO: 77.4 %
OSMOLALITY SERPL CALC.SUM OF ELEC: 293 MOSM/KG (ref 275–295)
PLATELET # BLD AUTO: 233 10(3)UL (ref 150–450)
POTASSIUM SERPL-SCNC: 3.7 MMOL/L (ref 3.5–5.1)
RBC # BLD AUTO: 2.65 X10(6)UL
SODIUM SERPL-SCNC: 139 MMOL/L (ref 136–145)
WBC # BLD AUTO: 8.3 X10(3) UL (ref 4–11)

## 2023-12-08 PROCEDURE — 99232 SBSQ HOSP IP/OBS MODERATE 35: CPT | Performed by: STUDENT IN AN ORGANIZED HEALTH CARE EDUCATION/TRAINING PROGRAM

## 2023-12-08 RX ORDER — AMLODIPINE BESYLATE 5 MG/1
10 TABLET ORAL DAILY
Status: DISCONTINUED | OUTPATIENT
Start: 2023-12-08 | End: 2023-12-09

## 2023-12-08 RX ORDER — LOSARTAN POTASSIUM 100 MG/1
100 TABLET ORAL NIGHTLY
Status: DISCONTINUED | OUTPATIENT
Start: 2023-12-08 | End: 2023-12-09

## 2023-12-08 RX ORDER — FENOFIBRATE 134 MG/1
134 CAPSULE ORAL
Status: DISCONTINUED | OUTPATIENT
Start: 2023-12-08 | End: 2023-12-09

## 2023-12-08 NOTE — OPERATIVE REPORT
Operative Note     Asa De Souza All  Date: 12/8/2023  Preoperative Diagnosis:     Lumbar Degenerative Spondylolisthesis  Lumbar Foraminal Stenosis with Radiculopathy  Segmental Kyphosis  Degenerative scoliosis     Postoperative Diagnosis: Same  Primary Surgeon: Rosamaria Rodriguez MD  Co-Surgeon - Dr.Luu FERGUSON   Assistant: Nilson DC     Procedures: Stage 1 :  L4-S1 ALIF and anterior instrumentation   - Anterior Lumbar Interbody Fusion (L4-5, L5-S1 )    CPT 73178, 15335  - Biomechanical Device       CPT 22853 x 2  - Anterior Instrumentation L4 and L5 for stabilization of interbody device CPT 92318    - Use of Local Bone Autograft                            CPT 62407   - Use of Allograft Material        CPT 52082      Anesthesia: General Endotracheal Anesthesia  Estimated Blood Loss: 50cc  Drains: NA  Implants: Atec IdentiTi ALIF Large footprint 15 deg 14 height L5-S1, large foot print 10 deg 12 height,  [place] x 2 and 25mm screws x 4, 10mm AMP plate x 2 and 90YD screws  Bone Graft: DBM Autograft allograft   Specimen: None  Condition: Stable  Complications: None      Surgical Indications:Austyn Ramírez is a  58year old malewho presented with a history of lumbar radiculopathy and degenerative lumbar disease with symptoms refractory to nonsurgical care. The option of surgery was discussed with the patient. Risks, benefits, and alternatives of surgery were discussed with the patient, which include but are not limited to bleeding, infection, DVT/PE, dural tears, neurologic injury, worsening of neurological status, risk of instability, need for subsequent surgery, fracture, failure of fusion, hardware failure, risks associated with anesthesia, including death, which were all reviewed with the patient and she consented to proceed with surgery. Patient is to undergo staged surgery - this is stage 1 of the two stage surgery. Surgical Procedure:               The patient was met in the preop holding area, we reviewed elements of the patient's history and physical examination along with the planned surgical procedure. After successful induction of general endotracheal anesthesia, a garcía catheter was placed and sequential compression devices were applied to bilateral lower extremities. The patient was then placed in the supine position. Dr. Angie Cordero describes the anterior positioning, draping, and approach to the lumbar spine with all the details in his operative report. Once the approach was complete, attention was turned toward interbody fusion. Complete discectomy was conducted at L4-L5 with use of a scalpel to complete the annulotomy and a combination of discectomy tools including curettes, zielke, pituitary and Kerrison. The disc space was rasped. Trial sizers were placed and appropriate size interbody fusion device packed and placed and checked under fluoroscopic guidance in the AP and lateral planes. Integrated screws were then placed, through the screw holes to secure the spacer to the bone. Next an appropriate sized separate plate that is not integral to the cage was selected and applied. Screw was then placed only through the plate and not through the cages, and a radiograph obtained to confirm position of the graft and evaluate plate fixation      Additional levels at L5-S1 were treated in similar fashion. The construct was the evaluated fluoroscopically and determined to be appropriate. All bleeders were controlled using bipolar electrocautery and floseal.  There was no active bleeding. Closure was done in layers with interrupted 0 Vicryl for the fascia, 2-0 Vicryl for the subcutaneous tissue. The subcutaneous layer was injected with 0.375% Marcaine and the skin was closed with a Monocryl suture. Dermabond and a sterile dressing were then applied. The patient was woken from anesthesia and transferred to the PACU in stable condition.                    A physician assistant was necessary during the case to provide positioning, exposure, hemostasis, safety and retraction and to manage wound suction so that I could operate with two hands      Tono Cohen  Division of Spine Surgery  [address]

## 2023-12-08 NOTE — PLAN OF CARE
Patient is alert and oriented x4. VSS on RA. No complaint of numbness or tingling. Pulses bilateral +2. Appropriate strength and mobility in lower extremities. Pain controlled at rest with PRN meds. Incision is intact with 4x4 / tegaderm. PT/OT to see. Tolerates food and beverages well. Voids appropriately, last bowel movement was 12/6. IS and ankle pumps encouraged. Belongings within reach. Encouraged to call for any needs.

## 2023-12-08 NOTE — PLAN OF CARE
Pt A&Ox4 vital signs stable on RA. POD#1. Pain managed with PRN medication. Tele (NSR), , SCD's, [place] w/ CPAP. IS encouraged, Tolerating diet. Voiding via garcía. Dressing site C/D/I. PT/OT to see. Plan for part two surgery on 12/12/23. POC discussed, pt verbalized understanding. No further questions at this time. Call light within reach.

## 2023-12-08 NOTE — PLAN OF CARE
Patient is alert and oriented x4. VSS on RA. No complaint of numbness or tingling. Pulses bilateral +2. Appropriate strength and mobility in lower extremities. Pain well controlled while at rest. Incision is intact with guaze / tegaderm, CDI. Patient ambulates stand by w/ walker. Tolerates food and beverages well. Cerda in place, last bowel movement was 12/6. IS and ankle pumps encouraged. Belongings within reach. Encouraged to call for any needs.

## 2023-12-08 NOTE — PHYSICAL THERAPY NOTE
PHYSICAL THERAPY EVALUATION - INPATIENT     Room Number: 368/368-A  Evaluation Date: 12/8/2023  Type of Evaluation: Initial  Physician Order: PT Eval and Treat    Presenting Problem: s/p anterior lumbar fusion L4-L5, L5-S1 12/7/23 (2nd surgery scheduled for 12/12/23)  Co-Morbidities : MS, HTN,colon cancer,R THR 2020  Reason for Therapy: Mobility Dysfunction and Discharge Planning      ASSESSMENT   Pt is a 58year old male admitted on 12/7/2023 for above surgery. Functional outcome measures completed include [facility]. The AM-PAC '6-Clicks' Inpatient Basic Mobility Short Form was completed and this patient is demonstrating a Approx Degree of Impairment: 41.77%  degree of impairment in mobility. Research supports that patients with this level of impairment may benefit from discharge home with intermittent supervision. Pt may need new orders for PT re-eval after 2nd surgery scheduled 12/12/23. PT Discharge Recommendations: Home; Intermittent Supervision      PLAN  Patient has been evaluated and presents with no skilled Physical Therapy needs at this time. Patient discharged from Physical Therapy services. Please re-order if a new functional limitation presents during this admission. GOALS  Patient was able to achieve the following goals . .. Patient was able to transfer Safely and with supervision with RW   Patient able to ambulate on level surfaces Safely and with supervision with RW         HOME SITUATION  Type of Home: House   Home Layout: Able to live on main level; Two level (2nd floor shower, pt has been sleeping in recliner on main level)  Stairs to Enter : 2  Railing: No  Stairs to Bedroom: 13  Railing: Yes    Lives With: Spouse  Drives: Yes  Patient Owned Equipment: Rolling walker;Cane  Patient Regularly Uses: Glasses    Prior Level of [place]: per pt reports, pt has RW and cane but does not use and is able to amb without assistance.  Pt reports he has been sleeping in recliner on main floor of 2 story home due to discomfort sleeping in bed. Pt lives with wife who can assist with needs as well as family that live close by. 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 SUBJECTIVE  \"I feel pretty good. But we'll see after the next surgery. \"      OBJECTIVE  Precautions: Spine  Fall Risk: Standard fall risk    WEIGHT BEARING RESTRICTION  Weight Bearing Restriction: None                PAIN ASSESSMENT  Rating: Unable to rate  Location: surgical site, R low back  Management Techniques: Activity promotion    COGNITION  Overall Cognitive Status:  WFL - within functional limits    RANGE OF MOTION AND STRENGTH ASSESSMENT  Upper extremity ROM and strength are within functional limits     Lower extremity ROM is within functional limits     Lower extremity strength is within functional limits       BALANCE  Static Sitting: Good  Dynamic Sitting: Good  Static Standing: Fair  Dynamic Standing: Fair -    ADDITIONAL TESTS                                    ACTIVITY TOLERANCE                         O2 WALK       NEUROLOGICAL FINDINGS  Neurological Findings: Sensation           Sensation: B LE's intact to light touch grossly         AM-PAC '6-Clicks' INPATIENT SHORT FORM - BASIC MOBILITY  How much difficulty does the patient currently have. .. Patient Difficulty: Turning over in bed (including adjusting bedclothes, sheets and blankets)?: None   Patient Difficulty: Sitting down on and standing up from a chair with arms (e.g., wheelchair, bedside commode, etc.): A Little   Patient Difficulty: Moving from lying on back to sitting on the side of the bed?: A Little   How much help from another person does the patient currently need. ..    Help from Another: Moving to and from a bed to a chair (including a wheelchair)?: A Little   Help from Another: Need to walk in hospital room?: A Little   Help from Another: Climbing 3-5 steps with a railing?: A Little       AM-PAC Score:  Raw Score: 19   Approx Degree of Impairment: 41.77%   Standardized Score (AM-PAC Scale): 45.44   CMS Modifier (G-Code): CK    FUNCTIONAL ABILITY STATUS  Gait Assessment   Functional Mobility/Gait Assessment  Gait Assistance: Supervision  Distance (ft): 150  Assistive Device: Rolling walker  Pattern: Within Functional Limits  Stairs: Stairs  How Many Stairs: 4  Device: 2 Rails  Assist: Contact guard assist  Pattern: Ascend and Descend  Ascend and Descend : Step to    Skilled Therapy Provided     Bed Mobility:  Rolling: supervision  Supine to sit: supervision   Sit to supine: supervision     Transfer Mobility:  Sit to stand: supervision   Stand to sit: supervision  Gait = pt amb x 150 feet with RW with CGA initially and progressed to supervision, steady gait no LOB, narrow RAOUL at times. Therapist's comments:per RN pt ok to be seen. Pt received in bed and agreeable to therapy. pt educated on spine precautions and ankle pumps for DVT prevention. Pt mobility as above. Pt denies dizziness and vitals monitored and stable. Pt amb to therapy gym and able to ascend/descend 4 steps with supervision and 2 handrails with step to method. Pt denies need for car transfer trg at this time. Pt returns to room and is seated in bedside chair. Pt educated on call don't fall, questions answered, and RN updated. Exercise/Education Provided:  Bed mobility  Functional activity tolerated  Gait training  Posture  Lower therapeutic exercise: Ankle pumps  Transfer training    Patient End of Session: Up in chair;Needs met;Call light within reach;RN aware of session/findings; All patient questions and concerns addressed    Patient Evaluation Complexity Level:  History Moderate - 1 or 2 personal factors and/or co-morbidities   Examination of body systems Low - addressing 1-2 elements   Clinical Presentation Low - Stable   Clinical Decision Making Low Complexity       PT Session Time: 30 minutes  Gait Training: 15 minutes

## 2023-12-09 VITALS
HEIGHT: 72 IN | HEART RATE: 89 BPM | RESPIRATION RATE: 15 BRPM | BODY MASS INDEX: 23.7 KG/M2 | WEIGHT: 175 LBS | DIASTOLIC BLOOD PRESSURE: 75 MMHG | SYSTOLIC BLOOD PRESSURE: 151 MMHG | TEMPERATURE: 99 F | OXYGEN SATURATION: 100 %

## 2023-12-09 PROCEDURE — 99232 SBSQ HOSP IP/OBS MODERATE 35: CPT | Performed by: STUDENT IN AN ORGANIZED HEALTH CARE EDUCATION/TRAINING PROGRAM

## 2023-12-09 RX ORDER — SODIUM CHLORIDE, SODIUM LACTATE, POTASSIUM CHLORIDE, CALCIUM CHLORIDE 600; 310; 30; 20 MG/100ML; MG/100ML; MG/100ML; MG/100ML
INJECTION, SOLUTION INTRAVENOUS CONTINUOUS
Status: DISCONTINUED | OUTPATIENT
Start: 2023-12-09 | End: 2023-12-09

## 2023-12-09 NOTE — PROGRESS NOTES
12/08/23 2140   BiPAP   $ RT Standby Charge (per 15 min) 1   Device Dream Station   BiPAP / CPAP CE# C6   BiPAP bacteria filter Yes   BIPAP plugged into main power? Yes   Mode CPAP   Interface Patient provided mask   Control Settings   Set CPAP 10   SIPAP   Resp 14   BiPAP/CPAP Monitored Parameters   Pulse 86   SpO2 97 %   Toleration Well     Patient on CPAP with above settings and tolerating well.      David Guerrero  RT

## 2023-12-09 NOTE — PROGRESS NOTES
NURSING DISCHARGE NOTE    Discharged Home via Wheelchair. Accompanied by Family member  Belongings Taken by patient/family. IV's removed. Discharge instructions reviewed, pt to return 12/12 for second part of surgery. Home med returned from pt's bin. Pt and family stated understanding of instructions, pt discharged home via wheelchair.

## 2023-12-09 NOTE — PLAN OF CARE
Pt a/ox4 denies any significant pain when assessed CMS intact to BLE   Up with assist for ambulation and transfers. Spoke with PA earlier this shift, asked specifically about keeping or removing garcía. Per PA, plan to keep catheter in place until second surgery planned on 12/12. CAROLINA wirh CPAP at night, otherwise on room air.    Plan OR 12/12 with Dr Paul Arechiga for second part of pt surgery

## 2023-12-09 NOTE — PLAN OF CARE
Patient alert and oriented x4. VSS, on RA. Mild/moderate pain reported to lower abdomen, incision site. PO PRN pain medication given with stated relief. Pt reports baseline N/T, no change from his baseline. Gauze and tegaderm dressing, CDI. Patient up x1 person assist, ambulating without difficulty. Cerda removed, pt DTV. Plan: discharge home after voiding.

## 2023-12-10 LAB
BLOOD TYPE BARCODE: 5100
UNIT VOLUME: 350 ML

## 2023-12-11 ENCOUNTER — PATIENT OUTREACH (OUTPATIENT)
Dept: CASE MANAGEMENT | Age: 63
End: 2023-12-11

## 2023-12-11 ENCOUNTER — ANESTHESIA EVENT (OUTPATIENT)
Dept: SURGERY | Facility: HOSPITAL | Age: 63
End: 2023-12-11
Payer: COMMERCIAL

## 2023-12-11 NOTE — OPERATIVE REPORT
[facility]     Operative Report    Patient Name:  Dannielle Ford  MR:  [id number]  :  1960  DOS:  23    Preop Dx:    Lumbar Degenerative Spondylolisthesis  Lumbar Foraminal Stenosis with Radiculopathy  Segmental Kyphosis  Degenerative scoliosis     Postop Dx:    Lumbar Degenerative Spondylolisthesis  Lumbar Foraminal Stenosis with Radiculopathy  Segmental Kyphosis  Degenerative scoliosis     Procedure:    Anterior lumbar interbody fusion, L4-L5 (81114-74, 22)  Anterior lumbar interbody fusion L5 to S1 (28409-40)  Anterior lumbar instrumentation (47310-55)  Anterior insertion of interbody biomechanical device at L4-L5 level (97597-19)  Anterior insertion of interbody biomechanical device at L5-S1 level (74960-23)  Ureterolysis (24853)  Real-time intraoperative C-arm fluoroscopy with image guidance and surgeon interpretation (59478)    Surgeon:  Sridhar Maldonado MD, Vic Arredondo MD  Surgical Assistant: DAO Bueno, MAURI  EBL: Blood Output: 50 mL (2023 02:58 AM)    Complication:  None    Description:    Pt is a 58year old male with Lumbar Degenerative Spondylolisthesis, Lumbar Foraminal Stenosis with Radiculopathy, Segmental Kyphosis and Degenerative scoliosis  who is undergoing ALIFs with Dr. Vic Arredondo. General surgery was asked to assist in exposing the anterior disc spaces. The patient was taken to the OR and placed in supine position. General anesthesia was established and the abdomen was prepped in standard fashion. Fluoroscopy was used to татьяна the L4-S1 levels. A vertical incision was made over the left lower quadrant. The anterior left rectus fascia was incised and the left rectus muscle was mobilized medially. A vertical incision was made in the transversalis fascia and the retroperitoneum was entered. The peritoneum and left ureter were identified and mobilized medially.   Ureterolysis was performed safely to allow full mobilization of the left ureter to the right side of the spinal column. Just medial to the left iliac vein, we dissected the prevertebral space. The median sacral vessels were divided to further expose the L5-S1 disc space. The retractors were adjusted cephalad. The left iliac vessels were mobilized medially. The ascending lumbar vein was identified and divided to allow further medial mobilization of the iliac vessels. This portion of the procedure is tedious and laborious compared to the L5-S1 exposure thus warrants a modifier 22. The retractors were adjusted to expose the L4-L5 disc space. Fluoroscopy was used to confirm the L4-L5 level. The case was turned over to Dr. Mary Julien and I assisted with the discectomy and implant placement. After fluoroscopic confirmation of the implant placement at L4-L5, the retractors were adjusted caudad to expose the anterior disc space at L5-S1. Fluoroscopy was used to confirm the L5-S1 level. The case was turned over to Dr. Mary Julien and I assisted with the discectomy and implant placement. After fluoroscopic confirmation of the implant placement at L5-S1, the operative field was irrigated with saline. Adequate hemostasis was seen. The retractors were slowly removed and the operative field remained hemostatic. There were no injury to the left ureter, peritoneal contents or the sympathetic chain during our dissection. The pulse ox on the left toe showed a normal signal at the end of the procedure. A left transversus abdominus plane block was performed using 0.5% marcaine. The anterior fascia was closed using 0 looped PDS. We closed the subcutaneous tissue and skin. All instrument and sponge counts were correct. I was present to expose the anterior disc spaces.     Oscar Parrish MD

## 2023-12-12 ENCOUNTER — HOSPITAL ENCOUNTER (INPATIENT)
Facility: HOSPITAL | Age: 63
LOS: 3 days | Discharge: HOME | End: 2023-12-15
Attending: ORTHOPAEDIC SURGERY | Admitting: ORTHOPAEDIC SURGERY
Payer: COMMERCIAL

## 2023-12-12 ENCOUNTER — APPOINTMENT (OUTPATIENT)
Dept: GENERAL RADIOLOGY | Facility: HOSPITAL | Age: 63
End: 2023-12-12
Attending: ORTHOPAEDIC SURGERY
Payer: COMMERCIAL

## 2023-12-12 ENCOUNTER — ANESTHESIA (OUTPATIENT)
Dept: SURGERY | Facility: HOSPITAL | Age: 63
End: 2023-12-12
Payer: COMMERCIAL

## 2023-12-12 PROBLEM — M48.061 SPINAL STENOSIS, LUMBAR: Status: ACTIVE | Noted: 2023-12-12

## 2023-12-12 LAB
ANTIBODY SCREEN: NEGATIVE
BASE EXCESS BLD CALC-SCNC: -1 MMOL/L
BASE EXCESS BLD CALC-SCNC: 0 MMOL/L
BASOPHILS # BLD AUTO: 0.02 X10(3) UL (ref 0–0.2)
BASOPHILS NFR BLD AUTO: 0.2 %
CA-I BLD-SCNC: 0.61 MMOL/L (ref 1.12–1.32)
CA-I BLD-SCNC: 0.62 MMOL/L (ref 1.12–1.32)
CA-I BLD-SCNC: 0.64 MMOL/L (ref 1.12–1.32)
CA-I BLD-SCNC: 0.65 MMOL/L (ref 1.12–1.32)
CO2 BLD-SCNC: 25 MMOL/L (ref 22–32)
CO2 BLD-SCNC: 26 MMOL/L (ref 22–32)
EOSINOPHIL # BLD AUTO: 0 X10(3) UL (ref 0–0.7)
EOSINOPHIL NFR BLD AUTO: 0 %
ERYTHROCYTE [DISTWIDTH] IN BLOOD BY AUTOMATED COUNT: 15.6 %
GLUCOSE BLD-MCNC: 129 MG/DL (ref 70–99)
GLUCOSE BLD-MCNC: 155 MG/DL (ref 70–99)
GLUCOSE BLD-MCNC: 161 MG/DL (ref 70–99)
GLUCOSE BLD-MCNC: 172 MG/DL (ref 70–99)
HCO3 BLD-SCNC: 23.4 MEQ/L
HCO3 BLD-SCNC: 23.8 MEQ/L
HCO3 BLD-SCNC: 24 MEQ/L
HCO3 BLD-SCNC: 24.5 MEQ/L
HCT VFR BLD AUTO: 34.5 %
HCT VFR BLD CALC: 25 %
HCT VFR BLD CALC: 26 %
HCT VFR BLD CALC: 28 %
HCT VFR BLD CALC: 31 %
HGB BLD-MCNC: 11.3 G/DL
IMM GRANULOCYTES # BLD AUTO: 0.09 X10(3) UL (ref 0–1)
IMM GRANULOCYTES NFR BLD: 0.8 %
LYMPHOCYTES # BLD AUTO: 0.59 X10(3) UL (ref 1–4)
LYMPHOCYTES NFR BLD AUTO: 5.4 %
MCH RBC QN AUTO: 28.5 PG (ref 26–34)
MCHC RBC AUTO-ENTMCNC: 32.8 G/DL (ref 31–37)
MCV RBC AUTO: 87.1 FL
MONOCYTES # BLD AUTO: 0.93 X10(3) UL (ref 0.1–1)
MONOCYTES NFR BLD AUTO: 8.5 %
NEUTROPHILS # BLD AUTO: 9.33 X10 (3) UL (ref 1.5–7.7)
NEUTROPHILS # BLD AUTO: 9.33 X10(3) UL (ref 1.5–7.7)
NEUTROPHILS NFR BLD AUTO: 85.1 %
PCO2 BLD: 33 MMHG
PCO2 BLD: 33.8 MMHG
PCO2 BLD: 36.1 MMHG
PCO2 BLD: 36.4 MMHG
PH BLD: 7.42 [PH]
PH BLD: 7.44 [PH]
PH BLD: 7.46 [PH]
PH BLD: 7.47 [PH]
PLATELET # BLD AUTO: 279 10(3)UL (ref 150–450)
PO2 BLD: 263 MMHG
PO2 BLD: 278 MMHG
PO2 BLD: 341 MMHG
PO2 BLD: 364 MMHG
POTASSIUM BLD-SCNC: 3.5 MMOL/L (ref 3.6–5.1)
POTASSIUM BLD-SCNC: 3.6 MMOL/L (ref 3.6–5.1)
RBC # BLD AUTO: 3.96 X10(6)UL
RH BLOOD TYPE: POSITIVE
SAO2 % BLD: 100 %
SODIUM BLD-SCNC: 136 MMOL/L (ref 136–145)
WBC # BLD AUTO: 11 X10(3) UL (ref 4–11)

## 2023-12-12 PROCEDURE — 01NB0ZZ RELEASE LUMBAR NERVE, OPEN APPROACH: ICD-10-PCS | Performed by: ORTHOPAEDIC SURGERY

## 2023-12-12 PROCEDURE — 76000 FLUOROSCOPY <1 HR PHYS/QHP: CPT | Performed by: ORTHOPAEDIC SURGERY

## 2023-12-12 PROCEDURE — 36430 TRANSFUSION BLD/BLD COMPNT: CPT | Performed by: ANESTHESIOLOGY

## 2023-12-12 PROCEDURE — 0ST20ZZ RESECTION OF LUMBAR VERTEBRAL DISC, OPEN APPROACH: ICD-10-PCS | Performed by: ORTHOPAEDIC SURGERY

## 2023-12-12 PROCEDURE — 0RGA071 FUSION OF THORACOLUMBAR VERTEBRAL JOINT WITH AUTOLOGOUS TISSUE SUBSTITUTE, POSTERIOR APPROACH, POSTERIOR COLUMN, OPEN APPROACH: ICD-10-PCS | Performed by: ORTHOPAEDIC SURGERY

## 2023-12-12 PROCEDURE — 0SG1071 FUSION OF 2 OR MORE LUMBAR VERTEBRAL JOINTS WITH AUTOLOGOUS TISSUE SUBSTITUTE, POSTERIOR APPROACH, POSTERIOR COLUMN, OPEN APPROACH: ICD-10-PCS | Performed by: ORTHOPAEDIC SURGERY

## 2023-12-12 PROCEDURE — 0SG10A0 FUSION OF 2 OR MORE LUMBAR VERTEBRAL JOINTS WITH INTERBODY FUSION DEVICE, ANTERIOR APPROACH, ANTERIOR COLUMN, OPEN APPROACH: ICD-10-PCS | Performed by: ORTHOPAEDIC SURGERY

## 2023-12-12 PROCEDURE — 0RG7071 FUSION OF 2 TO 7 THORACIC VERTEBRAL JOINTS WITH AUTOLOGOUS TISSUE SUBSTITUTE, POSTERIOR APPROACH, POSTERIOR COLUMN, OPEN APPROACH: ICD-10-PCS | Performed by: ORTHOPAEDIC SURGERY

## 2023-12-12 PROCEDURE — 4A11X4G MONITORING OF PERIPHERAL NERVOUS ELECTRICAL ACTIVITY, INTRAOPERATIVE, EXTERNAL APPROACH: ICD-10-PCS | Performed by: ORTHOPAEDIC SURGERY

## 2023-12-12 PROCEDURE — 0SG3071 FUSION OF LUMBOSACRAL JOINT WITH AUTOLOGOUS TISSUE SUBSTITUTE, POSTERIOR APPROACH, POSTERIOR COLUMN, OPEN APPROACH: ICD-10-PCS | Performed by: ORTHOPAEDIC SURGERY

## 2023-12-12 PROCEDURE — 99223 1ST HOSP IP/OBS HIGH 75: CPT | Performed by: INTERNAL MEDICINE

## 2023-12-12 RX ORDER — CEFAZOLIN SODIUM/WATER 2 G/20 ML
SYRINGE (ML) INTRAVENOUS
Status: DISPENSED
Start: 2023-12-12 | End: 2023-12-12

## 2023-12-12 RX ORDER — ENEMA 19; 7 G/133ML; G/133ML
1 ENEMA RECTAL ONCE AS NEEDED
Status: DISCONTINUED | OUTPATIENT
Start: 2023-12-12 | End: 2023-12-15

## 2023-12-12 RX ORDER — FENOFIBRATE 134 MG/1
134 CAPSULE ORAL
Status: DISCONTINUED | OUTPATIENT
Start: 2023-12-13 | End: 2023-12-15

## 2023-12-12 RX ORDER — HYDROCODONE BITARTRATE AND ACETAMINOPHEN 5; 325 MG/1; MG/1
1 TABLET ORAL ONCE AS NEEDED
Status: DISCONTINUED | OUTPATIENT
Start: 2023-12-12 | End: 2023-12-12 | Stop reason: HOSPADM

## 2023-12-12 RX ORDER — DEXTROSE MONOHYDRATE 25 G/50ML
50 INJECTION, SOLUTION INTRAVENOUS
Status: DISCONTINUED | OUTPATIENT
Start: 2023-12-12 | End: 2023-12-12 | Stop reason: HOSPADM

## 2023-12-12 RX ORDER — LOSARTAN POTASSIUM 100 MG/1
100 TABLET ORAL NIGHTLY
Status: DISCONTINUED | OUTPATIENT
Start: 2023-12-12 | End: 2023-12-15

## 2023-12-12 RX ORDER — HYDROMORPHONE HYDROCHLORIDE 1 MG/ML
0.6 INJECTION, SOLUTION INTRAMUSCULAR; INTRAVENOUS; SUBCUTANEOUS EVERY 5 MIN PRN
Status: DISCONTINUED | OUTPATIENT
Start: 2023-12-12 | End: 2023-12-12 | Stop reason: HOSPADM

## 2023-12-12 RX ORDER — ATORVASTATIN CALCIUM 20 MG/1
20 TABLET, FILM COATED ORAL NIGHTLY
Status: DISCONTINUED | OUTPATIENT
Start: 2023-12-12 | End: 2023-12-15

## 2023-12-12 RX ORDER — CEFAZOLIN SODIUM/WATER 2 G/20 ML
2 SYRINGE (ML) INTRAVENOUS ONCE
Status: COMPLETED | OUTPATIENT
Start: 2023-12-12 | End: 2023-12-12

## 2023-12-12 RX ORDER — LABETALOL HYDROCHLORIDE 5 MG/ML
INJECTION, SOLUTION INTRAVENOUS AS NEEDED
Status: DISCONTINUED | OUTPATIENT
Start: 2023-12-12 | End: 2023-12-12 | Stop reason: SURG

## 2023-12-12 RX ORDER — PROCHLORPERAZINE EDISYLATE 5 MG/ML
5 INJECTION INTRAMUSCULAR; INTRAVENOUS EVERY 8 HOURS PRN
Status: DISCONTINUED | OUTPATIENT
Start: 2023-12-12 | End: 2023-12-15

## 2023-12-12 RX ORDER — HYDROMORPHONE HYDROCHLORIDE 1 MG/ML
0.2 INJECTION, SOLUTION INTRAMUSCULAR; INTRAVENOUS; SUBCUTANEOUS EVERY 5 MIN PRN
Status: DISCONTINUED | OUTPATIENT
Start: 2023-12-12 | End: 2023-12-12 | Stop reason: HOSPADM

## 2023-12-12 RX ORDER — ACETAMINOPHEN 500 MG
1000 TABLET ORAL ONCE
Status: DISCONTINUED | OUTPATIENT
Start: 2023-12-12 | End: 2023-12-12 | Stop reason: HOSPADM

## 2023-12-12 RX ORDER — VANCOMYCIN HYDROCHLORIDE 1 G/20ML
INJECTION, POWDER, LYOPHILIZED, FOR SOLUTION INTRAVENOUS AS NEEDED
Status: DISCONTINUED | OUTPATIENT
Start: 2023-12-12 | End: 2023-12-12 | Stop reason: HOSPADM

## 2023-12-12 RX ORDER — PROCHLORPERAZINE EDISYLATE 5 MG/ML
5 INJECTION INTRAMUSCULAR; INTRAVENOUS EVERY 8 HOURS PRN
Status: DISCONTINUED | OUTPATIENT
Start: 2023-12-12 | End: 2023-12-12 | Stop reason: HOSPADM

## 2023-12-12 RX ORDER — TERIFLUNOMIDE 14 MG/1
14 TABLET, FILM COATED ORAL DAILY
Status: DISCONTINUED | OUTPATIENT
Start: 2023-12-12 | End: 2023-12-15

## 2023-12-12 RX ORDER — AMLODIPINE BESYLATE 5 MG/1
10 TABLET ORAL DAILY
Status: DISCONTINUED | OUTPATIENT
Start: 2023-12-13 | End: 2023-12-15

## 2023-12-12 RX ORDER — NALOXONE HYDROCHLORIDE 0.4 MG/ML
0.08 INJECTION, SOLUTION INTRAMUSCULAR; INTRAVENOUS; SUBCUTANEOUS AS NEEDED
Status: DISCONTINUED | OUTPATIENT
Start: 2023-12-12 | End: 2023-12-12 | Stop reason: HOSPADM

## 2023-12-12 RX ORDER — HYDROMORPHONE HYDROCHLORIDE 1 MG/ML
0.8 INJECTION, SOLUTION INTRAMUSCULAR; INTRAVENOUS; SUBCUTANEOUS EVERY 2 HOUR PRN
Status: DISCONTINUED | OUTPATIENT
Start: 2023-12-12 | End: 2023-12-15

## 2023-12-12 RX ORDER — SODIUM CHLORIDE 9 MG/ML
INJECTION, SOLUTION INTRAVENOUS CONTINUOUS PRN
Status: DISCONTINUED | OUTPATIENT
Start: 2023-12-12 | End: 2023-12-12 | Stop reason: SURG

## 2023-12-12 RX ORDER — DIPHENHYDRAMINE HYDROCHLORIDE 50 MG/ML
25 INJECTION INTRAMUSCULAR; INTRAVENOUS EVERY 4 HOURS PRN
Status: DISCONTINUED | OUTPATIENT
Start: 2023-12-12 | End: 2023-12-15

## 2023-12-12 RX ORDER — MIDAZOLAM HYDROCHLORIDE 1 MG/ML
INJECTION INTRAMUSCULAR; INTRAVENOUS AS NEEDED
Status: DISCONTINUED | OUTPATIENT
Start: 2023-12-12 | End: 2023-12-12 | Stop reason: SURG

## 2023-12-12 RX ORDER — ONDANSETRON 2 MG/ML
INJECTION INTRAMUSCULAR; INTRAVENOUS AS NEEDED
Status: DISCONTINUED | OUTPATIENT
Start: 2023-12-12 | End: 2023-12-12 | Stop reason: SURG

## 2023-12-12 RX ORDER — BISACODYL 10 MG
10 SUPPOSITORY, RECTAL RECTAL
Status: DISCONTINUED | OUTPATIENT
Start: 2023-12-12 | End: 2023-12-15

## 2023-12-12 RX ORDER — HYDROMORPHONE HYDROCHLORIDE 1 MG/ML
INJECTION, SOLUTION INTRAMUSCULAR; INTRAVENOUS; SUBCUTANEOUS
Status: COMPLETED
Start: 2023-12-12 | End: 2023-12-12

## 2023-12-12 RX ORDER — DOCUSATE SODIUM 100 MG/1
100 CAPSULE, LIQUID FILLED ORAL 2 TIMES DAILY
Status: DISCONTINUED | OUTPATIENT
Start: 2023-12-12 | End: 2023-12-15

## 2023-12-12 RX ORDER — PANTOPRAZOLE SODIUM 40 MG/1
40 TABLET, DELAYED RELEASE ORAL
Status: DISCONTINUED | OUTPATIENT
Start: 2023-12-13 | End: 2023-12-15

## 2023-12-12 RX ORDER — LABETALOL HYDROCHLORIDE 5 MG/ML
5 INJECTION, SOLUTION INTRAVENOUS EVERY 5 MIN PRN
Status: DISCONTINUED | OUTPATIENT
Start: 2023-12-12 | End: 2023-12-12 | Stop reason: HOSPADM

## 2023-12-12 RX ORDER — NICOTINE POLACRILEX 4 MG
15 LOZENGE BUCCAL
Status: DISCONTINUED | OUTPATIENT
Start: 2023-12-12 | End: 2023-12-12 | Stop reason: HOSPADM

## 2023-12-12 RX ORDER — PREGABALIN 50 MG/1
50 CAPSULE ORAL 2 TIMES DAILY
Status: DISCONTINUED | OUTPATIENT
Start: 2023-12-12 | End: 2023-12-15

## 2023-12-12 RX ORDER — POLYETHYLENE GLYCOL 3350 17 G/17G
17 POWDER, FOR SOLUTION ORAL DAILY PRN
Status: DISCONTINUED | OUTPATIENT
Start: 2023-12-12 | End: 2023-12-15

## 2023-12-12 RX ORDER — KETAMINE HYDROCHLORIDE 50 MG/ML
INJECTION, SOLUTION, CONCENTRATE INTRAMUSCULAR; INTRAVENOUS AS NEEDED
Status: DISCONTINUED | OUTPATIENT
Start: 2023-12-12 | End: 2023-12-12 | Stop reason: SURG

## 2023-12-12 RX ORDER — MIDAZOLAM HYDROCHLORIDE 1 MG/ML
1 INJECTION INTRAMUSCULAR; INTRAVENOUS EVERY 5 MIN PRN
Status: DISCONTINUED | OUTPATIENT
Start: 2023-12-12 | End: 2023-12-12 | Stop reason: HOSPADM

## 2023-12-12 RX ORDER — PHENYLEPHRINE HCL 10 MG/ML
VIAL (ML) INJECTION AS NEEDED
Status: DISCONTINUED | OUTPATIENT
Start: 2023-12-12 | End: 2023-12-12 | Stop reason: SURG

## 2023-12-12 RX ORDER — SENNOSIDES 8.6 MG
17.2 TABLET ORAL NIGHTLY
Status: DISCONTINUED | OUTPATIENT
Start: 2023-12-12 | End: 2023-12-15

## 2023-12-12 RX ORDER — HYDRALAZINE HYDROCHLORIDE 20 MG/ML
INJECTION INTRAMUSCULAR; INTRAVENOUS AS NEEDED
Status: DISCONTINUED | OUTPATIENT
Start: 2023-12-12 | End: 2023-12-12 | Stop reason: SURG

## 2023-12-12 RX ORDER — CEFAZOLIN SODIUM/WATER 2 G/20 ML
2 SYRINGE (ML) INTRAVENOUS EVERY 8 HOURS
Qty: 40 ML | Refills: 0 | Status: COMPLETED | OUTPATIENT
Start: 2023-12-12 | End: 2023-12-13

## 2023-12-12 RX ORDER — METHOCARBAMOL 100 MG/ML
INJECTION, SOLUTION INTRAMUSCULAR; INTRAVENOUS AS NEEDED
Status: DISCONTINUED | OUTPATIENT
Start: 2023-12-12 | End: 2023-12-12 | Stop reason: SURG

## 2023-12-12 RX ORDER — HYDROMORPHONE HYDROCHLORIDE 1 MG/ML
0.4 INJECTION, SOLUTION INTRAMUSCULAR; INTRAVENOUS; SUBCUTANEOUS EVERY 5 MIN PRN
Status: DISCONTINUED | OUTPATIENT
Start: 2023-12-12 | End: 2023-12-12 | Stop reason: HOSPADM

## 2023-12-12 RX ORDER — METHOCARBAMOL 750 MG/1
750 TABLET, FILM COATED ORAL EVERY 6 HOURS PRN
Status: DISCONTINUED | OUTPATIENT
Start: 2023-12-12 | End: 2023-12-15

## 2023-12-12 RX ORDER — MEPERIDINE HYDROCHLORIDE 25 MG/ML
12.5 INJECTION INTRAMUSCULAR; INTRAVENOUS; SUBCUTANEOUS AS NEEDED
Status: DISCONTINUED | OUTPATIENT
Start: 2023-12-12 | End: 2023-12-12 | Stop reason: HOSPADM

## 2023-12-12 RX ORDER — DEXAMETHASONE SODIUM PHOSPHATE 4 MG/ML
VIAL (ML) INJECTION AS NEEDED
Status: DISCONTINUED | OUTPATIENT
Start: 2023-12-12 | End: 2023-12-12 | Stop reason: SURG

## 2023-12-12 RX ORDER — ACETAMINOPHEN 10 MG/ML
INJECTION, SOLUTION INTRAVENOUS AS NEEDED
Status: DISCONTINUED | OUTPATIENT
Start: 2023-12-12 | End: 2023-12-12 | Stop reason: SURG

## 2023-12-12 RX ORDER — DIPHENHYDRAMINE HCL 25 MG
25 CAPSULE ORAL EVERY 4 HOURS PRN
Status: DISCONTINUED | OUTPATIENT
Start: 2023-12-12 | End: 2023-12-15

## 2023-12-12 RX ORDER — OXYCODONE HYDROCHLORIDE 10 MG/1
10 TABLET ORAL EVERY 4 HOURS PRN
Status: DISCONTINUED | OUTPATIENT
Start: 2023-12-12 | End: 2023-12-14

## 2023-12-12 RX ORDER — HYDROMORPHONE HYDROCHLORIDE 1 MG/ML
0.4 INJECTION, SOLUTION INTRAMUSCULAR; INTRAVENOUS; SUBCUTANEOUS EVERY 2 HOUR PRN
Status: DISCONTINUED | OUTPATIENT
Start: 2023-12-12 | End: 2023-12-15

## 2023-12-12 RX ORDER — NICOTINE POLACRILEX 4 MG
30 LOZENGE BUCCAL
Status: DISCONTINUED | OUTPATIENT
Start: 2023-12-12 | End: 2023-12-12 | Stop reason: HOSPADM

## 2023-12-12 RX ORDER — ONDANSETRON 2 MG/ML
4 INJECTION INTRAMUSCULAR; INTRAVENOUS EVERY 6 HOURS PRN
Status: DISCONTINUED | OUTPATIENT
Start: 2023-12-12 | End: 2023-12-15

## 2023-12-12 RX ORDER — LIDOCAINE HYDROCHLORIDE 10 MG/ML
INJECTION, SOLUTION EPIDURAL; INFILTRATION; INTRACAUDAL; PERINEURAL AS NEEDED
Status: DISCONTINUED | OUTPATIENT
Start: 2023-12-12 | End: 2023-12-12 | Stop reason: SURG

## 2023-12-12 RX ORDER — SODIUM CHLORIDE, SODIUM LACTATE, POTASSIUM CHLORIDE, CALCIUM CHLORIDE 600; 310; 30; 20 MG/100ML; MG/100ML; MG/100ML; MG/100ML
INJECTION, SOLUTION INTRAVENOUS CONTINUOUS
Status: DISCONTINUED | OUTPATIENT
Start: 2023-12-12 | End: 2023-12-12 | Stop reason: HOSPADM

## 2023-12-12 RX ORDER — FOLIC ACID 1 MG/1
1 TABLET ORAL DAILY
Status: DISCONTINUED | OUTPATIENT
Start: 2023-12-12 | End: 2023-12-15

## 2023-12-12 RX ORDER — HYDROCODONE BITARTRATE AND ACETAMINOPHEN 5; 325 MG/1; MG/1
2 TABLET ORAL ONCE AS NEEDED
Status: DISCONTINUED | OUTPATIENT
Start: 2023-12-12 | End: 2023-12-12 | Stop reason: HOSPADM

## 2023-12-12 RX ORDER — OXYCODONE HYDROCHLORIDE 5 MG/1
5 TABLET ORAL EVERY 4 HOURS PRN
Status: DISCONTINUED | OUTPATIENT
Start: 2023-12-12 | End: 2023-12-14

## 2023-12-12 RX ORDER — SODIUM CHLORIDE, SODIUM LACTATE, POTASSIUM CHLORIDE, CALCIUM CHLORIDE 600; 310; 30; 20 MG/100ML; MG/100ML; MG/100ML; MG/100ML
INJECTION, SOLUTION INTRAVENOUS CONTINUOUS
Status: DISCONTINUED | OUTPATIENT
Start: 2023-12-12 | End: 2023-12-15

## 2023-12-12 RX ORDER — TAMSULOSIN HYDROCHLORIDE 0.4 MG/1
0.4 CAPSULE ORAL
Status: DISCONTINUED | OUTPATIENT
Start: 2023-12-12 | End: 2023-12-15

## 2023-12-12 RX ORDER — ONDANSETRON 2 MG/ML
4 INJECTION INTRAMUSCULAR; INTRAVENOUS EVERY 6 HOURS PRN
Status: DISCONTINUED | OUTPATIENT
Start: 2023-12-12 | End: 2023-12-12 | Stop reason: HOSPADM

## 2023-12-12 RX ORDER — METHOCARBAMOL 750 MG/1
750 TABLET, FILM COATED ORAL 3 TIMES DAILY
Status: DISCONTINUED | OUTPATIENT
Start: 2023-12-12 | End: 2023-12-15

## 2023-12-12 RX ORDER — ACETAMINOPHEN 500 MG
1000 TABLET ORAL ONCE AS NEEDED
Status: DISCONTINUED | OUTPATIENT
Start: 2023-12-12 | End: 2023-12-12 | Stop reason: HOSPADM

## 2023-12-12 RX ORDER — TRANEXAMIC ACID 10 MG/ML
INJECTION, SOLUTION INTRAVENOUS AS NEEDED
Status: DISCONTINUED | OUTPATIENT
Start: 2023-12-12 | End: 2023-12-12 | Stop reason: SURG

## 2023-12-12 RX ADMIN — ONDANSETRON 4 MG: 2 INJECTION INTRAMUSCULAR; INTRAVENOUS at 14:47:00

## 2023-12-12 RX ADMIN — HYDRALAZINE HYDROCHLORIDE 5 MG: 20 INJECTION INTRAMUSCULAR; INTRAVENOUS at 11:24:00

## 2023-12-12 RX ADMIN — KETAMINE HYDROCHLORIDE 25 MG: 50 INJECTION, SOLUTION, CONCENTRATE INTRAMUSCULAR; INTRAVENOUS at 07:45:00

## 2023-12-12 RX ADMIN — SODIUM CHLORIDE, SODIUM LACTATE, POTASSIUM CHLORIDE, CALCIUM CHLORIDE: 600; 310; 30; 20 INJECTION, SOLUTION INTRAVENOUS at 07:40:00

## 2023-12-12 RX ADMIN — LABETALOL HYDROCHLORIDE 10 MG: 5 INJECTION, SOLUTION INTRAVENOUS at 11:21:00

## 2023-12-12 RX ADMIN — SODIUM CHLORIDE: 9 INJECTION, SOLUTION INTRAVENOUS at 07:49:00

## 2023-12-12 RX ADMIN — TRANEXAMIC ACID 2000 MG: 10 INJECTION, SOLUTION INTRAVENOUS at 09:10:00

## 2023-12-12 RX ADMIN — DEXAMETHASONE SODIUM PHOSPHATE 8 MG: 4 MG/ML VIAL (ML) INJECTION at 08:57:00

## 2023-12-12 RX ADMIN — KETAMINE HYDROCHLORIDE 25 MG: 50 INJECTION, SOLUTION, CONCENTRATE INTRAMUSCULAR; INTRAVENOUS at 11:29:00

## 2023-12-12 RX ADMIN — SODIUM CHLORIDE, SODIUM LACTATE, POTASSIUM CHLORIDE, CALCIUM CHLORIDE: 600; 310; 30; 20 INJECTION, SOLUTION INTRAVENOUS at 15:28:00

## 2023-12-12 RX ADMIN — HYDRALAZINE HYDROCHLORIDE 5 MG: 20 INJECTION INTRAMUSCULAR; INTRAVENOUS at 11:30:00

## 2023-12-12 RX ADMIN — CEFAZOLIN SODIUM/WATER 2 G: 2 G/20 ML SYRINGE (ML) INTRAVENOUS at 08:08:00

## 2023-12-12 RX ADMIN — LABETALOL HYDROCHLORIDE 10 MG: 5 INJECTION, SOLUTION INTRAVENOUS at 11:15:00

## 2023-12-12 RX ADMIN — PHENYLEPHRINE HCL 200 MCG: 10 MG/ML VIAL (ML) INJECTION at 14:54:00

## 2023-12-12 RX ADMIN — PHENYLEPHRINE HCL 50 MCG: 10 MG/ML VIAL (ML) INJECTION at 12:47:00

## 2023-12-12 RX ADMIN — CEFAZOLIN SODIUM/WATER 2 G: 2 G/20 ML SYRINGE (ML) INTRAVENOUS at 12:08:00

## 2023-12-12 RX ADMIN — METHOCARBAMOL 1 G: 100 INJECTION, SOLUTION INTRAMUSCULAR; INTRAVENOUS at 14:47:00

## 2023-12-12 RX ADMIN — LIDOCAINE HYDROCHLORIDE 50 MG: 10 INJECTION, SOLUTION EPIDURAL; INFILTRATION; INTRACAUDAL; PERINEURAL at 07:45:00

## 2023-12-12 RX ADMIN — MIDAZOLAM HYDROCHLORIDE 2 MG: 1 INJECTION INTRAMUSCULAR; INTRAVENOUS at 07:40:00

## 2023-12-12 RX ADMIN — SODIUM CHLORIDE: 9 INJECTION, SOLUTION INTRAVENOUS at 10:52:00

## 2023-12-12 RX ADMIN — ACETAMINOPHEN 1000 MG: 10 INJECTION, SOLUTION INTRAVENOUS at 14:52:00

## 2023-12-12 RX ADMIN — PHENYLEPHRINE HCL 100 MCG: 10 MG/ML VIAL (ML) INJECTION at 14:52:00

## 2023-12-12 NOTE — ANESTHESIA PROCEDURE NOTES
Peripheral IV  Date/Time: 12/12/2023 7:49 AM  Inserted by: Demetria Estrada MD    Placement  Needle size: 18 G  Laterality: right  Location: wrist  Local anesthetic: none  Site prep: alcohol  Technique: anatomical landmarks  Attempts: 1

## 2023-12-12 NOTE — ANESTHESIA POSTPROCEDURE EVALUATION
[address] Patient Status:  Inpatient   Age/Gender 58year old male MRN [id number]   [facility] SURGERY Attending Bibi Stokes MD   Hosp Day # 0 PCP Brijesh Leiva MD       Anesthesia Post-op Note    PRONE LATERAL INTERBODY FUSION LUMBAR 1 - LUMBAR 2, LUMBAR 2 - LUMBAR 3, LUMBAR 3 - LUMBAR 4 WITH DECOMPRESSION, POSTERIOR SPINAL FUSION THORACIC 10 - THORACIC 11, THORACIC 11 - THORACIC 12, THORACIC 12 - LUMBAR 1, LUMBAR 1 - LUMBAR 2, LUMBAR 2 - LUMBAR 3, LUMBAR 3 - LUMBAR 4, LUMBAR 4 - LUMBAR 5, LUMBAR 5 - PELVIC INSTRUMENTATION    Procedure Summary       Date: 12/12/23 Room / Location: [facility] MAIN OR  / [facility] MAIN OR    Anesthesia Start: 0740 Anesthesia Stop: 1619    Procedures:       PRONE LATERAL INTERBODY FUSION LUMBAR 1 - LUMBAR 2, LUMBAR 2 - LUMBAR 3, LUMBAR 3 - LUMBAR 4 WITH DECOMPRESSION, POSTERIOR SPINAL FUSION THORACIC 10 - THORACIC 11, THORACIC 11 - THORACIC 12, THORACIC 12 - LUMBAR 1, LUMBAR 1 - LUMBAR 2, LUMBAR 2 - LUMBAR 3, LUMBAR 3 - LUMBAR 4, LUMBAR 4 - LUMBAR 5, LUMBAR 5 - PELVIC INSTRUMENTATION (Spine Lumbar)      INTRAOPERATIVE NEURO MONITORING (Back) Diagnosis: (STENOSIS; SCOLOSIS; RIGHT QUAD WEAKNESS)    Surgeons: Bibi Stokes MD Anesthesiologist: Maru Stiles MD    Anesthesia Type: general ASA Status: 2            Anesthesia Type: general    Vitals Value Taken Time   /74 12/12/23 1620   Temp 97.1 12/12/23 1620   Pulse 80 12/12/23 1620   Resp 18 12/12/23 1620   SpO2 100 12/12/23 1620       Patient Location: PACU    Anesthesia Type: general    Airway Patency: patent and extubated    Postop Pain Control: adequate    Mental Status: preanesthetic baseline    Nausea/Vomiting: none    Cardiopulmonary/Hydration status: stable euvolemic    Complications: no apparent anesthesia related complications    Postop vital signs: stable    Dental Exam: Unchanged from Preop    Patient to be discharged from PACU when criteria met.

## 2023-12-12 NOTE — ANESTHESIA PROCEDURE NOTES
Airway  Date/Time: 12/12/2023 7:46 AM  Urgency: elective    Airway not difficult    General Information and Staff    Patient location during procedure: OR  Anesthesiologist: Gin Taylor MD  Resident/CRNA: Adria Borges CRNA  Performed: CRNA   Performed by: Adria Borges CRNA  Authorized by: Gin Taylor MD      Indications and Patient Condition  Indications for airway management: anesthesia  Spontaneous Ventilation: absent  Sedation level: deep  Preoxygenated: yes  Patient position: sniffing  Mask difficulty assessment: 1 - vent by mask    Final Airway Details  Final airway type: endotracheal airway      Successful airway: ETT  Cuffed: yes   Successful intubation technique: direct laryngoscopy  Facilitating devices/methods: intubating stylet  Endotracheal tube insertion site: oral  Blade: GlideScope  Blade size: #3  ETT size (mm): 7.5    Cormack-Lehane Classification: grade I - full view of glottis  Placement verified by: capnometry   Cuff volume (mL): 7  Measured from: lips  ETT to lips (cm): 22  Number of attempts at approach: 1  Number of other approaches attempted: 0    Additional Comments  Dentition per pre op

## 2023-12-12 NOTE — ANESTHESIA PROCEDURE NOTES
Peripheral IV  Date/Time: 12/12/2023 8:45 AM  Inserted by: Dave Guy CRNA    Placement  Needle size: 18 G  Laterality: left  Location: forearm  Local anesthetic: none  Site prep: alcohol  Technique: anatomical landmarks  Attempts: 2

## 2023-12-12 NOTE — ANESTHESIA PROCEDURE NOTES
Arterial Line    Performed by: Americo Lynn CRNA  Authorized by: Jessica Reynolds MD    General Information and Staff    Procedure Start:   Anesthesiologist:  Jessica Reynolds MD  CRNA:  Americo Lynn CRNA  Performed By:  Anesthesiologist  Patient Location:  OR  Indication: continuous blood pressure monitoring and blood sampling needed    Site Identification: surface landmarks    Preanesthetic Checklist: 2 patient identifiers, IV checked, risks and benefits discussed, monitors and equipment checked, pre-op evaluation, timeout performed, anesthesia consent and sterile technique used    Procedure Details    Catheter Size:  20 G  Catheter Length:  1 and 3/4 inch  Catheter Type:  Arrow  Seldinger Technique?: Yes    Laterality:  Left  Site:  Radial artery  Site Prep: chlorhexidine    Line Secured:  Wrist Brace, tape and Tegaderm    Assessment    Events: patient tolerated procedure well with no complications      Medications      Additional Comments

## 2023-12-13 LAB
BLOOD TYPE BARCODE: 5100
BLOOD TYPE BARCODE: 6200
GLUCOSE BLD-MCNC: 125 MG/DL (ref 70–99)
GLUCOSE BLD-MCNC: 164 MG/DL (ref 70–99)
GLUCOSE BLD-MCNC: 93 MG/DL (ref 70–99)
HCT VFR BLD AUTO: 30.2 %
HGB BLD-MCNC: 9.8 G/DL
UNIT VOLUME: 298 ML
UNIT VOLUME: 324 ML
UNIT VOLUME: 350 ML
UNIT VOLUME: 350 ML

## 2023-12-13 PROCEDURE — 99232 SBSQ HOSP IP/OBS MODERATE 35: CPT | Performed by: HOSPITALIST

## 2023-12-13 NOTE — PROGRESS NOTES
Patient admitted via bed from PACU. Oriented to room. Safety precautions initiated. Call light in reach. Spouse at bedside. Lethargic but follows commands. Oriented x 4. Reports severe lower back pain. Some chronic numbness to to BLE but reports no new radicular symptoms. Cerda in place. Warm blankets provided. Discussed plan of care with patient and spouse.

## 2023-12-13 NOTE — PLAN OF CARE
Alert and oriented x4. VSS. On 2L. . IS encouraged. Telemetry monitoring. SCDs and TEDs on BLE. Tolerating diet. Pain controlled with PRN medication. Gauze and tegaderm dressings to anterior and posterior back, C/D/I. LSO brace when OOB. Gel ice applied. Cerda catheter in place. PT/OT to see. Call light within reach.

## 2023-12-13 NOTE — PLAN OF CARE
Alert and oriented x 4. Vitals stable on room air. Pain managed on PO and IV medications. Dressings clean, dry, intact. Cerda catheter in place intact and draining. Last BM 12/12. Tolerating regular diet. SCD's on bilaterally. Safety precautions in place. PT/OT. Plan of care discussed with patient.

## 2023-12-13 NOTE — PHYSICAL THERAPY NOTE
PHYSICAL THERAPY EVALUATION - INPATIENT     Room Number: 368/368-A  Evaluation Date: 12/13/2023  Type of Evaluation: Initial  Physician Order: PT Eval and Treat    Presenting Problem: s/p LT10-Pelvis surgery with ALIF L4-S1 stage 1, then stage 2 surgery L1-L4 prone lateral with T10-pelvis PSF with instrumentation and decompression at L1-L4  Co-Morbidities : colon cancer, anemia, MS, bowel resection, R JANINE  Reason for Therapy: Mobility Dysfunction and Discharge Planning      ASSESSMENT   Pt is a 58year old male admitted on 12/12/2023 for the above surgery. Functional outcome measures completed include [facility]. The AM-PAC '6-Clicks' Inpatient Basic Mobility Short Form was completed and this patient is demonstrating a Approx Degree of Impairment: 20.91%  degree of impairment in mobility. Research supports that patients with this level of impairment may benefit from home. PT Discharge Recommendations: Home      PLAN  Patient has been evaluated and presents with no skilled Physical Therapy needs at this time. Patient discharged from Physical Therapy services. Please re-order if a new functional limitation presents during this admission. GOALS  Patient was able to achieve the following goals . .. Patient was able to transfer Safely with supervision   Patient able to ambulate on level surfaces Safely with supervision         HOME SITUATION  Type of Home: House   Home Layout: Two level; Able to live on main level  Stairs to Enter : 2     Stairs to Bedroom: 13  Railing: Yes    Lives With: Spouse  Drives: Yes  Patient Owned Equipment: Rolling walker;Cane  Patient Regularly Uses: Glasses    Prior Level of [place]: Pt is typically independent with ADLs, ambulates with no AD, and drives. Recently retired. Can sleep in a recliner. Pt was home in between stage 1 and stage 2 surgeries and was managing well at home.      SUBJECTIVE  Pt pleasant and cooperative during session      OBJECTIVE  Precautions: Spine;TLSO;Bed/chair alarm  Fall Risk: Standard fall risk    WEIGHT BEARING RESTRICTION  Weight Bearing Restriction: None                PAIN ASSESSMENT  Ratin  Location: back  Management Techniques: Activity promotion; Body mechanics; Relaxation;Repositioning    COGNITION  Overall Cognitive Status:  WFL - within functional limits    RANGE OF MOTION AND STRENGTH ASSESSMENT  Upper extremity ROM and strength are within functional limits     Lower extremity ROM is within functional limits     Lower extremity strength is within functional limits; mild generalized deconditioning      BALANCE  Static Sitting: Good  Dynamic Sitting: Good  Static Standing: Fair -  Dynamic Standing: Fair -    ADDITIONAL TESTS                                    ACTIVITY TOLERANCE                         O2 WALK       NEUROLOGICAL FINDINGS      Pt with numbness in B feet from MS. AM-PAC '6-Clicks' INPATIENT SHORT FORM - BASIC MOBILITY  How much difficulty does the patient currently have. .. Patient Difficulty: Turning over in bed (including adjusting bedclothes, sheets and blankets)?: None   Patient Difficulty: Sitting down on and standing up from a chair with arms (e.g., wheelchair, bedside commode, etc.): None   Patient Difficulty: Moving from lying on back to sitting on the side of the bed?: None   How much help from another person does the patient currently need. ..    Help from Another: Moving to and from a bed to a chair (including a wheelchair)?: None   Help from Another: Need to walk in hospital room?: A Little   Help from Another: Climbing 3-5 steps with a railing?: A Little       AM-PAC Score:  Raw Score: 22   Approx Degree of Impairment: 20.91%   Standardized Score (AM-PAC Scale): 53.28   CMS Modifier (G-Code): CJ    FUNCTIONAL ABILITY STATUS  Gait Assessment   Functional Mobility/Gait Assessment  Gait Assistance: Supervision  Distance (ft): 150  Assistive Device: Rolling walker  Pattern: Within Functional Limits (slow isak)  Stairs: Stairs;Stoop/curb; Car transfer  How Many Stairs: 4  Device: 2 Rails  Assist: Supervision  Pattern: Ascend and Descend  Ascend and Descend : Step to  Stoop/Curb: Pt ascended and descended 1 stoop step with RW and supervision  Car transfer: Pt educated on car transfer and verbalized understanding    Skilled Therapy Provided: Per RN okay to work with pt. Pt received in supine and was agreeable to PT session. Bed Mobility:  Rolling: mod ind via log roll   Supine to sit: mod ind via log roll    Sit to supine: NT     Transfer Mobility:  Sit to stand: mod ind    Stand to sit: mod ind   Gait = pt ambulated with RW and supervision    Pt stair climbed as above    Therapist's comments:Pt educated on role of therapy, goals for session, safety, fall prevention, spine precautions, log roll, brace, activity recommendations, and discharge planning. Exercise/Education Provided:  Bed mobility  Body mechanics  Energy conservation  Functional activity tolerated  Gait training  Posture  Strengthening  Transfer training    Patient End of Session: With [facility] staff;Call light within reach;RN aware of session/findings; All patient questions and concerns addressed; Discussed recommendations with / (pt left in bathroom with OT)    Patient Evaluation Complexity Level:  History Moderate - 1 or 2 personal factors and/or co-morbidities   Examination of body systems Low - addressing 1-2 elements   Clinical Presentation Low - Stable   Clinical Decision Making Low Complexity       PT Session Time: 25 minutes  Gait Training: 10 minutes

## 2023-12-13 NOTE — PROGRESS NOTES
12/12/23 3421   BiPAP/CPAP Monitored Parameters   Toleration Refused  (patient would like to wear tomorrow night, will not wear cpap today)     Betsy Pollock  RT

## 2023-12-14 ENCOUNTER — APPOINTMENT (OUTPATIENT)
Dept: GENERAL RADIOLOGY | Facility: HOSPITAL | Age: 63
End: 2023-12-14
Attending: PHYSICIAN ASSISTANT
Payer: COMMERCIAL

## 2023-12-14 PROCEDURE — 99232 SBSQ HOSP IP/OBS MODERATE 35: CPT | Performed by: HOSPITALIST

## 2023-12-14 PROCEDURE — 5A09357 ASSISTANCE WITH RESPIRATORY VENTILATION, LESS THAN 24 CONSECUTIVE HOURS, CONTINUOUS POSITIVE AIRWAY PRESSURE: ICD-10-PCS | Performed by: INTERNAL MEDICINE

## 2023-12-14 PROCEDURE — 72082 X-RAY EXAM ENTIRE SPI 2/3 VW: CPT | Performed by: PHYSICIAN ASSISTANT

## 2023-12-14 PROCEDURE — 72100 X-RAY EXAM L-S SPINE 2/3 VWS: CPT | Performed by: PHYSICIAN ASSISTANT

## 2023-12-14 RX ORDER — OXYCODONE AND ACETAMINOPHEN 10; 325 MG/1; MG/1
2 TABLET ORAL EVERY 4 HOURS PRN
Status: DISCONTINUED | OUTPATIENT
Start: 2023-12-14 | End: 2023-12-15

## 2023-12-14 RX ORDER — OXYCODONE AND ACETAMINOPHEN 10; 325 MG/1; MG/1
1 TABLET ORAL EVERY 4 HOURS PRN
Status: DISCONTINUED | OUTPATIENT
Start: 2023-12-14 | End: 2023-12-15

## 2023-12-14 NOTE — PLAN OF CARE
Alert and oriented x4. VSS. On 2L. . IS encouraged. Telemetry monitoring. SCDs and TEDs on BLE. Tolerating diet. Pain controlled with PRN medication. Gauze and tegaderm dressings to anterior and posterior back, C/D/I. LSO brace when OOB. Up with min assist. Gel ice applied. Voiding freely. PT/OT. Call light within reach.

## 2023-12-14 NOTE — PLAN OF CARE
Patient alert and oriented x4. VSS on RA. Pain controlled with PO PRN pain meds. Denies n/t. TEDs and SCDs in place, ankle pumps encouraged, IS encouraged. LSO brace when OOB. Coverlet dressings to back, CDI. Pt up x1 with the walker. Tolerating diet, no c/o n/v. Voiding freely in the bathroom.      Plan: xrays, pain control, discharge when cleared by all services     1310: Spoke to [facility], plan for discharge tomorrow 12/14

## 2023-12-14 NOTE — OPERATIVE REPORT
Operative Report  Patient Name:  Marcellus Sanon  Date: 12/12/2023  Preoperative Diagnosis: Lumbar Radiculopathy, Degenerative Disc Disease, Spondylolisthesis, Degenerative scoliosis, coronal and sagittal imbalance  Postoperative Diagnosis: Same  Primary Surgeon: Ksenia Esteves MD  First Assistant for Posterior instrumentation and fusion of T10-S1: Sendy Box MD - assisted with Posterior instrumentation and fusion of T10-S1  Second Assist: Ayaan DC - First assisted with Prone lateral interbody fusion L1-L4 and pelvic fixation and smith reyna osteotomy at L1-2  Procedures:   - L1-2, L2-3, L3-4 prone Lateral interbody fusion       CPT 15057, 22585 x 2  - Placement of Interbody spacer x 3      CPT 22853 x 3  - Pedicle screw instrumentation T10-S1     CPT 13105  - Posterior Spinal Fusion T10-S1            CPT 68171  - Anterior Instrumentation L1-2      CPT 02749  - Pelvic Fixation         CPT 84409  - Jorge Cristine reyna osteotomy L1-2       CPT 31449   - Placement of Vibone allograft      CPT 51280   - Placement of Autograft        CPT 27948  - Supervision of Flouroscopy  - Use of neuromonitoring    Anesthesia: General Endotracheal Anesthesia  Estimated Blood Loss: 250 cc  Implants: Atec IdentiTi lateral L1-2 [id number] 0 deg, L2-3 [id number] 10 deg, L3-4 [id number] 10 deg, AMP plate and 35 mm screws, T10 - 5.5 x 40, 6.5 x 40 Left T11/12/L1 right T11/L1, 6.5 35 R T12, 6.5 x 45 L2, 7.5 x 45 L4/5, 7.5 40 S1, 9.0 x 80mm Pelvic screws, 5.5 x 250mm rods x 2   Bone Graft: DBM allograft autograft   Specimen: None  Condition: Stable  Complications: None      Surgical Indications:  Marcellus Sanon is an 58year old male who failed conservative treatment for the  issues listed above. The option of surgery was discussed with the patient.   Risks, benefits, and alternatives of surgery were discussed with the patient, which include but are not limited to bleeding, infection, DVT/PE, dural tears, neurologic injury, worsening of neurological status, risk of instability, need for subsequent surgery, risks associated with anesthesia, including death, which were all reviewed with the patient and she consented to proceed with surgery. Surgical Procedure:   After careful identification the patient patient was brought to the operating room laid supine. Patient was induced under general anesthesia endotracheal tube was placed. Patient was flipped prone onto the Loy frame. Hips and knees extended. All bony prominences were carefully padded, the back and flank was sterilely prepped and draped in standard fashion. Skin markings were made on the lateral and AP fluoroscopic image for the lateral incision and the posteriorly for the disk spaces. A 2 inch skin incision was made on the right. Electrocautery was used to incise through the subcutaneous fat. Obliques were dissected using blunt finger dissection. Transversalis fascia was penetrated using a tissue dilator and finger dissection was used to sweep the retroperitoneal fat and feel the psoas and transverse process of the segmental level. Under the aid of free running EMG and radiographic visualization the tissue dilator was placed through the psoas targeted at the posterior 1/3 of the intervertebral disc of L3-4. Theta Skains was placed and checked on fluoro image. Sequential dilators were placed followed by the final retractor. Combination of intradiscal jorge placement and retractor maneuvering was utilized to obtain a clear corridor for discectomy. Complete discectomy was conducted using a combination of [place] elevators, curettes and pituitary rongeurs. Trial sizers were placed and appropriate interbody fusion device selected. This was filled with graft material and tamped into position under fluroscopically guided final position. Area was inspected for bleeding and slow removal of retractor was used to inspect the area.      The same series of steps were taken at L2-3 and L1-2. Next, attention was turned to the anterior instrumentation,  An appropriate sized separate plate that is not integral to the cage was selected and applied. Screw were placed only through the plate and not through the cages, and a radiograph obtained to confirm position of the graft and evaluate plate fixation    Lateral incision was closed using #1 vicryl for the deep subcutaneous tissue, 2-0 vicryl for subQ tissue and 3-0 monocril for the skin. Skin was dressed with dermabond, 4x4 and ioband. Attention was redirected toward posterior pedicle screws. Next, attention was turned to the posterior percutaneous pedicle screw instrumentation and posterior spinal fusion. AP and lateral flouroscopic images were taken throughout the procedure to aid in instrumentation placement. Perfect APs werer used to татьяна out the lateral border of the pedicles bilaterally at the proposed treated levels. Skin incision was made 1cm lateral to this marks. Electocautery was used to dissect through the subcutaneous tissue and the fascia. Blunt finger dissection was used to develop the plane between the multifidus and remaining paraspinal muscles until the facet joint, pars and transverse process was palpated. Pedicle screws were placed from T10- S1 using standard fluoroscopically guided pedicle screw technique,  placed the left sided screws, HANANE placed the right, L3 screws were not placed bilaterally. Jamshidis' were placed at the lateral border of the pedicle and inserted to a depth of 25-30mm using AP flouro imaging. Kerry Brambila were then inserted. This was completed for all the remaining pedicles bilaterally. Lateral flouro image was taken to ensure appropriate wire trajectory. Pedicles screws of appropriate diameter and length (as assessed using pre operative imaging) were then placed at each level and positioned confirmed with AP/Lat flouro images and EMG probing of screw heads. All screws simulated >12mAMPs. A garcia elevator was inserted over the joint and bovie was used to remove the facet capsule proximal to the screw head. The facet joint was burred down and bone graft in the area was maintained to promote posterior facet joint fusion from T10-S1. Next, attention turned to Pelvic S2A1 screws - placed with fluoroscopic guidance using 30/30 and freehand technique. First Awl, then k wire, followed by tap and then screw. Position was confirmed on AP/Lateral/intlet/outlet and teardrop view. Next, attention was turned to the [facility] osteotomy at L1-2 - pedicle screw based retractor was placed bilaterally at L1-2, soft tissue denuded off the lamina and facet, osteotome was used to resect and excise the bilateral L1 IAP and L2 SAP, followed by high speed salima use to complete the central laminectomy this completed the aldrich reyna osteotomy. 2 rods of appropriate length were selected and contoured to shape and reduced to the tulip heads. The caps were then tightened to the screw 's predetermined specification utilizing a torque . The construct was the evaluated fluoroscopically and determined to be appropriate. 2 grams of vancomycin powder was placed in the wound. Closure was done in layers with interrupted 0 Vicryl for the fascia, 2-0 Vicryl for the subcutaneous tissue. The subcutaneous layer was injected with 0.375% Marcaine and the skin was closed with staples. Xeroform and a sterile dressing were then applied. The patient was woken from anesthesia and transferred to the PACU in stable condition.                  Two assistants were necessary given the complexity of the case to provide positioning, exposure, hemostasis, safety and retraction and to manage wound suction so that I could operate with two hands    Tono Bautista MD   Minimally Invasive and Complex Spine Surgery [facility]

## 2023-12-15 VITALS
BODY MASS INDEX: 23.09 KG/M2 | SYSTOLIC BLOOD PRESSURE: 135 MMHG | HEIGHT: 72 IN | OXYGEN SATURATION: 96 % | TEMPERATURE: 99 F | HEART RATE: 91 BPM | RESPIRATION RATE: 18 BRPM | WEIGHT: 170.5 LBS | DIASTOLIC BLOOD PRESSURE: 81 MMHG

## 2023-12-15 PROCEDURE — 99232 SBSQ HOSP IP/OBS MODERATE 35: CPT | Performed by: HOSPITALIST

## 2023-12-15 RX ORDER — PSEUDOEPHEDRINE HCL 30 MG
100 TABLET ORAL 2 TIMES DAILY
Qty: 30 CAPSULE | Refills: 0 | Status: SHIPPED | OUTPATIENT
Start: 2023-12-15

## 2023-12-15 NOTE — PROGRESS NOTES
NURSING DISCHARGE NOTE    Discharged Home via Wheelchair. Accompanied by Support staff  Belongings Taken by patient/family. IV's removed. Discharge instructions reviewed. Patient stated understanding, all questions answered. Pt taken down via wheelchair to [facility] garage for discharge home with family. Home med returned from IP bin and pt discharged home with back brace in place.

## 2023-12-15 NOTE — PLAN OF CARE
Patient A&O X4 on RA- hx CAROLINA w/ CPAP. VSS, /IS/telemetry- NSR. SCDs/TEDs. Voiding freely via bathroom, LBM 12/12. Surgical incision to lower back/abdomen covered with coverlet, c/d/i. Denies n/t. Pain controlled with PO medication. PT/OT. Up min assist w/ walker. LSO brace when OOB. Reminded to use call light. Plan to dc home 12/15.

## 2023-12-15 NOTE — PLAN OF CARE
A&Ox4. VSS. On room air. . IS encouraged. Hx CAROLINA w/ CPAP - telemetry monitoring. Teds and SCDs. Ankle pumps encouraged. Tolerating diet. Last BM 12/12 - received Milk of Mag this morning. Constipation education provided. Voiding. Pain managed with PO medication. Surgical dressings C/D/I. Ambulating with min assist with walker and gait belt, LSO worn when OOB. Plan is to dc home today. Patient updated and in agreement with plan of care. Safety precautions in place. Instructed patient to call for assistance, call light within reach.

## 2023-12-18 ENCOUNTER — PATIENT OUTREACH (OUTPATIENT)
Dept: CASE MANAGEMENT | Age: 63
End: 2023-12-18

## 2023-12-22 DIAGNOSIS — E78.5 DYSLIPIDEMIA: ICD-10-CM

## 2023-12-24 RX ORDER — PRAVASTATIN SODIUM 80 MG/1
80 TABLET ORAL DAILY
Qty: 90 TABLET | Refills: 3 | Status: SHIPPED | OUTPATIENT
Start: 2023-12-24

## 2023-12-24 RX ORDER — FENOFIBRATE 145 MG/1
145 TABLET, COATED ORAL DAILY
Qty: 90 TABLET | Refills: 3 | Status: SHIPPED | OUTPATIENT
Start: 2023-12-24

## 2024-01-04 RX ORDER — LOSARTAN POTASSIUM 100 MG/1
100 TABLET ORAL DAILY
Qty: 90 TABLET | Refills: 3 | Status: SHIPPED | OUTPATIENT
Start: 2024-01-04

## 2024-01-04 NOTE — TELEPHONE ENCOUNTER
Last OV? 11/27/2023    Last CPE? 03/16/2023    Last Refill?   Documenting Provider Encounter Provider   External/Patient, Reported (none)     Medication Detail    Medication Quantity Refills Start End   losartan 100 MG Oral Tab -- --  --   Sig:   Take 1 tablet (100 mg total) by mouth at bedtime.       Last Labs?  CBC- 12/12/2023  CMP- 11/20/2023  Future Appointments   Date Time Provider Department Center   1/18/2024 10:40 AM Michael Borden MD [facility]   2/16/2024  8:00 AM Tavo Lynch MD [facility]   2/26/2024 10:00 AM Eric Rios DPM [id number]       Per Protocol?   Met    Please Approve or Deny

## 2024-01-08 RX ORDER — AMLODIPINE BESYLATE 10 MG/1
10 TABLET ORAL DAILY
Qty: 90 TABLET | Refills: 0 | Status: SHIPPED | OUTPATIENT
Start: 2024-01-08

## 2024-01-08 NOTE — TELEPHONE ENCOUNTER
Last OV? 11/27/2023    Last CPE? 03/16/2023    Last Refill?  Medication Quantity Refills Start End   AMLODIPINE 10 MG Oral Tab 90 tablet 0 10/16/2023 --   Sig:   TAKE ONE TABLET BY MOUTH ONCE DAILY       Last Labs?  CBC- 12/12/2023  CMP-11/20/2023    Future Appointments   Date Time Provider Department Center   1/18/2024 10:40 AM Michael Borden MD [facility]   2/16/2024  8:00 AM Tavo Lynch MD [facility]   2/26/2024 10:00 AM Eric Rios DPM [id number]     Per Protocol?   Met- Refill sent from 90 days

## 2024-01-18 ENCOUNTER — OFFICE VISIT (OUTPATIENT)
Dept: INTERNAL MEDICINE CLINIC | Facility: CLINIC | Age: 64
End: 2024-01-18
Payer: COMMERCIAL

## 2024-01-18 VITALS
HEART RATE: 91 BPM | DIASTOLIC BLOOD PRESSURE: 72 MMHG | WEIGHT: 173.19 LBS | TEMPERATURE: 98 F | BODY MASS INDEX: 23 KG/M2 | SYSTOLIC BLOOD PRESSURE: 118 MMHG | RESPIRATION RATE: 20 BRPM | OXYGEN SATURATION: 99 %

## 2024-01-18 DIAGNOSIS — M48.061 LUMBAR FORAMINAL STENOSIS: Primary | ICD-10-CM

## 2024-01-18 DIAGNOSIS — M51.26 HNP (HERNIATED NUCLEUS PULPOSUS), LUMBAR: ICD-10-CM

## 2024-01-18 DIAGNOSIS — M51.16 DISPLACEMENT OF LUMBAR INTERVERTEBRAL DISC WITH RADICULOPATHY: ICD-10-CM

## 2024-01-18 PROCEDURE — 3078F DIAST BP <80 MM HG: CPT | Performed by: INTERNAL MEDICINE

## 2024-01-18 PROCEDURE — 99214 OFFICE O/P EST MOD 30 MIN: CPT | Performed by: INTERNAL MEDICINE

## 2024-01-18 PROCEDURE — 3074F SYST BP LT 130 MM HG: CPT | Performed by: INTERNAL MEDICINE

## 2024-01-18 NOTE — PROGRESS NOTES
Austyn Wynne  5/24/1960    Chief Complaint   Patient presents with    Post-Op     Rm 7        SUBJECTIVE   Austyn Wynne is a 63 year old male who presents as a follow-up.    Following spinal fusion on 12/7 and 12/12 the patient reports significant improvement in pain of the lower extremities, with some residual numbness of the right proximal anterior lower extremity, without weakness. He has been ambulating independently within the home and with the use of a rollator outside of the home. No dysuria, diarrhea, cough or shortness of breath, chest or calf pain, fevers, or chills. No incisional drainage/discharged reported. No acute concerns at this time.    Review of Systems   No f/c/chest pain or sob. No cough. No abd pain/n/v/d. No ha or dizziness. No weakness. No other complaints today.    Current Outpatient Medications   Medication Sig Dispense Refill    AMLODIPINE 10 MG Oral Tab TAKE ONE TABLET BY MOUTH ONCE DAILY 90 tablet 0    losartan 100 MG Oral Tab Take 1 tablet (100 mg total) by mouth daily. 90 tablet 3    FENOFIBRATE 145 MG Oral Tab TAKE 1 TABLET BY MOUTH DAILY 90 tablet 3    PRAVASTATIN SODIUM 80 MG Oral Tab TAKE 1 TABLET BY MOUTH DAILY 90 tablet 3    docusate sodium 100 MG Oral Cap Take 100 mg by mouth 2 (two) times daily. 30 capsule 0    diphenhydrAMINE HCl (BENADRYL ALLERGY OR) Take 1 tablet by mouth one time. For Hibiclens.      tamsulosin 0.4 MG Oral Cap Take 1 capsule (0.4 mg total) by mouth daily.      omega-3 fatty acids 1000 MG Oral Cap Take 1,000 mg by mouth As Directed.      Acidophilus/Pectin Oral Cap Take 1 capsule by mouth daily as needed.      Calcium Carbonate Antacid (TUMS OR) Take 1 tablet by mouth daily as needed.      Omeprazole 40 MG Oral Capsule Delayed Release Take 1 capsule (40 mg total) by mouth daily. 30 capsule 0    folic acid 1 MG Oral Tab Take 1 tablet (1 mg total) by mouth daily.      Psyllium (METAMUCIL FIBER OR) Take 1 tablet by mouth daily.      Cholecalciferol  (VITAMIN D) 2000 UNITS Oral Cap Take 1 capsule (2,000 Units total) by mouth daily.      Cyanocobalamin (VITAMIN B 12 OR) Take 1 tablet by mouth daily.        Teriflunomide 14 MG Oral Tab Take 14 mg by mouth daily. AUBAGIO       methocarbamol 750 MG Oral Tab Take 1 tablet (750 mg total) by mouth 3 (three) times daily. (Patient not taking: Reported on 1/18/2024)      oxyCODONE-acetaminophen 5-325 MG Oral Tab Take 1-2 tablets by mouth every 4 to 6 hours. (Patient not taking: Reported on 1/18/2024)      traMADol 50 MG Oral Tab Take 1 tablet (50 mg total) by mouth every 8 (eight) hours as needed for Pain. (Patient not taking: Reported on 1/18/2024) 21 tablet 0      No Known Allergies   Past Medical History:   Diagnosis Date    Calculus of kidney 1980    Cancer (HCC) 2011    colon    Carcinoid tumor     cecal carcinoid    Chest pain     Colon polyps     Diverticulitis     left colon    Diverticulosis     Esophageal reflux 02/2016    Fe deficiency anemia     Frequent urination 2016    Believed to be side affect of MS    High blood pressure     High cholesterol     Multiple sclerosis (HCC) 06/2015    Nephrolithiasis     Pain in joints 2018    Hip pain...total hip replacement 6-2020    Pneumonia 2006    Prediabetes     Diet control    Sleep apnea 2000    Testicular lump     right    Visual impairment     glasses    Wears glasses 1976    Glasses      Patient Active Problem List   Diagnosis    Diverticulitis    Colon polyps    CAROLINA on CPAP    Nephrolithiasis    Benign essential HTN    Fe deficiency anemia    MS (multiple sclerosis) (HCC)    B12 deficiency    Dyspnea, unspecified type    Chest pain with moderate risk for cardiac etiology    Carcinoid tumor of cecum    Hyperlipidemia    Acute blood loss as cause of postoperative anemia    Incisional hernia    Anemia    Neutropenia (HCC)    Primary osteoarthritis of right hip    Personal history of colonic polyps    Diverticulosis    Internal hemorrhoids    Chronic right-sided low  back pain with right-sided sciatica    Lumbar foraminal stenosis    Displacement of lumbar intervertebral disc with radiculopathy    Neurogenic bladder    Cholangitis due to bile duct calculus with obstruction    Transaminitis    Chronic anemia    Primary hypertension    Pre-op testing    S/P lumbar fusion    Spinal stenosis, lumbar      Past Surgical History:   Procedure Laterality Date    APPENDECTOMY  02/2011    Removed in Colon cancer surgury    BOWEL RESECTION      CHOLECYSTECTOMY      COLECTOMY  2011 & 2019    To remove colon cancer    COLONOSCOPY  01/31/2012    COLONOSCOPY  01/2015    since colon cancer have 1 every 3 years    ERCP,DIAGNOSTIC      FRACTURE SURGERY      Fracture of right 4th finger repaired - no metal    HERNIA SURGERY  05/09/2019    Abd.    HIP REPLACEMENT SURGERY      right hip replacement    LAPAROSCOPIC CHOLECYSTECTOMY  05/06/2023    Robotic cholecystectomy    LAPAROSCOPY, SURGICAL; COLECTOMY, PARTIAL, W/ ANASTOMOS  02/2011    OTHER SURGICAL HISTORY  02/05/2019    Laparoscopic-assisted small bowel resection with anastomosis    OTHER SURGICAL HISTORY  03/08/2019    Repair of incarcerated incisional hernia    TONSILLECTOMY      VASECTOMY  1990      Social History     Socioeconomic History    Marital status:    Tobacco Use    Smoking status: Never    Smokeless tobacco: Never   Vaping Use    Vaping Use: Never used   Substance and Sexual Activity    Alcohol use: Yes     Alcohol/week: 2.0 - 3.0 standard drinks of alcohol     Types: 2 - 3 Cans of beer per week     Comment: 2 to 3 beers a week    Drug use: No   Other Topics Concern    Caffeine Concern Yes    Stress Concern No    Weight Concern Yes     Comment: about 15 to 20 pounds over weight    Special Diet No    Exercise Yes    Seat Belt Yes     Social Determinants of Health     Financial Resource Strain: Low Risk  (5/8/2023)    Financial Resource Strain     Difficulty of Paying Living Expenses: Not very hard     Med Affordability: No    Food Insecurity: No Food Insecurity (12/12/2023)    Food Insecurity     Food Insecurity: Never true   Transportation Needs: No Transportation Needs (12/12/2023)    Transportation Needs     Lack of Transportation: No   Housing Stability: Low Risk  (12/12/2023)    Housing Stability     Housing Instability: No         OBJECTIVE:   /72 (BP Location: Left arm, Patient Position: Sitting, Cuff Size: adult)   Pulse 91   Temp 98.1 °F (36.7 °C) (Skin)   Resp 20   Wt 173 lb 3.2 oz (78.6 kg)   SpO2 99%   BMI 23.49 kg/m²   Constitutional: Oriented to person, place, and time. No distress.   HEENT:  Normocephalic and atraumatic.TM's wnl.  Nose normal. Oropharynx is clear and moist.   Eyes: Conjunctivae wnl.   Neck: Normal range of motion. Neck supple. Normal carotid pulses. No masses.  Cardiovascular: Normal rate, regular rhythm and intact distal pulses.  No murmur, rubs or gallops. Monet's sign negative bilaterally.  Pulmonary/Chest: Effort normal and breath sounds normal. No respiratory distress.  Abdominal: Soft. Bowel sounds are normal. Non tender, no masses, no organomegaly or hernias.  Musculoskeletal: No edema  Skin: Skin is warm and dry. No rash.  Psychiatric: Normal mood and affect.     ASSESSMENT AND PLAN:   Austyn Wynne is a 63 year old male who presents as a follow-up.    Outstanding screening and preventive measures:  Shingles, tetanus, and influenza immunizations: deferred to follow-up    Lumbar foraminal stenosis, lumbar HNP, and DDD of lumbar spine:  s/p Stage I: 12/7/2023. ALIF L4-S1  Stage II: 12/12/2023. X left L1-4, PSF   Unremarkable post-operative course and without sign of infection  Pain controlled without medical management  Continue follow-up with spine service  No changes in management at this time    The patient indicates understanding of these issues and agrees to the plan.  TODAY'S ORDERS     No orders of the defined types were placed in this encounter.      Meds &  Refills:  Requested Prescriptions      No prescriptions requested or ordered in this encounter       Imaging & Consults:  None    No follow-ups on file.  There are no Patient Instructions on file for this visit.    All questions were answered and the patient agrees with the plan.     Thank you,  Michael Borden MD

## 2024-01-26 LAB
AMB EXT BILIRUBIN, TOTAL: 0.5 MG/DL
AMB EXT BUN: 14 MG/DL
AMB EXT CALCIUM: 9.7
AMB EXT CARBON DIOXIDE: 24
AMB EXT CHLORIDE: 105
AMB EXT CHLORIDE: 105
AMB EXT CHOL/HDL RATIO: 8.9
AMB EXT CHOLESTEROL, TOTAL: 160 MG/DL
AMB EXT CMP ALT: 11 U/L
AMB EXT CMP AST: 27 U/L
AMB EXT CREATININE: 1.17 MG/DL
AMB EXT GLUCOSE: 92 MG/DL
AMB EXT GLUCOSE: 92 MG/DL
AMB EXT HDL CHOLESTEROL: 18 MG/DL
AMB EXT LDL CHOLESTEROL, DIRECT: 101 MG/DL
AMB EXT POSTASSIUM: 4.2 MMOL/L
AMB EXT POSTASSIUM: 4.2 MMOL/L
AMB EXT SODIUM: 143 MMOL/L
AMB EXT SODIUM: 143 MMOL/L
AMB EXT TOTAL PROTEIN: 6.4
AMB EXT TRIGLYCERIDES: 233 MG/DL

## 2024-02-01 ENCOUNTER — TELEPHONE (OUTPATIENT)
Dept: INTERNAL MEDICINE CLINIC | Facility: CLINIC | Age: 64
End: 2024-02-01

## 2024-02-01 NOTE — TELEPHONE ENCOUNTER
Lab results received from [facility]. GGT 73, Tg 233, HDL 18, chol/HDL ratio 8.9. Placed in AD bin for review.

## 2024-02-26 ENCOUNTER — OFFICE VISIT (OUTPATIENT)
Dept: PODIATRY CLINIC | Facility: CLINIC | Age: 64
End: 2024-02-26

## 2024-02-26 DIAGNOSIS — M79.675 PAIN OF TOE OF LEFT FOOT: Primary | ICD-10-CM

## 2024-02-26 DIAGNOSIS — B35.1 ONYCHOMYCOSIS: ICD-10-CM

## 2024-02-26 DIAGNOSIS — B35.1 DERMATOPHYTOSIS OF NAIL: ICD-10-CM

## 2024-02-26 DIAGNOSIS — L60.3 ONYCHODYSTROPHY: ICD-10-CM

## 2024-02-26 DIAGNOSIS — M20.42 HAMMER TOE OF LEFT FOOT: ICD-10-CM

## 2024-02-26 DIAGNOSIS — L84 HELOMA DURUM: ICD-10-CM

## 2024-02-26 PROCEDURE — 99213 OFFICE O/P EST LOW 20 MIN: CPT | Performed by: PODIATRIST

## 2024-02-26 NOTE — PROGRESS NOTES
Austyn Wynne is a 63 year old male.   Chief Complaint   Patient presents with    Follow - Up     Left 2nd digit follow up- denies any pain.    Toenail Care     Nail care and foot check.         HPI:   Patient returns to the clinic for follow-up on his left foot second toe and for nail care.  In January has had 2 back surgeries which had a good result so far he is wearing a back brace but he is just ambulating.  No pain with ambulation.  Does however complain of a thickened second toenail with a corn on the tip.  At today's visit reviewed nurse's history as taken above, allergies medications and medical history as documented below.  All changes duly noted  Allergies: Patient has no known allergies.   Current Outpatient Medications   Medication Sig Dispense Refill    fluorouracil 5 % External Cream Apply 1 Application topically every evening for 28 days. Apply to the scalp daily x 4 weeks 40 g 1    AMLODIPINE 10 MG Oral Tab TAKE ONE TABLET BY MOUTH ONCE DAILY 90 tablet 0    losartan 100 MG Oral Tab Take 1 tablet (100 mg total) by mouth daily. 90 tablet 3    FENOFIBRATE 145 MG Oral Tab TAKE 1 TABLET BY MOUTH DAILY 90 tablet 3    PRAVASTATIN SODIUM 80 MG Oral Tab TAKE 1 TABLET BY MOUTH DAILY 90 tablet 3    docusate sodium 100 MG Oral Cap Take 100 mg by mouth 2 (two) times daily. 30 capsule 0    diphenhydrAMINE HCl (BENADRYL ALLERGY OR) Take 1 tablet by mouth one time. For Hibiclens.      methocarbamol 750 MG Oral Tab Take 1 tablet (750 mg total) by mouth 3 (three) times daily.      oxyCODONE-acetaminophen 5-325 MG Oral Tab Take 1-2 tablets by mouth every 4 to 6 hours.      tamsulosin 0.4 MG Oral Cap Take 1 capsule (0.4 mg total) by mouth daily.      traMADol 50 MG Oral Tab Take 1 tablet (50 mg total) by mouth every 8 (eight) hours as needed for Pain. 21 tablet 0    omega-3 fatty acids 1000 MG Oral Cap Take 1,000 mg by mouth As Directed.      Acidophilus/Pectin Oral Cap Take 1 capsule by mouth daily as needed.       Calcium Carbonate Antacid (TUMS OR) Take 1 tablet by mouth daily as needed.      Omeprazole 40 MG Oral Capsule Delayed Release Take 1 capsule (40 mg total) by mouth daily. 30 capsule 0    folic acid 1 MG Oral Tab Take 1 tablet (1 mg total) by mouth daily.      Psyllium (METAMUCIL FIBER OR) Take 1 tablet by mouth daily.      Cholecalciferol (VITAMIN D) 2000 UNITS Oral Cap Take 1 capsule (2,000 Units total) by mouth daily.      Cyanocobalamin (VITAMIN B 12 OR) Take 1 tablet by mouth daily.        Teriflunomide 14 MG Oral Tab Take 14 mg by mouth daily. AUBAGIO         Past Medical History:   Diagnosis Date    Calculus of kidney 1980    Cancer (HCC) 2011    colon    Carcinoid tumor (HCC)     cecal carcinoid    Chest pain     Colon polyps     Diverticulitis     left colon    Diverticulosis     Esophageal reflux 02/2016    Fe deficiency anemia     Frequent urination 2016    Believed to be side affect of MS    High blood pressure     High cholesterol     Multiple sclerosis (HCC) 06/2015    Nephrolithiasis     Pain in joints 2018    Hip pain...total hip replacement 6-2020    Pneumonia 2006    Prediabetes     Diet control    Sleep apnea 2000    Testicular lump     right    Visual impairment     glasses    Wears glasses 1976    Glasses      Past Surgical History:   Procedure Laterality Date    APPENDECTOMY  02/2011    Removed in Colon cancer surgury    BOWEL RESECTION      CHOLECYSTECTOMY      COLECTOMY  2011 & 2019    To remove colon cancer    COLONOSCOPY  01/31/2012    COLONOSCOPY  01/2015    since colon cancer have 1 every 3 years    ERCP,DIAGNOSTIC      FRACTURE SURGERY      Fracture of right 4th finger repaired - no metal    HERNIA SURGERY  05/09/2019    Abd.    HIP REPLACEMENT SURGERY      right hip replacement    LAPAROSCOPIC CHOLECYSTECTOMY  05/06/2023    Robotic cholecystectomy    LAPAROSCOPY, SURGICAL; COLECTOMY, PARTIAL, W/ ANASTOMOS  02/2011    OTHER SURGICAL HISTORY  02/05/2019    Laparoscopic-assisted small  bowel resection with anastomosis    OTHER SURGICAL HISTORY  2019    Repair of incarcerated incisional hernia    TONSILLECTOMY      VASECTOMY        Family History   Problem Relation Age of Onset    Diabetes Maternal Grandfather     Cancer Father         melanoma      Other (melanoma) Father         and pts uncle    Other (multiple sclerosis) Mother     Other (Other) Mother         MS    Other Mother         MS    Other (breast cancer) Maternal Grandmother     Other (heart attack) Paternal Grandmother 70      Social History     Socioeconomic History    Marital status:    Tobacco Use    Smoking status: Never    Smokeless tobacco: Never   Vaping Use    Vaping Use: Never used   Substance and Sexual Activity    Alcohol use: Yes     Alcohol/week: 2.0 - 3.0 standard drinks of alcohol     Types: 2 - 3 Cans of beer per week     Comment: 2 to 3 beers a week    Drug use: No   Other Topics Concern    Caffeine Concern Yes    Stress Concern No    Weight Concern Yes     Comment: about 15 to 20 pounds over weight    Special Diet No    Exercise Yes    Seat Belt Yes           REVIEW OF SYSTEMS:   Today reviewed systens as documented below  GENERAL HEALTH: feels well otherwise  SKIN: Refer to exam below  RESPIRATORY: denies shortness of breath with exertion  CARDIOVASCULAR: denies chest pain on exertion  GI: denies abdominal pain and denies heartburn  NEURO: denies headaches    EXAM:   There were no vitals taken for this visit.  GENERAL: well developed, well nourished, in no apparent distress  EXTREMITIES:   1. Integument: The skin on his feet is warm and dry several of his toenails have deformity a couple of nails on his right foot have clubbing and there is hyperkeratotic tissue and subungual keratosis underneath the second toe of his left foot which was trimmed and debrided today.   2. Vascular: The patient has palpable pulses   3. Neurologic: Patient has grossly intact sensorium   4. Musculoskeletal: The  patient has good muscle strength.  There are no signs of infection on the patient's feet.    ASSESSMENT AND PLAN:   Diagnoses and all orders for this visit:    Pain of toe of left foot    Onychomycosis    Onychodystrophy    Dermatophytosis of nail    Heloma durum    Hammer toe of left foot        Plan: Today using a nail nippers were trimmed and debrided toenails 1-5 manually and mechanically in girth and width as far down to healthy tissue as possible on both feet.  This was done uneventfully there was no hemorrhage.  There is mild incurvation of the medial and lateral nail borders of the hallux which using a slant back technique were removed and they would not get ingrown.   Follow-up 2 to 3 months.    The patient indicates understanding of these issues and agrees to the plan.    Eric Rios DPM

## 2024-04-08 RX ORDER — AMLODIPINE BESYLATE 10 MG/1
10 TABLET ORAL DAILY
Qty: 90 TABLET | Refills: 0 | Status: SHIPPED | OUTPATIENT
Start: 2024-04-08

## 2024-05-06 ENCOUNTER — OFFICE VISIT (OUTPATIENT)
Dept: PODIATRY CLINIC | Facility: CLINIC | Age: 64
End: 2024-05-06
Payer: COMMERCIAL

## 2024-05-06 DIAGNOSIS — B35.1 ONYCHOMYCOSIS: ICD-10-CM

## 2024-05-06 DIAGNOSIS — M20.42 HAMMER TOE OF LEFT FOOT: ICD-10-CM

## 2024-05-06 DIAGNOSIS — L84 HELOMA DURUM: ICD-10-CM

## 2024-05-06 DIAGNOSIS — M79.675 PAIN OF TOE OF LEFT FOOT: Primary | ICD-10-CM

## 2024-05-06 DIAGNOSIS — L60.3 ONYCHODYSTROPHY: ICD-10-CM

## 2024-05-06 PROCEDURE — 99213 OFFICE O/P EST LOW 20 MIN: CPT | Performed by: PODIATRIST

## 2024-05-06 NOTE — PROGRESS NOTES
Austyn Wynne is a 63 year old male.   Chief Complaint   Patient presents with    Toenail Care     Nail care and foot check     Follow - Up     Left 2nd digit- patient denies pain          HPI:   Patient returns to the clinic for follow-up on his left foot second toe and for nail care.  The patient still has back problems cannot bend over and provide self-care is got no one to help him do it.  He is also complaining of some swelling in his feet and ankles.  In addition to that the patient has recently become a grandfather.  At today's visit reviewed nurse's history as taken above, allergies medications and medical history as documented below.  All changes duly noted  Allergies: Patient has no known allergies.   Current Outpatient Medications   Medication Sig Dispense Refill    AMLODIPINE 10 MG Oral Tab TAKE ONE TABLET BY MOUTH ONCE DAILY 90 tablet 0    losartan 100 MG Oral Tab Take 1 tablet (100 mg total) by mouth daily. 90 tablet 3    FENOFIBRATE 145 MG Oral Tab TAKE 1 TABLET BY MOUTH DAILY 90 tablet 3    PRAVASTATIN SODIUM 80 MG Oral Tab TAKE 1 TABLET BY MOUTH DAILY 90 tablet 3    docusate sodium 100 MG Oral Cap Take 100 mg by mouth 2 (two) times daily. 30 capsule 0    diphenhydrAMINE HCl (BENADRYL ALLERGY OR) Take 1 tablet by mouth one time. For Hibiclens.      methocarbamol 750 MG Oral Tab Take 1 tablet (750 mg total) by mouth 3 (three) times daily.      oxyCODONE-acetaminophen 5-325 MG Oral Tab Take 1-2 tablets by mouth every 4 to 6 hours.      tamsulosin 0.4 MG Oral Cap Take 1 capsule (0.4 mg total) by mouth daily.      traMADol 50 MG Oral Tab Take 1 tablet (50 mg total) by mouth every 8 (eight) hours as needed for Pain. 21 tablet 0    omega-3 fatty acids 1000 MG Oral Cap Take 1,000 mg by mouth As Directed.      Acidophilus/Pectin Oral Cap Take 1 capsule by mouth daily as needed.      Calcium Carbonate Antacid (TUMS OR) Take 1 tablet by mouth daily as needed.      Omeprazole 40 MG Oral Capsule Delayed  Release Take 1 capsule (40 mg total) by mouth daily. 30 capsule 0    folic acid 1 MG Oral Tab Take 1 tablet (1 mg total) by mouth daily.      Psyllium (METAMUCIL FIBER OR) Take 1 tablet by mouth daily.      Cholecalciferol (VITAMIN D) 2000 UNITS Oral Cap Take 1 capsule (2,000 Units total) by mouth daily.      Cyanocobalamin (VITAMIN B 12 OR) Take 1 tablet by mouth daily.        Teriflunomide 14 MG Oral Tab Take 14 mg by mouth daily. AUBAGIO         Past Medical History:    Calculus of kidney    Cancer (HCC)    colon    Carcinoid tumor (HCC)    cecal carcinoid    Chest pain    Colon polyps    Diverticulitis    left colon    Diverticulosis    Esophageal reflux    Fe deficiency anemia    Frequent urination    Believed to be side affect of MS    High blood pressure    High cholesterol    Multiple sclerosis (HCC)    Nephrolithiasis    Pain in joints    Hip pain...total hip replacement 6-2020    Pneumonia    Prediabetes    Diet control    Sleep apnea    Testicular lump    right    Visual impairment    glasses    Wears glasses    Glasses      Past Surgical History:   Procedure Laterality Date    Appendectomy  02/2011    Removed in Colon cancer surgury    Bowel resection      Cholecystectomy      Colectomy  2011 & 2019    To remove colon cancer    Colonoscopy  01/31/2012    Colonoscopy  01/2015    since colon cancer have 1 every 3 years    Ercp,diagnostic      Fracture surgery      Fracture of right 4th finger repaired - no metal    Hernia surgery  05/09/2019    Abd.    Hip replacement surgery      right hip replacement    Laparoscopic cholecystectomy  05/06/2023    Robotic cholecystectomy    Laparoscopy, surgical; colectomy, partial, w/ anastomos  02/2011    Other surgical history  02/05/2019    Laparoscopic-assisted small bowel resection with anastomosis    Other surgical history  03/08/2019    Repair of incarcerated incisional hernia    Tonsillectomy      Vasectomy  1990      Family History   Problem Relation Age of  Onset    Diabetes Maternal Grandfather     Cancer Father         melanoma      Other (melanoma) Father         and pts uncle    Other (multiple sclerosis) Mother     Other (Other) Mother         MS    Other Mother         MS    Other (breast cancer) Maternal Grandmother     Other (heart attack) Paternal Grandmother 70      Social History     Socioeconomic History    Marital status:    Tobacco Use    Smoking status: Never    Smokeless tobacco: Never   Vaping Use    Vaping status: Never Used   Substance and Sexual Activity    Alcohol use: Yes     Alcohol/week: 2.0 - 3.0 standard drinks of alcohol     Types: 2 - 3 Cans of beer per week     Comment: 2 to 3 beers a week    Drug use: No   Other Topics Concern    Caffeine Concern Yes    Stress Concern No    Weight Concern Yes     Comment: about 15 to 20 pounds over weight    Special Diet No    Exercise Yes    Seat Belt Yes           REVIEW OF SYSTEMS:   Today reviewed systens as documented below  GENERAL HEALTH: feels well otherwise  SKIN: Refer to exam below  RESPIRATORY: denies shortness of breath with exertion  CARDIOVASCULAR: denies chest pain on exertion  GI: denies abdominal pain and denies heartburn  NEURO: denies headaches    EXAM:   There were no vitals taken for this visit.  GENERAL: well developed, well nourished, in no apparent distress  EXTREMITIES:   1. Integument: The skin on his feet is warm and dry several of his toenails have deformity a couple of nails on his right foot have clubbing and there is hyperkeratotic tissue and subungual keratosis underneath the second toe of his left foot which was trimmed and debrided today.   2. Vascular: The patient has palpable pulses.  The patient has +1 pitting edema in the ankles.   3. Neurologic: Patient has grossly intact sensorium   4. Musculoskeletal: The patient has good muscle strength.  There are no signs of infection on the patient's feet.    ASSESSMENT AND PLAN:   Diagnoses and all orders for  this visit:    Pain of toe of left foot    Onychomycosis    Onychodystrophy    Heloma durum    Hammer toe of left foot        Plan: Today using a nail nippers were trimmed and debrided toenails 1-5 manually and mechanically in girth and width as far down to healthy tissue as possible on both feet.  This was done uneventfully there was no hemorrhage.  There is mild incurvation of the medial and lateral nail borders of the hallux which using a slant back technique were removed and they would not get ingrown.   Follow-up 2 to 3 months.  Patient was advised at home care as well but again he cannot provide it has no one to help him.  Discussed with the patient he should call his medical doctor make the doctor aware of the swelling he may have slight fluid overload may be need a water pill to help decrease that.    The patient indicates understanding of these issues and agrees to the plan.    Eric Rios DPM

## 2024-05-10 ENCOUNTER — HOSPITAL ENCOUNTER (OUTPATIENT)
Dept: ULTRASOUND IMAGING | Facility: HOSPITAL | Age: 64
Discharge: HOME | End: 2024-05-10
Attending: INTERNAL MEDICINE
Payer: COMMERCIAL

## 2024-05-10 ENCOUNTER — OFFICE VISIT (OUTPATIENT)
Dept: INTERNAL MEDICINE CLINIC | Facility: CLINIC | Age: 64
End: 2024-05-10
Payer: COMMERCIAL

## 2024-05-10 VITALS
HEART RATE: 64 BPM | OXYGEN SATURATION: 99 % | DIASTOLIC BLOOD PRESSURE: 62 MMHG | SYSTOLIC BLOOD PRESSURE: 128 MMHG | TEMPERATURE: 97 F | HEIGHT: 70 IN | RESPIRATION RATE: 18 BRPM | WEIGHT: 197.38 LBS | BODY MASS INDEX: 28.26 KG/M2

## 2024-05-10 DIAGNOSIS — I10 ESSENTIAL HYPERTENSION: ICD-10-CM

## 2024-05-10 DIAGNOSIS — M51.26 HNP (HERNIATED NUCLEUS PULPOSUS), LUMBAR: ICD-10-CM

## 2024-05-10 DIAGNOSIS — M79.89 RIGHT LEG SWELLING: Primary | ICD-10-CM

## 2024-05-10 DIAGNOSIS — Z98.1 S/P LUMBAR FUSION: ICD-10-CM

## 2024-05-10 DIAGNOSIS — M79.89 RIGHT LEG SWELLING: ICD-10-CM

## 2024-05-10 DIAGNOSIS — M48.061 SPINAL STENOSIS OF LUMBAR REGION, UNSPECIFIED WHETHER NEUROGENIC CLAUDICATION PRESENT: ICD-10-CM

## 2024-05-10 PROCEDURE — 93971 EXTREMITY STUDY: CPT | Performed by: INTERNAL MEDICINE

## 2024-05-10 PROCEDURE — 99214 OFFICE O/P EST MOD 30 MIN: CPT | Performed by: INTERNAL MEDICINE

## 2024-05-10 NOTE — PROGRESS NOTES
Austyn Wynne  5/24/1960    Chief Complaint   Patient presents with    Follow - Up     Rm 6       SUBJECTIVE   Austyn Wynne is a 63 year old male who presents for evaluation.    The patient reports a 7-day duration of a right leg swelling distal to the right knee, without any associated pain.  Symptom onset was without any trauma or other identified trigger.  He reports no significant dietary changes, including fluid intake or sodium consumption.  He denies any overlying redness or skin changes.  No chest pain or shortness of breath.  No reported abdominal distention.  Today he presents for evaluation.    Review of Systems   No f/c/chest pain or sob. No cough. No abd pain/n/v/d. No ha or dizziness. No numbness, tingling, or weakness. No other complaints today.    Current Outpatient Medications   Medication Sig Dispense Refill    AMLODIPINE 10 MG Oral Tab TAKE ONE TABLET BY MOUTH ONCE DAILY 90 tablet 0    losartan 100 MG Oral Tab Take 1 tablet (100 mg total) by mouth daily. 90 tablet 3    FENOFIBRATE 145 MG Oral Tab TAKE 1 TABLET BY MOUTH DAILY 90 tablet 3    PRAVASTATIN SODIUM 80 MG Oral Tab TAKE 1 TABLET BY MOUTH DAILY 90 tablet 3    docusate sodium 100 MG Oral Cap Take 100 mg by mouth 2 (two) times daily. 30 capsule 0    diphenhydrAMINE HCl (BENADRYL ALLERGY OR) Take 1 tablet by mouth one time. For Hibiclens.      methocarbamol 750 MG Oral Tab Take 1 tablet (750 mg total) by mouth 3 (three) times daily.      oxyCODONE-acetaminophen 5-325 MG Oral Tab Take 1-2 tablets by mouth every 4 to 6 hours.      tamsulosin 0.4 MG Oral Cap Take 1 capsule (0.4 mg total) by mouth daily.      traMADol 50 MG Oral Tab Take 1 tablet (50 mg total) by mouth every 8 (eight) hours as needed for Pain. 21 tablet 0    omega-3 fatty acids 1000 MG Oral Cap Take 1,000 mg by mouth As Directed.      Acidophilus/Pectin Oral Cap Take 1 capsule by mouth daily as needed.      Calcium Carbonate Antacid (TUMS OR) Take 1 tablet by mouth  daily as needed.      Omeprazole 40 MG Oral Capsule Delayed Release Take 1 capsule (40 mg total) by mouth daily. 30 capsule 0    folic acid 1 MG Oral Tab Take 1 tablet (1 mg total) by mouth daily.      Psyllium (METAMUCIL FIBER OR) Take 1 tablet by mouth daily.      Cholecalciferol (VITAMIN D) 2000 UNITS Oral Cap Take 1 capsule (2,000 Units total) by mouth daily.      Cyanocobalamin (VITAMIN B 12 OR) Take 1 tablet by mouth daily.        Teriflunomide 14 MG Oral Tab Take 14 mg by mouth daily. AUBAGIO         No Known Allergies   Past Medical History:    Calculus of kidney    Cancer (HCC)    colon    Carcinoid tumor (HCC)    cecal carcinoid    Chest pain    Colon polyps    Diverticulitis    left colon    Diverticulosis    Esophageal reflux    Fe deficiency anemia    Frequent urination    Believed to be side affect of MS    High blood pressure    High cholesterol    Multiple sclerosis (HCC)    Nephrolithiasis    Pain in joints    Hip pain...total hip replacement 6-2020    Pneumonia    Prediabetes    Diet control    Sleep apnea    Testicular lump    right    Visual impairment    glasses    Wears glasses    Glasses      Patient Active Problem List   Diagnosis    Diverticulitis    Colon polyps    CAROLINA on CPAP    Nephrolithiasis    Benign essential HTN    Fe deficiency anemia    MS (multiple sclerosis) (HCC)    B12 deficiency    Dyspnea, unspecified type    Chest pain with moderate risk for cardiac etiology    Carcinoid tumor of cecum (HCC)    Hyperlipidemia    Acute blood loss as cause of postoperative anemia    Incisional hernia    Anemia    Neutropenia (HCC)    Primary osteoarthritis of right hip    Personal history of colonic polyps    Diverticulosis    Internal hemorrhoids    Chronic right-sided low back pain with right-sided sciatica    Lumbar foraminal stenosis    Displacement of lumbar intervertebral disc with radiculopathy    Neurogenic bladder    Cholangitis due to bile duct calculus with obstruction     Transaminitis    Chronic anemia    Primary hypertension    Pre-op testing    S/P lumbar fusion    Spinal stenosis, lumbar      Past Surgical History:   Procedure Laterality Date    Appendectomy  02/2011    Removed in Colon cancer surgury    Bowel resection      Cholecystectomy      Colectomy  2011 & 2019    To remove colon cancer    Colonoscopy  01/31/2012    Colonoscopy  01/2015    since colon cancer have 1 every 3 years    Ercp,diagnostic      Fracture surgery      Fracture of right 4th finger repaired - no metal    Hernia surgery  05/09/2019    Abd.    Hip replacement surgery      right hip replacement    Laparoscopic cholecystectomy  05/06/2023    Robotic cholecystectomy    Laparoscopy, surgical; colectomy, partial, w/ anastomos  02/2011    Other surgical history  02/05/2019    Laparoscopic-assisted small bowel resection with anastomosis    Other surgical history  03/08/2019    Repair of incarcerated incisional hernia    Tonsillectomy      Vasectomy  1990      Social History     Socioeconomic History    Marital status:    Tobacco Use    Smoking status: Never    Smokeless tobacco: Never   Vaping Use    Vaping status: Never Used   Substance and Sexual Activity    Alcohol use: Yes     Alcohol/week: 2.0 - 3.0 standard drinks of alcohol     Types: 2 - 3 Cans of beer per week     Comment: 2 to 3 beers a week    Drug use: No   Other Topics Concern    Caffeine Concern Yes    Stress Concern No    Weight Concern Yes     Comment: about 15 to 20 pounds over weight    Special Diet No    Exercise Yes    Seat Belt Yes     Social Determinants of Health     Financial Resource Strain: Low Risk  (5/8/2023)    Financial Resource Strain     Difficulty of Paying Living Expenses: Not very hard     Med Affordability: No   Food Insecurity: No Food Insecurity (12/12/2023)    Food Insecurity     Food Insecurity: Never true   Transportation Needs: No Transportation Needs (12/12/2023)    Transportation Needs     Lack of  Transportation: No   Housing Stability: Low Risk  (12/12/2023)    Housing Stability     Housing Instability: No         OBJECTIVE:   /62 (BP Location: Left arm, Patient Position: Sitting, Cuff Size: adult)   Pulse 64   Temp 97 °F (36.1 °C) (Skin)   Resp 18   Ht 5' 10\" (1.778 m)   Wt 197 lb 6.4 oz (89.5 kg)   SpO2 99%   BMI 28.32 kg/m²   Constitutional: Oriented to person, place, and time. No distress.   HEENT:  Normocephalic and atraumatic.  Cardiovascular: Normal rate, regular rhythm and intact distal pulses.  No murmur, rubs or gallops.  No JVD.  Pulmonary/Chest: Effort normal and breath sounds normal. No respiratory distress.  Abdominal: Soft, nontender, and nondistended.  Musculoskeletal: Right leg 1+ pitting edema distal to the right knee to the toes without overlying erythema or ecchymoses.  Left leg trace pitting edema distal to the left knee to the toes.  Homans' sign negative bilaterally.  Skin: Skin is warm and dry. No rash.  Psychiatric: Normal mood and affect.     ASSESSMENT AND PLAN:   Austyn Wnyne is a 63 year old male who presents for evaluation.    Despite negative Homans' sign bilaterally, following lumbar spinal fusion in December for chronic lower back pain for lumbar foraminal stenosis HNP of lumbar spine, and displacement of intervertebral disc of lumbar spine, the patient has been more sedentary, and symptom onset was abrupt and without identified trigger.  Stat DVT ultrasound of right lower extremity to rule out DVT.  If negative, will pursue diuretic therapy, and if persistent, will order 2D echocardiogram to rule out cardiac etiology.  Compliant with amlodipine 10 mg daily for hypertension which is well-controlled, however this has been his regimen for several years without associated symptoms, and swelling is extending well beyond the ankles and foot.    The patient indicates understanding of these issues and agrees to the plan.  TODAY'S ORDERS     No orders of the  defined types were placed in this encounter.      Meds & Refills:  Requested Prescriptions      No prescriptions requested or ordered in this encounter       Imaging & Consults:  US VENOUS DOPPLER LEG LEFT - DIAG IMG (CPT=93971)    No follow-ups on file.  There are no Patient Instructions on file for this visit.    All questions were answered and the patient agrees with the plan.     Thank you,  Michael Borden MD

## 2024-05-21 RX ORDER — FUROSEMIDE 20 MG/1
20 TABLET ORAL
Qty: 10 TABLET | Refills: 0 | Status: SHIPPED | OUTPATIENT
Start: 2024-05-21

## 2024-05-21 NOTE — TELEPHONE ENCOUNTER
REFILL PASSED PER [facility] PROTOCOLS  Please review received 10 tablets on 5/11/2024    Requested Prescriptions   Pending Prescriptions Disp Refills    FUROSEMIDE 20 MG Oral Tab [Pharmacy Med Name: Furosemide 20 Mg Tab Risi] 10 tablet 0     Sig: TAKE ONE TABLET BY MOUTH DAILY AS NEEDED       Hypertension Medications Protocol Passed - 5/18/2024  9:47 AM        Passed - CMP or BMP in past 12 months        Passed - Last BP reading less than 140/90     BP Readings from Last 1 Encounters:   05/10/24 128/62               Passed - In person appointment or virtual visit in the past 12 mos or appointment in next 3 mos     Recent Outpatient Visits              1 week ago Right leg swelling    [address] Michael Borden MD    Office Visit    1 week ago Actinic keratoses    [facility] Scott, MD    Office Visit    2 weeks ago Pain of toe of left foot    [facility] Eric Rios DPM    Office Visit    1 month ago Neoplasm of uncertain behavior of skin    [facility] Scott, MD    Office Visit    2 months ago Pain of toe of left foot    [facility] Eric Rios DPM    Office Visit          Future Appointments         Provider Department Appt Notes    In 3 months Eric Rios DPM [facility]     In 4 months Tavo Lynch MD [facility]                     Passed - EGFRCR or GFRNAA > 50     GFR Evaluation  EGFRCR: 53 , resulted on 12/8/2023               Future Appointments         Provider Department Appt Notes    In 3 months Eric Rios DPM [facility]     In 4 months Tavo Lynch MD [facility]           Recent Outpatient Visits              1 week ago Right leg swelling    [place]  [address] Michael Borden MD    Office Visit    1 week ago Actinic keratoses    [facility]EL[facility] URENA, Tavo Meadows MD    Office Visit    2 weeks ago Pain of toe of left foot    [facility] Eric Rios DPM    Office Visit    1 month ago Neoplasm of uncertain behavior of skin    [facility] Scott, MD    Office Visit    2 months ago Pain of toe of left foot    [facility] Eric Rois DPM    Office Visit

## 2024-06-03 ENCOUNTER — OFFICE VISIT (OUTPATIENT)
Dept: FAMILY MEDICINE CLINIC | Facility: CLINIC | Age: 64
End: 2024-06-03
Payer: COMMERCIAL

## 2024-06-03 DIAGNOSIS — Z23 NEED FOR TDAP VACCINATION: Primary | ICD-10-CM

## 2024-06-03 PROCEDURE — 90715 TDAP VACCINE 7 YRS/> IM: CPT | Performed by: PHYSICIAN ASSISTANT

## 2024-06-03 PROCEDURE — 90471 IMMUNIZATION ADMIN: CPT | Performed by: PHYSICIAN ASSISTANT

## 2024-06-03 NOTE — PROGRESS NOTES
Here for Tdap vaccine. Reports no previous reaction to any vaccine. Feels well today. Consent completed and reviewed. VIS given. Tdap vaccine administered to Rt deltoid. Pt tolerated well.

## 2024-07-03 ENCOUNTER — OFFICE VISIT (OUTPATIENT)
Dept: FAMILY MEDICINE CLINIC | Facility: CLINIC | Age: 64
End: 2024-07-03
Payer: COMMERCIAL

## 2024-07-03 VITALS
BODY MASS INDEX: 25.73 KG/M2 | RESPIRATION RATE: 18 BRPM | TEMPERATURE: 100 F | SYSTOLIC BLOOD PRESSURE: 134 MMHG | DIASTOLIC BLOOD PRESSURE: 70 MMHG | HEIGHT: 72 IN | HEART RATE: 87 BPM | WEIGHT: 190 LBS | OXYGEN SATURATION: 97 %

## 2024-07-03 DIAGNOSIS — J06.9 VIRAL UPPER RESPIRATORY TRACT INFECTION: Primary | ICD-10-CM

## 2024-07-03 DIAGNOSIS — Z11.52 ENCOUNTER FOR SCREENING FOR COVID-19: ICD-10-CM

## 2024-07-03 PROCEDURE — 87635 SARS-COV-2 COVID-19 AMP PRB: CPT | Performed by: FAMILY MEDICINE

## 2024-07-03 PROCEDURE — 99213 OFFICE O/P EST LOW 20 MIN: CPT | Performed by: FAMILY MEDICINE

## 2024-07-03 NOTE — PATIENT INSTRUCTIONS
Hold Paxlovid until COVID results are back   Your testing will be back in 24-36 hours.  If positive, start taking Paxlovid.  Please discontinue pravastatin and monitor your blood pressure, as amlodipine levels can be increased by the medication.   Avoid alcohol while taking the medication.     Use OTC meds for symptom control as needed--  Ibuprofen/Tylenol for fever/pain  Use Benadryl at bedtime to reduce drainage and promote rest.  Zyrtec/Claritin/Allegra in the AM to reduce nasal drainage without sedation.   Use saline nasal sprays to reduce congestion and thin secretions.   Use Delsym for cough.   Consider applying micaela's vapo-rub or eucayptus oil to chest and feet at bedtime to reduce chest and nasal congestion.   Warm tea with honey, cough lozenges, vaporizers/steam etc.    If symptoms worsen, go to the ED for urgent evaluation and if no better in 2-3 days, follow-up with your PCP.

## 2024-07-03 NOTE — PROGRESS NOTES
CHIEF COMPLAINT:     Chief Complaint   Patient presents with    Cough     Cough and chest congestion.  Symptoms for 4 days    Recent travel   OTC: [place]           HPI:   Austyn Wynne is a 64 year old male presents to clinic with complaints of cough and chest congestion x 4 days.  Reports no chills, no fever, no headache, no upset stomach, no ear pain, no rash, no diarrhea, no loss of smell/taste.    COVID exposure: none known  Sick contacts: none-- but pt was recently on cruise in the [employer] [place]  COVID testing: none    Current Outpatient Medications   Medication Sig Dispense Refill    nirmatrelvir-ritonavir 150-100 MG Oral Tablet Therapy Pack Take one nirmatrelvir tablet (150mg) with one ritonavir tablet (100mg) together twice daily for 5 days.   (Nirmatrelvir dose is renally adjusted) 20 tablet 0    furosemide 20 MG Oral Tab Take 1 tablet (20 mg total) by mouth daily as needed. 10 tablet 0    AMLODIPINE 10 MG Oral Tab TAKE ONE TABLET BY MOUTH ONCE DAILY 90 tablet 0    losartan 100 MG Oral Tab Take 1 tablet (100 mg total) by mouth daily. 90 tablet 3    FENOFIBRATE 145 MG Oral Tab TAKE 1 TABLET BY MOUTH DAILY 90 tablet 3    PRAVASTATIN SODIUM 80 MG Oral Tab TAKE 1 TABLET BY MOUTH DAILY 90 tablet 3    docusate sodium 100 MG Oral Cap Take 100 mg by mouth 2 (two) times daily. 30 capsule 0    diphenhydrAMINE HCl (BENADRYL ALLERGY OR) Take 1 tablet by mouth one time. For Hibiclens.      methocarbamol 750 MG Oral Tab Take 1 tablet (750 mg total) by mouth 3 (three) times daily.      oxyCODONE-acetaminophen 5-325 MG Oral Tab Take 1-2 tablets by mouth every 4 to 6 hours.      tamsulosin 0.4 MG Oral Cap Take 1 capsule (0.4 mg total) by mouth daily.      traMADol 50 MG Oral Tab Take 1 tablet (50 mg total) by mouth every 8 (eight) hours as needed for Pain. 21 tablet 0    omega-3 fatty acids 1000 MG Oral Cap Take 1,000 mg by mouth As Directed.      Acidophilus/Pectin Oral Cap Take 1 capsule by mouth daily as  needed.      Calcium Carbonate Antacid (TUMS OR) Take 1 tablet by mouth daily as needed.      Omeprazole 40 MG Oral Capsule Delayed Release Take 1 capsule (40 mg total) by mouth daily. 30 capsule 0    folic acid 1 MG Oral Tab Take 1 tablet (1 mg total) by mouth daily.      Psyllium (METAMUCIL FIBER OR) Take 1 tablet by mouth daily.      Cholecalciferol (VITAMIN D) 2000 UNITS Oral Cap Take 1 capsule (2,000 Units total) by mouth daily.      Cyanocobalamin (VITAMIN B 12 OR) Take 1 tablet by mouth daily.        Teriflunomide 14 MG Oral Tab Take 14 mg by mouth daily. AUBAGIO         Past Medical History:    Calculus of kidney    Cancer (HCC)    colon    Carcinoid tumor (HCC)    cecal carcinoid    Chest pain    Colon polyps    Diverticulitis    left colon    Diverticulosis    Esophageal reflux    Fe deficiency anemia    Frequent urination    Believed to be side affect of MS    High blood pressure    High cholesterol    Multiple sclerosis (HCC)    Nephrolithiasis    Pain in joints    Hip pain...total hip replacement 6-2020    Pneumonia    Prediabetes    Diet control    Sleep apnea    Testicular lump    right    Visual impairment    glasses    Wears glasses    Glasses      Social History:  Social History     Socioeconomic History    Marital status:    Tobacco Use    Smoking status: Never    Smokeless tobacco: Never   Vaping Use    Vaping status: Never Used   Substance and Sexual Activity    Alcohol use: Yes     Alcohol/week: 2.0 - 3.0 standard drinks of alcohol     Types: 2 - 3 Cans of beer per week     Comment: 2 to 3 beers a week    Drug use: No   Other Topics Concern    Caffeine Concern Yes    Stress Concern No    Weight Concern Yes     Comment: about 15 to 20 pounds over weight    Special Diet No    Exercise Yes    Seat Belt Yes     Social Determinants of Health     Financial Resource Strain: Low Risk  (5/8/2023)    Financial Resource Strain     Difficulty of Paying Living Expenses: Not very hard     Med  Affordability: No   Food Insecurity: No Food Insecurity (12/12/2023)    Food Insecurity     Food Insecurity: Never true   Transportation Needs: No Transportation Needs (12/12/2023)    Transportation Needs     Lack of Transportation: No   Housing Stability: Low Risk  (12/12/2023)    Housing Stability     Housing Instability: No        REVIEW OF SYSTEMS:   GENERAL HEALTH: feels well otherwise, normal appetite  SKIN: denies any unusual skin lesions or rashes  HEENT: See HPI  RESPIRATORY: denies shortness of breath or wheezing  CARDIOVASCULAR: denies chest pain or palpitations   GI: denies vomiting or diarrhea  NEURO: denies dizziness or lightheadedness    EXAM:   /70   Pulse 87   Temp 99.5 °F (37.5 °C) (Temporal)   Resp 18   Ht 6' (1.829 m)   Wt 190 lb (86.2 kg)   SpO2 97%   BMI 25.77 kg/m²   GENERAL: well developed, well nourished, in no apparent distress  SKIN: no rashes,no suspicious lesions  HEAD: atraumatic, normocephalic  EYES: conjunctiva clear  EARS: TM's clear, non-injected, no bulging, retraction, or fluid bilaterally  NOSE: nostrils patent, clear nasal mucus, nasal mucosa pink and boggy  THROAT: oral mucosa pink, moist. Posterior pharynx mildly erythematous. No exudates. Tonsils 2/4.  Uvula is midline.  Breath is not malodorous.  No trismus, hoarseness, muffled voice, or stridor.    NECK: supple  LUNGS: clear to auscultation bilaterally. Breathing is non labored.  CARDIO: RRR without murmur  EXTREMITIES: no cyanosis, clubbing or edema  LYMPH: no anterior cervical lymphadenopathy, no submandibular lymphadenopathy.  No  posterior cervical or occipital lymphadenopathy.    No results found for this or any previous visit (from the past 24 hour(s)).        ASSESSMENT AND PLAN:     Encounter Diagnoses   Name Primary?    Viral upper respiratory tract infection Yes    Encounter for screening for COVID-19        Orders Placed This Encounter   Procedures    SARS-CoV-2 by PCR       Meds & Refills for this  Visit:  Requested Prescriptions     Signed Prescriptions Disp Refills    nirmatrelvir-ritonavir 150-100 MG Oral Tablet Therapy Pack 20 tablet 0     Sig: Take one nirmatrelvir tablet (150mg) with one ritonavir tablet (100mg) together twice daily for 5 days.   (Nirmatrelvir dose is renally adjusted)       Imaging & Consults:  None     Testing as above.  Pt given Rx for Paxlovid to take if covid is positive.  Advised to hold pravastatin and monitor bp and discontinue amlodipine if bp is low.  Avoid ETOH while taking paxlovid.    Comfort measures explained.   Reviewed quarantine guidelines.    Follow up with PCP in 3-5 days if not improving, condition worsens, or fever greater than or equal to 100.4 persists for 72 hours.    Verbalized understanding.    Patient Instructions   Hold Paxlovid until COVID results are back   Your testing will be back in 24-36 hours.  If positive, start taking Paxlovid.  Please discontinue pravastatin and monitor your blood pressure, as amlodipine levels can be increased by the medication.   Avoid alcohol while taking the medication.     Use OTC meds for symptom control as needed--  Ibuprofen/Tylenol for fever/pain  Use Benadryl at bedtime to reduce drainage and promote rest.  Zyrtec/Claritin/Allegra in the AM to reduce nasal drainage without sedation.   Use saline nasal sprays to reduce congestion and thin secretions.   Use Delsym for cough.   Consider applying micaela's vapo-rub or eucayptus oil to chest and feet at bedtime to reduce chest and nasal congestion.   Warm tea with honey, cough lozenges, vaporizers/steam etc.    If symptoms worsen, go to the ED for urgent evaluation and if no better in 2-3 days, follow-up with your PCP.

## 2024-07-04 LAB — SARS-COV-2 RNA RESP QL NAA+PROBE: NOT DETECTED

## 2024-07-11 RX ORDER — AMLODIPINE BESYLATE 10 MG/1
10 TABLET ORAL DAILY
Qty: 90 TABLET | Refills: 3 | Status: SHIPPED | OUTPATIENT
Start: 2024-07-11

## 2024-07-11 NOTE — TELEPHONE ENCOUNTER
Refill passed per [facility] protocol.  Requested Prescriptions   Pending Prescriptions Disp Refills    AMLODIPINE 10 MG Oral Tab [Pharmacy Med Name: Amlodipine Besylate 10 Mg Tab Unic] 90 tablet 0     Sig: TAKE ONE TABLET BY MOUTH ONCE DAILY       Hypertension Medications Protocol Passed - 7/8/2024 10:36 AM        Passed - CMP or BMP in past 12 months        Passed - Last BP reading less than 140/90     BP Readings from Last 1 Encounters:   07/03/24 134/70               Passed - In person appointment or virtual visit in the past 12 mos or appointment in next 3 mos     Recent Outpatient Visits              1 week ago Viral upper respiratory tract infection    [facility], [address] [place] [address] Jessica Hernandez PA-C    Office Visit    1 month ago Need for Tdap vaccination    [facility]In [address] Shae Tripp PA-C    Office Visit    2 months ago Right leg swelling    [address] Michael Borden MD    Office Visit    2 months ago Actinic keratoses    [facility] Scott, MD    Office Visit    2 months ago Pain of toe of left foot    [facility] Eric Rios DPM    Office Visit          Future Appointments         Provider Department Appt Notes    In 1 month Eric Rios DPM [facility]     In 3 months Tavo Lynch MD [facility]                     Passed - EGFRCR or GFRNAA > 50     GFR Evaluation  EGFRCR: 53 , resulted on 12/8/2023             Recent Outpatient Visits              1 week ago Viral upper respiratory tract infection    [facility], [address] [place] [address] Jessica Hernandez PA-C    Office Visit    1 month ago [facility] [address]  [facility] Shae Tripp PA-C    Office Visit    2 months ago Right leg swelling    [address] Michael Borden MD    Office Visit    2 months ago Actinic keratoses    [facility] Scott, MD    Office Visit    2 months ago Pain of toe of left foot    [facility] Eric Rios DPM    Office Visit          Future Appointments         Provider Department Appt Notes    In 1 month Eric Rios DPM [facility]     In 3 months Tavo Lynch MD [facility]

## 2024-08-07 ENCOUNTER — TELEPHONE (OUTPATIENT)
Dept: PHYSICAL THERAPY | Age: 64
End: 2024-08-07

## 2024-08-07 ENCOUNTER — ORDER TRANSCRIPTION (OUTPATIENT)
Dept: PHYSICAL THERAPY | Facility: HOSPITAL | Age: 64
End: 2024-08-07

## 2024-08-07 DIAGNOSIS — G35 MULTIPLE SCLEROSIS (HCC): Primary | ICD-10-CM

## 2024-08-07 DIAGNOSIS — R29.898 LEG WEAKNESS: ICD-10-CM

## 2024-08-07 DIAGNOSIS — R26.89 IMBALANCE: ICD-10-CM

## 2024-08-07 DIAGNOSIS — R29.898 HAND WEAKNESS: ICD-10-CM

## 2024-08-13 ENCOUNTER — TELEPHONE (OUTPATIENT)
Dept: PHYSICAL THERAPY | Facility: HOSPITAL | Age: 64
End: 2024-08-13

## 2024-08-13 ENCOUNTER — OFFICE VISIT (OUTPATIENT)
Dept: PHYSICAL THERAPY | Age: 64
End: 2024-08-13
Attending: Other
Payer: COMMERCIAL

## 2024-08-13 PROCEDURE — 97161 PT EVAL LOW COMPLEX 20 MIN: CPT

## 2024-08-13 PROCEDURE — 97110 THERAPEUTIC EXERCISES: CPT

## 2024-08-13 NOTE — PROGRESS NOTES
LOWER EXTREMITY EVALUATION:     Diagnosis:    Multiple sclerosis (HCC) (G35)  Leg weakness (R29.898)  Hand weakness (R29.898)  Imbalance (R26.89)      Referring Provider: Diana Stephens  Date of Evaluation:    8/13/2024    Precautions:  MS  Lumbar fusion      R hip replacement 2021 Next MD visit:   none scheduled  Date of Surgery:  lumbar fusion 12/12/23     PATIENT SUMMARY   Austyn Wynne is a 64 year old male who presents to therapy today with complaints of  weakness in B LE and feeling like his balance is off. Pt did have a lumbar fusion surgery back in 12/23 and is doing well. At his follow up appt with his neurologist, she advised to start PT for some strengthening. Pt has not been performing any strengthening or stretches following his lumbar surgery. Pt also feeling like his hands are weak. Discussed referral to OT. Issued therapy putty and digi flex off [employer]. See below for further details     Pt describes pain level current 0/10, at best 0/10, at worst 0/10.   Current functional limitations include  hard time getting up from the ground without assist of a couch. Same in the kitchen, requires assist of the counter to get up when squatting down for objects.  Pt is able to walk up to a mile prior to feeling like his hips are tightening up L >R. Can bike 15-25 miles. Can ascend and descend stairs without any difficulty reciprocally with one railing     Austyn describes prior level of function I. Pt goals include  to increase LE strength and balance to avoid any falling. Pt has not fallen at this time .  Past medical history was reviewed with Austyn. Significant findings include   Past Medical History:    Calculus of kidney    Cancer (HCC)    colon    Carcinoid tumor (HCC)    cecal carcinoid    Chest pain    Colon polyps    Diverticulitis    left colon    Diverticulosis    Esophageal reflux    Fe deficiency anemia    Frequent urination    Believed to be side affect of MS    High blood pressure     High cholesterol    Multiple sclerosis (HCC)    Nephrolithiasis    Pain in joints    Hip pain...total hip replacement 6-2020    Pneumonia    Prediabetes    Diet control    Sleep apnea    Testicular lump    right    Visual impairment    glasses    Wears glasses    Glasses        ASSESSMENT  Austyn presents to physical therapy evaluation with primary c/o LE weakness/ core weakness. The results of the objective tests and measures show loss of hip strength.  Functional deficits include but are not limited to  pt walking with hip IR/add due to weak hip abd/ gluts .  Signs and symptoms are consistent with diagnosis of  LE weakness. Pt was not seen prior to his surgery. Assuming the weakness is more correlated to his MD versus the radiculopathy. Pt and PT discussed evaluation findings, pathology, POC and HEP.  Pt voiced understanding and performs HEP correctly without reported pain. Skilled Physical Therapy is medically necessary to address the above impairments and reach functional goals.     OBJECTIVE:   Observation:  rounded shoulders with forward head posture. Add/IR B hips with ambulation . Does tend to get his toes caught up on the carpet per pt   Palpation:  no soreness- scar looks healed and healthy  Sensation: intact, denies any numbness or tingling.     AROM: (* denotes performed with pain)  Hip Knee Foot/Ankle   Flexion: R 120; L 120  Extension: R WFL; L WFL  Abduction: R  WFL; L WFL  ER: R limited due to R hip replacement 2021; L WFL  IR: R hip replacement lacks full ROM; L  WFL Flexion: R 120; L 120  Extension: R 0; L 0    DF: R 20 degs  lacking due to weakness in sitting off plinth; L  20  PF: R 40; L 40  INV: R WFl; L WFL  EV: R WFL; L WFL  Great toe ext: R WFL; L WFL     Accessory motion: did not assess spine today     Flexibility:  Hip Flexor: R mod, L   Hamstrings: R mod; L mod  Piriformis: R mod; L mod  Quads: R min; L min  Gastroc-soleus: R min; L min    Strength/MMT: (* denotes performed with  pain)  Hip Knee Foot/Ankle   Flexion: R 3/5; L 3/5  Extension: R 3/5; L 3/5  Abduction: R 3/5; L 3/5  ER: R 3/5; L 3/5  IR: R 4/5; L 4/5 Flexion: R 5/5; L 5/5  Extension: R 5/5; L 5/5    DF: R 4/5; L 4/5  PF: R 4/5; L 4/5  INV: R 5/5; L 5/5  EV: R 4/5; L 4/5  Great toe ext: R NA/5; L NA/5     Special tests:    Pt had a hard time with toe walking today due to weakness   Heel walking difficult, but could hold it longer then toe walking  TUG 11 secs  Tandem balance modified- R leading 25 secs L leading 18 secs     Gait: pt ambulates on level ground with normal mechanics  Balance: SLS: R 9 sec, L 9 sec    Today’s Treatment and Response:  issued exercises today post evaluation   Pt education was provided on exam findings, treatment diagnosis, treatment plan, expectations, and prognosis. Pt was also provided recommendations for  sleeping with pillow between the knees in side lying.  Patient was instructed in and issued a HEP for:    bridge 10 x   Clams . Abd RTB 10 x each   HS stretch 2 x 30 secs   Issued putty yellow for hands.    [employer] green digi flex   OT referral from MD     Charges: PT Eval Low Complexity, EX 1       Total Timed Treatment: 45 min     Total Treatment Time: 45 min     Based on clinical rationale and outcome measures, this evaluation involved Low Complexity decision making due to 1-2 personal factors/comorbidities, 3 body structures involved/activity limitations, and evolving symptoms including changing pain levels.  PLAN OF CARE:    Goals: (to be met in 8 visits)  Pt will be I with HEP  Increase hip ER - pt weak, Passively he has full ROM- full AROM to get into figure 4 position, cross his legs  Pt will report walking his one mile without increase in hip tightness  Increase ankle strength for pt to take 3-4 steps toes/ heel walking without dropping no HHA  Increase SL balance to 20 secs B   Increase glut strength one grade form 3/5 to get up from the floor without assist of furniture  Pt will be  able to reach for pots/ objects bottom shelf without using the counter to pull him self up to standing  Improve TUG to 9 secs     Frequency / Duration: Patient will be seen for 1-2  x/week or a total of 8 visits over a 90 day period. Treatment will include: Neuromuscular Re-education, balance and core and LE strengthening.     Education or treatment limitation: None  Rehab Potential:good    FES Score  Score: 29.69 % (8/12/2024  8:02 PM)    Score and level of concern: 19 - low concern (8/12/2024  8:02 PM)      Patient/Family/Caregiver was advised of these findings, precautions, and treatment options and has agreed to actively participate in planning and for this course of care.    Thank you for your referral. Please co-sign or sign and return this letter via fax as soon as possible to [phone]. If you have any questions, please contact me at Dept: [phone]    Sincerely,  Electronically signed by therapist: Negra Vegas, PT  Physician's certification required: Yes  I certify the need for these services furnished under this plan of treatment and while under my care.    X___________________________________________________ Date____________________    Certification From: 8/13/2024  To:11/11/2024

## 2024-08-15 ENCOUNTER — OFFICE VISIT (OUTPATIENT)
Dept: PHYSICAL THERAPY | Age: 64
End: 2024-08-15
Attending: Other
Payer: COMMERCIAL

## 2024-08-15 DIAGNOSIS — R26.89 IMBALANCE: ICD-10-CM

## 2024-08-15 DIAGNOSIS — G35 MULTIPLE SCLEROSIS (HCC): Primary | ICD-10-CM

## 2024-08-15 DIAGNOSIS — R29.898 HAND WEAKNESS: ICD-10-CM

## 2024-08-15 DIAGNOSIS — R29.898 LEG WEAKNESS: ICD-10-CM

## 2024-08-15 PROCEDURE — 97110 THERAPEUTIC EXERCISES: CPT

## 2024-08-15 NOTE — PROGRESS NOTES
Diagnosis:    Multiple sclerosis (HCC) (G35)  Leg weakness (R29.898)  Hand weakness (R29.898)  Imbalance (R26.89)      Referring Provider: Diana Stephens  Date of Evaluation:    8/13/24    Precautions:  MS  Lumbar fusion        R hip replacement 2021 Next MD visit:   none scheduled  Date of Surgery: lumbar fusion 12/12/23    Insurance Primary/Secondary: [email or web address] / N/A     # Auth Visits: 8            Subjective:  not as sore from the eval. compliant with HEP     Pain: 0 /10      Objective:   Strength/MMT: (* denotes performed with pain)  Hip Knee Foot/Ankle   Flexion: R 3/5; L 3/5  Extension: R 3/5; L 3/5  Abduction: R 3/5; L 3/5  ER: R 3/5; L 3/5  IR: R 4/5; L 4/5 Flexion: R 5/5; L 5/5  Extension: R 5/5; L 5/5    DF: R 4/5; L 4/5  PF: R 4/5; L 4/5  INV: R 5/5; L 5/5  EV: R 4/5; L 4/5  Great toe ext: R NA/5; L NA/5     SL balance - 28 secs,   L side   R side  26 secs    Assessment:   pt  was fatigued today with balance exercises. Progressed to SL bridge .  R LE weaker then the L   R was the side that was experiencing all the radicular pain and symptoms prior to sx. No increase in symptoms today with any exercises.     Goals:   Pt will be I with HEP  Increase hip ER - pt weak, Passively he has full ROM- full AROM to get into figure 4 position, cross his legs  Pt will report walking his one mile without increase in hip tightness  Increase ankle strength for pt to take 3-4 steps toes/ heel walking without dropping no HHA  Increase SL balance to 20 secs B  - met 8/15/24  Increase glut strength one grade form 3/5 to get up from the floor without assist of furniture  Pt will be able to reach for pots/ objects bottom shelf without using the counter to pull him self up to standing  Improve TUG to 9 secs     Plan:  cont with PT for core, LE and hip strengthening. Balance training  Date: 8/15/2024  TX#: 2/8 Date:                 TX#: 3/ Date:                 TX#: 4/ Date:                 TX#: 5/ Date:   Tx#:  6/   Nustep 5 mins   Rocker board 10 x 2  Lateral side walking in bars   Front walking RTB  in bars   Rocker board 10 x 2  A/P holding in mid line   SL on foam pad 2# balance 10 x 2     Reformer B squats   SL only squats 10 x 2  Star 10 x each side   Squats in the bars 10 x   SL bridge 10 x each   SL balance in bars                               HEP:  see above , will mar, HS stretch     Charges:  EX 3       Total Timed Treatment: 45 min  Total Treatment Time: 45 min

## 2024-08-22 ENCOUNTER — OFFICE VISIT (OUTPATIENT)
Dept: PHYSICAL THERAPY | Age: 64
End: 2024-08-22
Attending: Other
Payer: COMMERCIAL

## 2024-08-22 PROCEDURE — 97110 THERAPEUTIC EXERCISES: CPT

## 2024-08-22 NOTE — PROGRESS NOTES
Diagnosis:    Multiple sclerosis (HCC) (G35)  Leg weakness (R29.898)  Hand weakness (R29.898)  Imbalance (R26.89)      Referring Provider: No ref. provider found  Date of Evaluation:    8/13/24    Precautions:  MS  Lumbar fusion        R hip replacement 2021 Next MD visit:   none scheduled  Date of Surgery: lumbar fusion 12/12/23    Insurance Primary/Secondary: [email or web address] / N/A     # Auth Visits: 8            Subjective: pt was away for the weekend. Did not get to all his exercises.     Pain: 0 /10      Objective:   Strength/MMT: (* denotes performed with pain)  Hip Knee Foot/Ankle   Flexion: R 3/5; L 3/5  Extension: R 3/5; L 3/5  Abduction: R 3/5; L 3/5  ER: R 3/5; L 3/5  IR: R 4/5; L 4/5 Flexion: R 5/5; L 5/5  Extension: R 5/5; L 5/5    DF: R 4/5; L 4/5  PF: R 4/5; L 4/5  INV: R 5/5; L 5/5  EV: R 4/5; L 4/5  Great toe ext: R NA/5; L NA/5     SL balance - 28 secs,   L side   R side  26 secs    Assessment:    pt could control his balance more with the R LE more in control. Pt did report having his shoe stick in the store, discussed lifting from his hip and heel striking to avoid any falling from catching. Cues for form. Pt did not have any discomfort in low back with exercises or following. Pt still requires PT for the R LE strength and balance    Goals:   Pt will be I with HEP  Increase hip ER - pt weak, Passively he has full ROM- full AROM to get into figure 4 position, cross his legs  Pt will report walking his one mile without increase in hip tightness  Increase ankle strength for pt to take 3-4 steps toes/ heel walking without dropping no HHA  Increase SL balance to 20 secs B  - met 8/15/24  Increase glut strength one grade form 3/5 to get up from the floor without assist of furniture  Pt will be able to reach for pots/ objects bottom shelf without using the counter to pull him self up to standing  Improve TUG to 9 secs     Plan:  cont with PT for core, LE and hip strengthening. Balance  training  Date: 8/15/2024  TX#: 2/8 Date:  8/22/24               TX#: 3/8 Date:                 TX#: 4/ Date:                 TX#: 5/ Date:   Tx#: 6/   Nustep 5 mins   Rocker board 10 x 2  Lateral side walking in bars   Front walking RTB  in bars   Rocker board 10 x 2  A/P holding in mid line   SL on foam pad 2# balance 10 x 2     Reformer B squats   SL only squats 10 x 2  Star 10 x each side   Squats in the bars 10 x   SL bridge 10 x each   SL balance in bars    Nustep 5 mins LE  Rocker board 10 x   SL rocker board 10 x   Red band lateral side walking  Front walking RTB   Ext RTB 10 x   1/2 foam pad - balance 10 sec   Ball side on foam pad 10 x   Side and anterior ball rolling   Bridge with hip abd 10 x  3 pulse   2 sets  SLR with pulse on way up and down 10 x each     HS stretch 2 x 30 secs B with strap                                  HEP:  see above , will mar, HS stretch     Charges:  EX 3       Total Timed Treatment: 45 min  Total Treatment Time: 45 min

## 2024-08-26 ENCOUNTER — OFFICE VISIT (OUTPATIENT)
Dept: PODIATRY CLINIC | Facility: CLINIC | Age: 64
End: 2024-08-26
Payer: COMMERCIAL

## 2024-08-26 DIAGNOSIS — M79.89 SWELLING OF TOE OF LEFT FOOT: ICD-10-CM

## 2024-08-26 DIAGNOSIS — S90.221A SUBUNGUAL HEMATOMA OF TOENAIL OF RIGHT FOOT, INITIAL ENCOUNTER: ICD-10-CM

## 2024-08-26 DIAGNOSIS — S72.002A CLOSED FRACTURE OF NECK OF LEFT FEMUR, INITIAL ENCOUNTER (HCC): Primary | ICD-10-CM

## 2024-08-26 PROCEDURE — 99213 OFFICE O/P EST LOW 20 MIN: CPT | Performed by: PODIATRIST

## 2024-08-26 NOTE — PROGRESS NOTES
Austyn Wynne is a 64 year old male.   Chief Complaint   Patient presents with    Follow - Up     Left foot 2nd toe- denies pain         HPI:   This gentleman presents to the clinic with a chief complaint of a swollen left second toe is not painful he has no history of injury but he is wondering if somehow he could have broken that he was recently on a trip to [place] they did a lot of walking and he also complains of a discolored nail on the third toe of the right foot.  At today's visit reviewed nurse's history as taken above, allergies medications and medical history as documented below.  All changes duly noted  Allergies: Patient has no known allergies.   Current Outpatient Medications   Medication Sig Dispense Refill    amLODIPine 10 MG Oral Tab Take 1 tablet (10 mg total) by mouth daily. 90 tablet 3    furosemide 20 MG Oral Tab Take 1 tablet (20 mg total) by mouth daily as needed. 10 tablet 0    losartan 100 MG Oral Tab Take 1 tablet (100 mg total) by mouth daily. 90 tablet 3    FENOFIBRATE 145 MG Oral Tab TAKE 1 TABLET BY MOUTH DAILY 90 tablet 3    PRAVASTATIN SODIUM 80 MG Oral Tab TAKE 1 TABLET BY MOUTH DAILY 90 tablet 3    docusate sodium 100 MG Oral Cap Take 100 mg by mouth 2 (two) times daily. 30 capsule 0    diphenhydrAMINE HCl (BENADRYL ALLERGY OR) Take 1 tablet by mouth one time. For Hibiclens.      methocarbamol 750 MG Oral Tab Take 1 tablet (750 mg total) by mouth 3 (three) times daily.      oxyCODONE-acetaminophen 5-325 MG Oral Tab Take 1-2 tablets by mouth every 4 to 6 hours.      tamsulosin 0.4 MG Oral Cap Take 1 capsule (0.4 mg total) by mouth daily.      traMADol 50 MG Oral Tab Take 1 tablet (50 mg total) by mouth every 8 (eight) hours as needed for Pain. 21 tablet 0    omega-3 fatty acids 1000 MG Oral Cap Take 1,000 mg by mouth As Directed.      Acidophilus/Pectin Oral Cap Take 1 capsule by mouth daily as needed.      Calcium Carbonate Antacid (TUMS OR) Take 1 tablet by mouth daily as  needed.      Omeprazole 40 MG Oral Capsule Delayed Release Take 1 capsule (40 mg total) by mouth daily. 30 capsule 0    folic acid 1 MG Oral Tab Take 1 tablet (1 mg total) by mouth daily.      Psyllium (METAMUCIL FIBER OR) Take 1 tablet by mouth daily.      Cholecalciferol (VITAMIN D) 2000 UNITS Oral Cap Take 1 capsule (2,000 Units total) by mouth daily.      Cyanocobalamin (VITAMIN B 12 OR) Take 1 tablet by mouth daily.        Teriflunomide 14 MG Oral Tab Take 14 mg by mouth daily. AUBAGIO         Past Medical History:    Calculus of kidney    Cancer (HCC)    colon    Carcinoid tumor (HCC)    cecal carcinoid    Chest pain    Colon polyps    Diverticulitis    left colon    Diverticulosis    Esophageal reflux    Fe deficiency anemia    Frequent urination    Believed to be side affect of MS    High blood pressure    High cholesterol    Multiple sclerosis (HCC)    Nephrolithiasis    Pain in joints    Hip pain...total hip replacement 6-2020    Pneumonia    Prediabetes    Diet control    Sleep apnea    Testicular lump    right    Visual impairment    glasses    Wears glasses    Glasses      Past Surgical History:   Procedure Laterality Date    Appendectomy  02/2011    Removed in Colon cancer surgury    Bowel resection      Cholecystectomy      Colectomy  2011 & 2019    To remove colon cancer    Colonoscopy  01/31/2012    Colonoscopy  01/2015    since colon cancer have 1 every 3 years    Ercp,diagnostic      Fracture surgery      Fracture of right 4th finger repaired - no metal    Hernia surgery  05/09/2019    Abd.    Hip replacement surgery      right hip replacement    Laparoscopic cholecystectomy  05/06/2023    Robotic cholecystectomy    Laparoscopy, surgical; colectomy, partial, w/ anastomos  02/2011    Other surgical history  02/05/2019    Laparoscopic-assisted small bowel resection with anastomosis    Other surgical history  03/08/2019    Repair of incarcerated incisional hernia    Tonsillectomy      Vasectomy  1990       Family History   Problem Relation Age of Onset    Diabetes Maternal Grandfather     Cancer Father         melanoma      Other (melanoma) Father         and pts uncle    Other (multiple sclerosis) Mother     Other (Other) Mother         MS    Other Mother         MS    Other (breast cancer) Maternal Grandmother     Other (heart attack) Paternal Grandmother 70      Social History     Socioeconomic History    Marital status:    Tobacco Use    Smoking status: Never    Smokeless tobacco: Never   Vaping Use    Vaping status: Never Used   Substance and Sexual Activity    Alcohol use: Yes     Alcohol/week: 2.0 - 3.0 standard drinks of alcohol     Types: 2 - 3 Cans of beer per week     Comment: 2 to 3 beers a week    Drug use: No   Other Topics Concern    Caffeine Concern Yes    Stress Concern No    Weight Concern Yes     Comment: about 15 to 20 pounds over weight    Special Diet No    Exercise Yes    Seat Belt Yes           REVIEW OF SYSTEMS:   Today reviewed systens as documented below  GENERAL HEALTH: feels well otherwise  SKIN: Refer to exam below  RESPIRATORY: denies shortness of breath with exertion  CARDIOVASCULAR: denies chest pain on exertion  GI: denies abdominal pain and denies heartburn  NEURO: denies headaches    EXAM:   There were no vitals taken for this visit.  GENERAL: well developed, well nourished, in no apparent distress  EXTREMITIES:   1. Integument: The skin on his feet is warm and dry turgor is decreased and the second toe is swollen mostly around the proximal inner phalangeal joint.,  Left foot.  The third toenail of the right foot shows subungual hematoma no loosening of the nail.   2. Vascular: Patient has palpable pulses bilateral   3. Neurologic: Patient has intact sensorium   4. Musculoskeletal: I could not produce any pain on palpation of the second toe.  X-rays were taken 3 views shows that he does have a hammertoe of the second digit could have been a little rubbing  irritation that caused the swelling.  No fracture noted.    ASSESSMENT AND PLAN:   Diagnoses and all orders for this visit:    Closed fracture of neck of left femur, initial encounter ([facility])    Swelling of toe of left foot    Subungual hematoma of toenail of right foot, initial encounter    Other orders  -     [facility] AMB POD XR - LT FOOT 3 VIEWS(AP,LAT,OBLIQUE) WT BEARING        Plan: Patient to keep it wrapped with 1 inch Coban for a week or so follow-up with me as needed I discussed the third toe on the right foot I told him that nail will probably fall off eventually but will take several months for new nail to grow.  Will just keep an eye on that follow-up as needed.    The patient indicates understanding of these issues and agrees to the plan.    Eric Rios DPM

## 2024-08-30 ENCOUNTER — APPOINTMENT (OUTPATIENT)
Dept: PHYSICAL THERAPY | Age: 64
End: 2024-08-30
Payer: COMMERCIAL

## 2024-09-04 ENCOUNTER — OFFICE VISIT (OUTPATIENT)
Dept: PHYSICAL THERAPY | Age: 64
End: 2024-09-04
Attending: Other
Payer: COMMERCIAL

## 2024-09-04 PROCEDURE — 97110 THERAPEUTIC EXERCISES: CPT

## 2024-09-04 NOTE — PROGRESS NOTES
Diagnosis:    Multiple sclerosis (HCC) (G35)  Leg weakness (R29.898)  Hand weakness (R29.898)  Imbalance (R26.89)      Referring Provider: No ref. provider found  Date of Evaluation:    8/13/24    Precautions:  MS  Lumbar fusion        R hip replacement 2021 Next MD visit:   none scheduled  Date of Surgery: lumbar fusion 12/12/23    Insurance Primary/Secondary: [employer] / N/A     # Auth Visits: 8            Subjective:  sore after SLR with ER after the last appt.   Today no pain.     Pain: 0 /10      Objective:   Strength/MMT: (* denotes performed with pain)  Hip Knee Foot/Ankle   Flexion: R 3/5; L 3/5  Extension: R 3/5; L 3/5  Abduction: R 3/5; L 3/5  ER: R 3/5; L 3/5  IR: R 4/5; L 4/5 Flexion: R 5/5; L 5/5  Extension: R 5/5; L 5/5    DF: R 4/5; L 4/5  PF: R 4/5; L 4/5  INV: R 5/5; L 5/5  EV: R 4/5; L 4/5  Great toe ext: R NA/5; L NA/5     SL balance - 28 secs,   L side   R side  26 secs    Assessment:     loss of balance with exercises tapping today.  Had to tap down between movement. Slowed down the bridges today-  no pain throughout the session today. Manual cues for bird dog today B   Goals:   Pt will be I with HEP  Increase hip ER - pt weak, Passively he has full ROM- full AROM to get into figure 4 position, cross his legs  Pt will report walking his one mile without increase in hip tightness  Increase ankle strength for pt to take 3-4 steps toes/ heel walking without dropping no HHA  Increase SL balance to 20 secs B  - met 8/15/24  Increase glut strength one grade form 3/5 to get up from the floor without assist of furniture  Pt will be able to reach for pots/ objects bottom shelf without using the counter to pull him self up to standing  Improve TUG to 9 secs     Plan:  cont with PT for core, LE and hip strengthening. Balance training  Date: 8/15/2024  TX#: 2/8 Date:  8/22/24               TX#: 3/8 Date:   9/4/24            TX#: 4/8 Date:                 TX#: 5/ Date:   Tx#: 6/   Robert 5 mins    Rocker board 10 x 2  Lateral side walking in bars   Front walking RTB  in bars   Rocker board 10 x 2  A/P holding in mid line   SL on foam pad 2# balance 10 x 2     Reformer B squats   SL only squats 10 x 2  Star 10 x each side   Squats in the bars 10 x   SL bridge 10 x each   SL balance in bars    Nustep 5 mins LE  Rocker board 10 x   SL rocker board 10 x   Red band lateral side walking  Front walking RTB   Ext RTB 10 x   1/2 foam pad - balance 10 sec   Ball side on foam pad 10 x   Side and anterior ball rolling   Bridge with hip abd 10 x  3 pulse   2 sets  SLR with pulse on way up and down 10 x each     HS stretch 2 x 30 secs B with strap        Nustep 5 mins LE only   Color taps SL only  10 x 2   Reformer 5 cords 12 x B squats   SL squats 4 cords 12 x each   1/2 tandem on foam   10 x sec 10   Side walking / beam   SL ball roll on foam pad 10x each side   Bird dog 2 sec holds 10 x   HS/ piriformis 2 x 30 secs   Bridge 10 x                           HEP:  see above , will mar, HS stretch     Charges:  EX 3       Total Timed Treatment: 45 min  Total Treatment Time: 45 min

## 2024-09-06 ENCOUNTER — OFFICE VISIT (OUTPATIENT)
Dept: PHYSICAL THERAPY | Age: 64
End: 2024-09-06
Attending: Other
Payer: COMMERCIAL

## 2024-09-06 PROCEDURE — 97110 THERAPEUTIC EXERCISES: CPT

## 2024-09-06 NOTE — PROGRESS NOTES
Diagnosis:    Multiple sclerosis (HCC) (G35)  Leg weakness (R29.898)  Hand weakness (R29.898)  Imbalance (R26.89)      Referring Provider: No ref. provider found  Date of Evaluation:    8/13/24    Precautions:  MS  Lumbar fusion        R hip replacement 2021 Next MD visit:   none scheduled  Date of Surgery: lumbar fusion 12/12/23    Insurance Primary/Secondary: [employer] / N/A     # Auth Visits: 8            Subjective:  R groin is feeling like I worked it out.   Pain: 0 /10 low back        Objective:   Strength/MMT: (* denotes performed with pain)  Hip Knee Foot/Ankle   Flexion: R 3/5; L 3/5  Extension: R 3/5; L 3/5  Abduction: R 3/5; L 3/5  ER: R 3/5; L 3/5  IR: R 4/5; L 4/5 Flexion: R 5/5; L 5/5  Extension: R 5/5; L 5/5    DF: R 4/5; L 4/5  PF: R 4/5; L 4/5  INV: R 5/5; L 5/5  EV: R 4/5; L 4/5  Great toe ext: R NA/5; L NA/5     SL balance - 28 secs,   L side   R side  26 secs    Assessment:   R side still weaker then the L with balance and strengthening exercises. Cues for form today- fatigued with side lying abd and rainbows today on the R.   Goals:   Pt will be I with HEP  Increase hip ER - pt weak, Passively he has full ROM- full AROM to get into figure 4 position, cross his legs  Pt will report walking his one mile without increase in hip tightness  Increase ankle strength for pt to take 3-4 steps toes/ heel walking without dropping no HHA  Increase SL balance to 20 secs B  - met 8/15/24  Increase glut strength one grade form 3/5 to get up from the floor without assist of furniture  Pt will be able to reach for pots/ objects bottom shelf without using the counter to pull him self up to standing  Improve TUG to 9 secs     Plan:  cont with PT for core, LE and hip strengthening. Balance training- re assess balance and strength next appt   Date: 8/15/2024  TX#: 2/8 Date:  8/22/24               TX#: 3/8 Date:    9/6/24           TX#: 4/8 Date:                 TX#: 5/ Date:   Tx#: 6/   Nustep 5 mins    Rocker board 10 x 2  Lateral side walking in bars   Front walking RTB  in bars   Rocker board 10 x 2  A/P holding in mid line   SL on foam pad 2# balance 10 x 2     Reformer B squats   SL only squats 10 x 2  Star 10 x each side   Squats in the bars 10 x   SL bridge 10 x each   SL balance in bars    Nustep 5 mins LE  Rocker board 10 x   SL rocker board 10 x   Red band lateral side walking  Front walking RTB   Ext RTB 10 x   1/2 foam pad - balance 10 sec   Ball side on foam pad 10 x   Side and anterior ball rolling   Bridge with hip abd 10 x  3 pulse   2 sets  SLR with pulse on way up and down 10 x each     HS stretch 2 x 30 secs B with strap        Nustep 5 mins LE  Lateral side walking GTB   Front walking GTB   Hip ext 10 x GTB  2 sets  Abiola disc 10 x each side ball roll   Taps SL 10 x   Chair taps 10 x each side   SLR 1/2 up and 1/2 down 10 x each   Bridge 10 x 1/2 up and down  SL abd/ rainbow 10 x   RTB   HS with belt 30 secs x 2 each side    Piriformis stretch 2 x 30 secs B                               HEP:  see above , will mar, HS stretch     Charges:  EX 3       Total Timed Treatment: 45 min  Total Treatment Time: 45 min

## 2024-09-10 ENCOUNTER — MED REC SCAN ONLY (OUTPATIENT)
Dept: INTERNAL MEDICINE CLINIC | Facility: CLINIC | Age: 64
End: 2024-09-10

## 2024-09-12 ENCOUNTER — OFFICE VISIT (OUTPATIENT)
Dept: PHYSICAL THERAPY | Age: 64
End: 2024-09-12
Attending: Other
Payer: COMMERCIAL

## 2024-09-12 PROCEDURE — 97110 THERAPEUTIC EXERCISES: CPT

## 2024-09-12 NOTE — PROGRESS NOTES
Diagnosis:    Multiple sclerosis (HCC) (G35)  Leg weakness (R29.898)  Hand weakness (R29.898)  Imbalance (R26.89)      Referring Provider: No ref. provider found  Date of Evaluation:    8/13/24    Precautions:  MS  Lumbar fusion        R hip replacement 2021 Next MD visit:   none scheduled  Date of Surgery: lumbar fusion 12/12/23    Insurance Primary/Secondary: [employer] / N/A     # Auth Visits: 8            Subjective:   R groin is feeling better. Was sore after the last appt for 1-2 days, from working out.   Pain: 0 /10 low back        Objective:   Strength/MMT: (* denotes performed with pain)  Hip Knee Foot/Ankle   Flexion: R 3/5; L 3/5  Extension: R 3/5; L 3/5  Abduction: R 3/5; L 3/5  ER: R 3/5; L 3/5  IR: R 4/5; L 4/5 Flexion: R 5/5; L 5/5  Extension: R 5/5; L 5/5    DF: R 4/5; L 4/5  PF: R 4/5; L 4/5  INV: R 5/5; L 5/5  EV: R 4/5; L 4/5  Great toe ext: R NA/5; L NA/5     SL balance - 28 secs,   L side   R side  26 secs    Assessment:    cues to maintain the quad contraction with hip and and rainbows. Pt has been better about not catching his toes on stairs at home, has not occurred for over 2 weeks. Cues to not hold his breathe.  R hip flexor still slightly weaker. HS still tight B   Goals:   Pt will be I with HEP  Increase hip ER - pt weak, Passively he has full ROM- full AROM to get into figure 4 position, cross his legs  Pt will report walking his one mile without increase in hip tightness  Increase ankle strength for pt to take 3-4 steps toes/ heel walking without dropping no HHA  Increase SL balance to 20 secs B  - met 8/15/24  Increase glut strength one grade form 3/5 to get up from the floor without assist of furniture  Pt will be able to reach for pots/ objects bottom shelf without using the counter to pull him self up to standing  Improve TUG to 9 secs     Plan:  cont with PT for core, LE and hip strengthening. Balance training- re assess balance and strength next appt   Date:  8/15/2024  TX#: 2/8 Date:  8/22/24               TX#: 3/8 Date:    9/6/24           TX#: 4/8 Date:  9/12/24          TX#: 5/8 Date:   Tx#: 6/   Nustep 5 mins   Rocker board 10 x 2  Lateral side walking in bars   Front walking RTB  in bars   Rocker board 10 x 2  A/P holding in mid line   SL on foam pad 2# balance 10 x 2     Reformer B squats   SL only squats 10 x 2  Star 10 x each side   Squats in the bars 10 x   SL bridge 10 x each   SL balance in bars    Nustep 5 mins LE  Rocker board 10 x   SL rocker board 10 x   Red band lateral side walking  Front walking RTB   Ext RTB 10 x   1/2 foam pad - balance 10 sec   Ball side on foam pad 10 x   Side and anterior ball rolling   Bridge with hip abd 10 x  3 pulse   2 sets  SLR with pulse on way up and down 10 x each     HS stretch 2 x 30 secs B with strap        Nustep 5 mins LE  Lateral side walking GTB   Front walking GTB   Hip ext 10 x GTB  2 sets  Abiola disc 10 x each side ball roll   Taps SL 10 x   Chair taps 10 x each side   SLR 1/2 up and 1/2 down 10 x each   Bridge 10 x 1/2 up and down  SL abd/ rainbow 10 x   RTB   HS with belt 30 secs x 2 each side    Piriformis stretch 2 x 30 secs B      Nustep 5 mins   Lateral side walking  Front walking GTB     Hips ext 10 x 2   Bosu step ups 10 x each side  Side stepping bosu 10 x   Bosu 10 x bridge  Bridge with marching 10 x off bosu   Side lying rainbow RTB 10 x each side    10 x abd hip RTB   Over hurdles step over lateral and forward 10 x   Taps on hurdles with CGA 10 x 2                          HEP:  see above , clams , bridges, HS stretch     Charges:  EX 3       Total Timed Treatment: 45 min  Total Treatment Time: 45 min

## 2024-09-18 ENCOUNTER — OFFICE VISIT (OUTPATIENT)
Dept: PHYSICAL THERAPY | Age: 64
End: 2024-09-18
Attending: Other
Payer: COMMERCIAL

## 2024-09-18 PROCEDURE — 97110 THERAPEUTIC EXERCISES: CPT

## 2024-09-18 NOTE — PROGRESS NOTES
Diagnosis:    Multiple sclerosis (HCC) (G35)  Leg weakness (R29.898)  Hand weakness (R29.898)  Imbalance (R26.89)      Referring Provider: No ref. provider found  Date of Evaluation:    8/13/24    Precautions:  MS  Lumbar fusion        R hip replacement 2021 Next MD visit:   none scheduled  Date of Surgery: lumbar fusion 12/12/23    Insurance Primary/Secondary: [employer] / N/A     # Auth Visits: 8            Subjective:   back from moving the inlaws back from their summer home. No falls or any symptoms at this time.   Pain: 0 /10 low back        Objective:   Strength/MMT: (* denotes performed with pain)  Hip Knee Foot/Ankle   Flexion: R 3/5; L 3/5  Extension: R 3/5; L 3/5  Abduction: R 3/5; L 3/5  ER: R 3/5; L 3/5  IR: R 4/5; L 4/5 Flexion: R 5/5; L 5/5  Extension: R 5/5; L 5/5    DF: R 4/5; L 4/5  PF: R 4/5; L 4/5  INV: R 5/5; L 5/5  EV: R 4/5; L 4/5  Great toe ext: R NA/5; L NA/5     SL balance - 28 secs,   L side   R side  26 secs    Assessment:   pt improving overall , still some weakness with balance on the R in tandem. Can demonstrate SL bridge and progress to marching with bridge today with improve hip stability.      Goals:   Pt will be I with HEP  Increase hip ER - pt weak, Passively he has full ROM- full AROM to get into figure 4 position, cross his legs  Pt will report walking his one mile without increase in hip tightness  Increase ankle strength for pt to take 3-4 steps toes/ heel walking without dropping no HHA  Increase SL balance to 20 secs B  - met 8/15/24  Increase glut strength one grade form 3/5 to get up from the floor without assist of furniture  Pt will be able to reach for pots/ objects bottom shelf without using the counter to pull him self up to standing  Improve TUG to 9 secs     Plan:  cont with PT for core, LE and hip strengthening. Balance training- re assess balance and strength next appt   Date: 8/15/2024  TX#: 2/8 Date:  8/22/24               TX#: 3/8 Date:    9/6/24            TX#: 4/8 Date:  9/12/24          TX#: 5/8 Date:9/18/24  Tx#: 6/8   Nustep 5 mins   Rocker board 10 x 2  Lateral side walking in bars   Front walking RTB  in bars   Rocker board 10 x 2  A/P holding in mid line   SL on foam pad 2# balance 10 x 2     Reformer B squats   SL only squats 10 x 2  Star 10 x each side   Squats in the bars 10 x   SL bridge 10 x each   SL balance in bars    Nustep 5 mins LE  Rocker board 10 x   SL rocker board 10 x   Red band lateral side walking  Front walking RTB   Ext RTB 10 x   1/2 foam pad - balance 10 sec   Ball side on foam pad 10 x   Side and anterior ball rolling   Bridge with hip abd 10 x  3 pulse   2 sets  SLR with pulse on way up and down 10 x each     HS stretch 2 x 30 secs B with strap        Nustep 5 mins LE  Lateral side walking GTB   Front walking GTB   Hip ext 10 x GTB  2 sets  Abiola disc 10 x each side ball roll   Taps SL 10 x   Chair taps 10 x each side   SLR 1/2 up and 1/2 down 10 x each   Bridge 10 x 1/2 up and down  SL abd/ rainbow 10 x   RTB   HS with belt 30 secs x 2 each side    Piriformis stretch 2 x 30 secs B      Nustep 5 mins   Lateral side walking  Front walking GTB     Hips ext 10 x 2   Bosu step ups 10 x each side  Side stepping bosu 10 x   Bosu 10 x bridge  Bridge with marching 10 x off bosu   Side lying rainbow RTB 10 x each side    10 x abd hip RTB   Over hurdles step over lateral and forward 10 x   Taps on hurdles with CGA 10 x 2  Nustep 5 mins LE only   Reformer 5 cords 10 x  2  SL disc circles with GTB 10 x each side  Lateral and front walking  1/2 tandem on foam 10 secs x 3   Bridge with marching 10 x 2   SL bridge 10 x each side  SKTC 2 x 20 secs each side  LTR 10 x   Gastroc stretch 2 x 30 secs B                             HEP:  see above , clams , bridges, HS stretch     Charges:  EX 3       Total Timed Treatment: 45 min  Total Treatment Time: 45 min

## 2024-09-20 ENCOUNTER — OFFICE VISIT (OUTPATIENT)
Dept: PHYSICAL THERAPY | Age: 64
End: 2024-09-20
Attending: Other
Payer: COMMERCIAL

## 2024-09-20 PROCEDURE — 97110 THERAPEUTIC EXERCISES: CPT

## 2024-09-20 NOTE — PROGRESS NOTES
Diagnosis:    Multiple sclerosis (HCC) (G35)  Leg weakness (R29.898)  Hand weakness (R29.898)  Imbalance (R26.89)      Referring Provider: No ref. provider found  Date of Evaluation:    8/13/24    Precautions:  MS  Lumbar fusion        R hip replacement 2021 Next MD visit:   none scheduled  Date of Surgery: lumbar fusion 12/12/23    Insurance Primary/Secondary: [email or web address] / N/A     # Auth Visits: 8            Subjective:   back from moving the inlaws back from their summer home. No falls or any symptoms at this time.   Pain: 0 /10 low back        Objective:   Strength/MMT: (* denotes performed with pain)  Hip Knee Foot/Ankle   Flexion: R 3/5; L 3/5  Extension: R 3/5; L 3/5  Abduction: R 3/5; L 3/5  ER: R 3/5; L 3/5  IR: R 4/5; L 4/5 Flexion: R 5/5; L 5/5  Extension: R 5/5; L 5/5    DF: R 4/5; L 4/5  PF: R 4/5; L 4/5  INV: R 5/5; L 5/5  EV: R 4/5; L 4/5  Great toe ext: R NA/5; L NA/5     SL balance - 28 secs,   L side   R side  26 secs  TUG 8 seconds today     Assessment:   .  Pt did have some increased difficulty with eccentric lowering on the R side today. Pt can hold the bridge with marching at this time with less loss of hip stability.      Goals:   Pt will be I with HEP  Increase hip ER - pt weak, Passively he has full ROM- full AROM to get into figure 4 position, cross his legs  Pt will report walking his one mile without increase in hip tightness  Increase ankle strength for pt to take 3-4 steps toes/ heel walking without dropping no HHA  Increase SL balance to 20 secs B  - met 8/15/24  Increase glut strength one grade form 3/5 to get up from the floor without assist of furniture  Pt will be able to reach for pots/ objects bottom shelf without using the counter to pull him self up to standing  Improve TUG to 9 secs - met 8 seconds 9/20/24    Plan:  cont with PT for core, LE and hip strengthening. Balance training- re assess balance and strength next appt   Date: 8/15/2024  TX#: 2/8 Date:  8/22/24                TX#: 3/8 Date:    9/6/24           TX#: 4/8 Date:  9/12/24          TX#: 5/8 Date:9/18/24  Tx#: 6/8 7/8 9/20/24   Nustep 5 mins   Rocker board 10 x 2  Lateral side walking in bars   Front walking RTB  in bars   Rocker board 10 x 2  A/P holding in mid line   SL on foam pad 2# balance 10 x 2     Reformer B squats   SL only squats 10 x 2  Star 10 x each side   Squats in the bars 10 x   SL bridge 10 x each   SL balance in bars    Nustep 5 mins LE  Rocker board 10 x   SL rocker board 10 x   Red band lateral side walking  Front walking RTB   Ext RTB 10 x   1/2 foam pad - balance 10 sec   Ball side on foam pad 10 x   Side and anterior ball rolling   Bridge with hip abd 10 x  3 pulse   2 sets  SLR with pulse on way up and down 10 x each     HS stretch 2 x 30 secs B with strap        Nustep 5 mins LE  Lateral side walking GTB   Front walking GTB   Hip ext 10 x GTB  2 sets  Abiola disc 10 x each side ball roll   Taps SL 10 x   Chair taps 10 x each side   SLR 1/2 up and 1/2 down 10 x each   Bridge 10 x 1/2 up and down  SL abd/ rainbow 10 x   RTB   HS with belt 30 secs x 2 each side    Piriformis stretch 2 x 30 secs B      Nustep 5 mins   Lateral side walking  Front walking GTB     Hips ext 10 x 2   Bosu step ups 10 x each side  Side stepping bosu 10 x   Bosu 10 x bridge  Bridge with marching 10 x off bosu   Side lying rainbow RTB 10 x each side    10 x abd hip RTB   Over hurdles step over lateral and forward 10 x   Taps on hurdles with CGA 10 x 2  Nustep 5 mins LE only   Reformer 5 cords 10 x  2  SL disc circles with GTB 10 x each side  Lateral and front walking  1/2 tandem on foam 10 secs x 3   Bridge with marching 10 x 2   SL bridge 10 x each side  SKTC 2 x 20 secs each side  LTR 10 x   Gastroc stretch 2 x 30 secs B      Nusteo 5 mins LE only   Foam roller rows 10 x 2 GTB   Bosu. Foam pad 10 x seconds x 3   Tandem foam pad 10 secs x 2   6 inch eccentric taps 10 x each side  Blue band clams, side lying abd and  rainbows 10 x each B  HS stretch 2 x 30 secs    Marching in bridge 10 x 2  LTR 10 x   Stephen stretch 2 x 30 secs    Off plinth                                HEP:  see above , will mar, HS stretch     Charges:  EX 3       Total Timed Treatment: 45 min  Total Treatment Time: 45 min

## 2024-09-25 ENCOUNTER — OFFICE VISIT (OUTPATIENT)
Dept: PHYSICAL THERAPY | Age: 64
End: 2024-09-25
Attending: Other
Payer: COMMERCIAL

## 2024-09-25 PROCEDURE — 97110 THERAPEUTIC EXERCISES: CPT

## 2024-09-25 NOTE — PROGRESS NOTES
Diagnosis:    Multiple sclerosis (HCC) (G35)  Leg weakness (R29.898)  Hand weakness (R29.898)  Imbalance (R26.89)      Referring Provider: No ref. provider found  Date of Evaluation:    8/13/24    Precautions:  MS  Lumbar fusion        R hip replacement 2021 Next MD visit:   none scheduled  Date of Surgery: lumbar fusion 12/12/23    Insurance Primary/Secondary: [employer] / N/A     # Auth Visits: 8           Progress Summary  Pt has attended 8 visits in Physical Therapy.      Subjective:  pt reports falling at his son's , not sure how he got on the floor, did not black out, does not think his leg buckled    Pain: 0 /10 low back        Objective:   Strength/MMT: (* denotes performed with pain)  Hip Knee Foot/Ankle   Flexion: R 3/5; L 3/5  Extension: R 3/5; L 3/5  Abduction: R 3/5; L 3/5  ER: R 3/5; L 3/5  IR: R 4/5; L 4/5 Flexion: R 5/5; L 5/5  Extension: R 5/5; L 5/5    DF: R 4/5; L 4/5  PF: R 4/5; L 4/5  INV: R 5/5; L 5/5  EV: R 4/5; L 4/5  Great toe ext: R NA/5; L NA/5     SL balance - 28 secs,   L side   R side  26 secs  TUG 8 seconds today     Assessment:   .  Less difficulty with L side stabilizing today in bars. Progressed to quad position today - cues for form. Fatigue on the R side today - improved overall     Goals:   Pt will be I with HEP  Increase hip ER - pt weak, Passively he has full ROM- full AROM to get into figure 4 position, cross his legs  Pt will report walking his one mile without increase in hip tightness  Increase ankle strength for pt to take 3-4 steps toes/ heel walking without dropping no HHA  Increase SL balance to 20 secs B  - met 8/15/24  Increase glut strength one grade form 3/5 to get up from the floor without assist of furniture  Pt will be able to reach for pots/ objects bottom shelf without using the counter to pull him self up to standing  Improve TUG to 9 secs - met 8 seconds 9/20/24    Plan:  cont with PT for core, LE and hip strengthening. Balance training- re assess  balance and strength next appt   Please sign to cont with PT for LE and core strengthening. 1 x a week for a few more visits.   Date: 8/15/2024  TX#: 2/8 Date:  8/22/24               TX#: 3/8 Date:    9/6/24           TX#: 4/8 Date:  9/12/24          TX#: 5/8 Date:9/18/24  Tx#: 6/8 7/8 9/20/24 8/8 9/25/24   Nustep 5 mins   Rocker board 10 x 2  Lateral side walking in bars   Front walking RTB  in bars   Rocker board 10 x 2  A/P holding in mid line   SL on foam pad 2# balance 10 x 2     Reformer B squats   SL only squats 10 x 2  Star 10 x each side   Squats in the bars 10 x   SL bridge 10 x each   SL balance in bars    Nustep 5 mins LE  Rocker board 10 x   SL rocker board 10 x   Red band lateral side walking  Front walking RTB   Ext RTB 10 x   1/2 foam pad - balance 10 sec   Ball side on foam pad 10 x   Side and anterior ball rolling   Bridge with hip abd 10 x  3 pulse   2 sets  SLR with pulse on way up and down 10 x each     HS stretch 2 x 30 secs B with strap        Nustep 5 mins LE  Lateral side walking GTB   Front walking GTB   Hip ext 10 x GTB  2 sets  Abiola disc 10 x each side ball roll   Taps SL 10 x   Chair taps 10 x each side   SLR 1/2 up and 1/2 down 10 x each   Bridge 10 x 1/2 up and down  SL abd/ rainbow 10 x   RTB   HS with belt 30 secs x 2 each side    Piriformis stretch 2 x 30 secs B      Nustep 5 mins   Lateral side walking  Front walking GTB     Hips ext 10 x 2   Bosu step ups 10 x each side  Side stepping bosu 10 x   Bosu 10 x bridge  Bridge with marching 10 x off bosu   Side lying rainbow RTB 10 x each side    10 x abd hip RTB   Over hurdles step over lateral and forward 10 x   Taps on hurdles with CGA 10 x 2  Nustep 5 mins LE only   Reformer 5 cords 10 x  2  SL disc circles with GTB 10 x each side  Lateral and front walking  1/2 tandem on foam 10 secs x 3   Bridge with marching 10 x 2   SL bridge 10 x each side  SKTC 2 x 20 secs each side  LTR 10 x   Gastroc stretch 2 x 30 secs B      Nusteo 5  mins LE only   Foam roller rows 10 x 2 GTB   Bosu. Foam pad 10 x seconds x 3   Tandem foam pad 10 secs x 2   6 inch eccentric taps 10 x each side  Blue band clams, side lying abd and rainbows 10 x each B  HS stretch 2 x 30 secs    Marching in bridge 10 x 2  LTR 10 x   Stephen stretch 2 x 30 secs    Off plinth      Elliptical 2 mins in each directions   Step ups 10 on each side bosu   Cone taps I nbars no HHA B 10 x   Lunge side ways 10 x 2   Reformer 10 x 2 4 cords squats SL   SL ball toss in bars blue ball 10 x  Bridge marching 6 x 4  Side lying abd/ clams   Bird dog 10 x   Donkey kick 10 x gTB   Fire hydrants GTB 10 x 2                                 HEP:  see above , will mar, HS stretch     Charges:  EX 3       Total Timed Treatment: 45 min  Total Treatment Time: 45 min

## 2024-09-27 ENCOUNTER — OFFICE VISIT (OUTPATIENT)
Dept: PHYSICAL THERAPY | Age: 64
End: 2024-09-27
Attending: Other
Payer: COMMERCIAL

## 2024-09-27 PROCEDURE — 97110 THERAPEUTIC EXERCISES: CPT

## 2024-09-27 NOTE — PROGRESS NOTES
Diagnosis:    Multiple sclerosis (HCC) (G35)  Leg weakness (R29.898)  Hand weakness (R29.898)  Imbalance (R26.89)      Referring Provider: No ref. provider found  Date of Evaluation:    8/13/24    Precautions:  MS  Lumbar fusion        R hip replacement 2021 Next MD visit:   none scheduled  Date of Surgery: lumbar fusion 12/12/23    Insurance Primary/Secondary: [employer] / N/A     # Auth Visits: 8              Subjective:  not sore after the last appt. Since the last appt did not lift the R entirely and tripped and hit the door frame.     Pain: 0 /10 low back        Objective:   Strength/MMT: (* denotes performed with pain)  Hip Knee Foot/Ankle   Flexion: R 3/5; L 3/5  Extension: R 3/5; L 3/5  Abduction: R 3/5; L 3/5  ER: R 3/5; L 3/5  IR: R 4/5; L 4/5 Flexion: R 5/5; L 5/5  Extension: R 5/5; L 5/5    DF: R 4/5; L 4/5  PF: R 4/5; L 4/5  INV: R 5/5; L 5/5  EV: R 4/5; L 4/5  Great toe ext: R NA/5; L NA/5     SL balance - 28 secs,   L side   R side  26 secs  TUG 8 seconds today     Assessment:   pt still feeling lack of stability on the R- continued work on hip flexor on the R and cues to  the R hip with stairs and walking. Pt has tendency to catch his toes with stairs due to R hip weakness in the flexors.     Goals:   Pt will be I with HEP  Increase hip ER - pt weak, Passively he has full ROM- full AROM to get into figure 4 position, cross his legs  Pt will report walking his one mile without increase in hip tightness  Increase ankle strength for pt to take 3-4 steps toes/ heel walking without dropping no HHA  Increase SL balance to 20 secs B  - met 8/15/24  Increase glut strength one grade form 3/5 to get up from the floor without assist of furniture- depends 9/27/24  Pt will be able to reach for pots/ objects bottom shelf without using the counter to pull him self up to standing  Improve TUG to 9 secs - met 8 seconds 9/20/24    Plan:  cont with PT for core, LE and hip strengthening. Balance  training- re assess balance and strength next appt   Please sign to cont with PT for LE and core strengthening. 1 x a week for a few more visits.   Date: 8/15/2024  TX#: 2/8 Date:  8/22/24               TX#: 3/8 Date:    9/6/24           TX#: 4/8 Date:  9/12/24          TX#: 5/8 Date:9/18/24  Tx#: 6/8 7/8 9/20/24 8/8 9/25/24 1/4 9/27/24   Nustep 5 mins   Rocker board 10 x 2  Lateral side walking in bars   Front walking RTB  in bars   Rocker board 10 x 2  A/P holding in mid line   SL on foam pad 2# balance 10 x 2     Reformer B squats   SL only squats 10 x 2  Star 10 x each side   Squats in the bars 10 x   SL bridge 10 x each   SL balance in bars    Nustep 5 mins LE  Rocker board 10 x   SL rocker board 10 x   Red band lateral side walking  Front walking RTB   Ext RTB 10 x   1/2 foam pad - balance 10 sec   Ball side on foam pad 10 x   Side and anterior ball rolling   Bridge with hip abd 10 x  3 pulse   2 sets  SLR with pulse on way up and down 10 x each     HS stretch 2 x 30 secs B with strap        Nustep 5 mins LE  Lateral side walking GTB   Front walking GTB   Hip ext 10 x GTB  2 sets  Abiola disc 10 x each side ball roll   Taps SL 10 x   Chair taps 10 x each side   SLR 1/2 up and 1/2 down 10 x each   Bridge 10 x 1/2 up and down  SL abd/ rainbow 10 x   RTB   HS with belt 30 secs x 2 each side    Piriformis stretch 2 x 30 secs B      Nustep 5 mins   Lateral side walking  Front walking GTB     Hips ext 10 x 2   Bosu step ups 10 x each side  Side stepping bosu 10 x   Bosu 10 x bridge  Bridge with marching 10 x off bosu   Side lying rainbow RTB 10 x each side    10 x abd hip RTB   Over hurdles step over lateral and forward 10 x   Taps on hurdles with CGA 10 x 2  Nustep 5 mins LE only   Reformer 5 cords 10 x  2  SL disc circles with GTB 10 x each side  Lateral and front walking  1/2 tandem on foam 10 secs x 3   Bridge with marching 10 x 2   SL bridge 10 x each side  SKTC 2 x 20 secs each side  LTR 10 x   Gastroc  stretch 2 x 30 secs B      Nusteo 5 mins LE only   Foam roller rows 10 x 2 GTB   Bosu. Foam pad 10 x seconds x 3   Tandem foam pad 10 secs x 2   6 inch eccentric taps 10 x each side  Blue band clams, side lying abd and rainbows 10 x each B  HS stretch 2 x 30 secs    Marching in bridge 10 x 2  LTR 10 x   Stephen stretch 2 x 30 secs    Off plinth      Elliptical 2 mins in each directions   Step ups 10 on each side bosu   Cone taps I nbars no HHA B 10 x   Lunge side ways 10 x 2   Reformer 10 x 2 4 cords squats SL   SL ball toss in bars blue ball 10 x  Bridge marching 6 x 4  Side lying abd/ clams   Bird dog 10 x   Donkey kick 10 x gTB   Fire hydrants GTB 10 x 2    Nustep 4 mins LE   Green band around the feet - hip flexor on the wall 10 x B   10 x R GTB   Taps on cones R 10 x   GTB abd 5 x  in bars 2 reps with moving abduction  Bridge 10 x   Side lying R abd /clams GTB 2 sets  Piriformis B stretch   HS stretch 2 x 30 secs B   Roller R quad/ hip flexor R only 4 mins                                        HEP:  see above , zaid , bridges, HS stretch     Charges:  EX 3       Total Timed Treatment: 45 min  Total Treatment Time: 45 min

## 2024-10-01 ENCOUNTER — OFFICE VISIT (OUTPATIENT)
Dept: PHYSICAL THERAPY | Age: 64
End: 2024-10-01
Attending: Other
Payer: COMMERCIAL

## 2024-10-01 PROCEDURE — 97110 THERAPEUTIC EXERCISES: CPT

## 2024-10-01 NOTE — PROGRESS NOTES
Diagnosis:    Multiple sclerosis (HCC) (G35)  Leg weakness (R29.898)  Hand weakness (R29.898)  Imbalance (R26.89)      Referring Provider: No ref. provider found  Date of Evaluation:    8/13/24    Precautions:  MS  Lumbar fusion        R hip replacement 2021 Next MD visit:   none scheduled  Date of Surgery: lumbar fusion 12/12/23    Insurance Primary/Secondary: [employer] / N/A     # Auth Visits: 8              Subjective:  I am compliant with HEP.  Up on ladder cutting branches - sore today.   Pain: 0 /10 low back        Objective:   Strength/MMT: (* denotes performed with pain)  Hip Knee Foot/Ankle   Flexion: R 3/5; L 3/5  Extension: R 3/5; L 3/5  Abduction: R 3/5; L 3/5  ER: R 3/5; L 3/5  IR: R 4/5; L 4/5 Flexion: R 5/5; L 5/5  Extension: R 5/5; L 5/5    DF: R 4/5; L 4/5  PF: R 4/5; L 4/5  INV: R 5/5; L 5/5  EV: R 4/5; L 4/5  Great toe ext: R NA/5; L NA/5     SL balance - 28 secs,   L side   R side  26 secs  TUG 8 seconds today     Assessment:   review of HEP today and what to continue with at home. R LE still slightly weaker at this time when compared to the L. Pt still requires cues to lift at his hip to avoid dragging/ catching toes.  Improved balance B . R hip still tightens with walking at times.   Cues to maintain knee with side lying hip abd exercise.     Goals:   Pt will be I with HEP  Increase hip ER - pt weak, Passively he has full ROM- full AROM to get into figure 4 position, cross his legs  Pt will report walking his one mile without increase in hip tightness-  can still occur towards end of  the Mile on the R or walking to his corner.  10 /1/24  Increase ankle strength for pt to take 3-4 steps toes/ heel walking without dropping no HHA  Increase SL balance to 20 secs B  - met 8/15/24  Increase glut strength one grade form 3/5 to get up from the floor without assist of furniture- depends 9/27/24  Pt will be able to reach for pots/ objects bottom shelf without using the counter to pull him  self up to standing  Improve TUG to 9 secs - met 8 seconds 9/20/24    Plan:     Please sign to cont with PT for LE and core strengthening. 1 x a week for a few more visits.    Will follow up with pt in a few  weeks. Will discharge at that time if appropriate.   Date: 8/15/2024  TX#: 2/8 Date:  8/22/24               TX#: 3/8 Date:    9/6/24           TX#: 4/8 Date:  9/12/24          TX#: 5/8 Date:9/18/24  Tx#: 6/8 7/8  9/20/24 8/8  9/25/24 1/4  9/27/24 2/4  10/1/24   Nustep 5 mins   Rocker board 10 x 2  Lateral side walking in bars   Front walking RTB  in bars   Rocker board 10 x 2  A/P holding in mid line   SL on foam pad 2# balance 10 x 2     Reformer B squats   SL only squats 10 x 2  Star 10 x each side   Squats in the bars 10 x   SL bridge 10 x each   SL balance in bars    Nustep 5 mins LE  Rocker board 10 x   SL rocker board 10 x   Red band lateral side walking  Front walking RTB   Ext RTB 10 x   1/2 foam pad - balance 10 sec   Ball side on foam pad 10 x   Side and anterior ball rolling   Bridge with hip abd 10 x  3 pulse   2 sets  SLR with pulse on way up and down 10 x each     HS stretch 2 x 30 secs B with strap        Nustep 5 mins LE  Lateral side walking GTB   Front walking GTB   Hip ext 10 x GTB  2 sets  Abiola disc 10 x each side ball roll   Taps SL 10 x   Chair taps 10 x each side   SLR 1/2 up and 1/2 down 10 x each   Bridge 10 x 1/2 up and down  SL abd/ rainbow 10 x   RTB   HS with belt 30 secs x 2 each side    Piriformis stretch 2 x 30 secs B      Nustep 5 mins   Lateral side walking  Front walking GTB     Hips ext 10 x 2   Bosu step ups 10 x each side  Side stepping bosu 10 x   Bosu 10 x bridge  Bridge with marching 10 x off bosu   Side lying rainbow RTB 10 x each side    10 x abd hip RTB   Over hurdles step over lateral and forward 10 x   Taps on hurdles with CGA 10 x 2  Nustep 5 mins LE only   Reformer 5 cords 10 x  2  SL disc circles with GTB 10 x each side  Lateral and front walking  1/2 tandem on  foam 10 secs x 3   Bridge with marching 10 x 2   SL bridge 10 x each side  SKTC 2 x 20 secs each side  LTR 10 x   Gastroc stretch 2 x 30 secs B      Nusteo 5 mins LE only   Foam roller rows 10 x 2 GTB   Bosu. Foam pad 10 x seconds x 3   Tandem foam pad 10 secs x 2   6 inch eccentric taps 10 x each side  Blue band clams, side lying abd and rainbows 10 x each B  HS stretch 2 x 30 secs    Marching in bridge 10 x 2  LTR 10 x   Stephen stretch 2 x 30 secs    Off plinth      Elliptical 2 mins in each directions   Step ups 10 on each side bosu   Cone taps I nbars no HHA B 10 x   Lunge side ways 10 x 2   Reformer 10 x 2 4 cords squats SL   SL ball toss in bars blue ball 10 x  Bridge marching 6 x 4  Side lying abd/ clams   Bird dog 10 x   Donkey kick 10 x gTB   Fire hydrants GTB 10 x 2    Nustep 4 mins LE   Green band around the feet - hip flexor on the wall 10 x B   10 x R GTB   Taps on cones R 10 x   GTB abd 5 x  in bars 2 reps with moving abduction  Bridge 10 x   Side lying R abd /clams GTB 2 sets  Piriformis B stretch   HS stretch 2 x 30 secs B   Roller R quad/ hip flexor R only 4 mins        Nustep 5 mins LE only  Green band 10 x mountain climber  2 sets  Blue band lateral side walking 2 x each   Front walking Blue band   Blue hip ext 10x    Bosu step 10 x step ups   6 inch 10 x 2 quad eccentric sets with HHA      Mini squats 10 x  with HHA in bars   Bridge 5# 10 x 2 with toes laterally out   Or blue band with abd in bridge 3 count pulse 10 x   SLR / Abd in side lying clams Blue band 10 x each B   HS stretch with band 2 x 30 secs eacb                                           HEP:  see above , zaid , bridges, HS stretch     Charges:  EX 3       Total Timed Treatment: 45 min  Total Treatment Time: 45 min

## 2024-10-29 ENCOUNTER — OFFICE VISIT (OUTPATIENT)
Dept: PHYSICAL THERAPY | Age: 64
End: 2024-10-29
Attending: Other
Payer: COMMERCIAL

## 2024-10-29 PROCEDURE — 97110 THERAPEUTIC EXERCISES: CPT

## 2024-10-29 NOTE — PROGRESS NOTES
Diagnosis:    Multiple sclerosis (HCC) (G35)  Leg weakness (R29.898)  Hand weakness (R29.898)  Imbalance (R26.89)      Referring Provider: No ref. provider found  Date of Evaluation:    8/13/24    Precautions:  MS  Lumbar fusion        R hip replacement 2021 Next MD visit:   none scheduled  Date of Surgery: lumbar fusion 12/12/23    Insurance Primary/Secondary: [employer] / N/A     # Auth Visits: 8           Discharge Summary  Pt has attended 11 visits in Physical Therapy.      Subjective:  Pt is feeling good, no falls or tripping, no foot drop.   Pain: 0 /10 low back        Objective:   Strength/MMT: (* denotes performed with pain)  Hip Knee Foot/Ankle   Flexion: R 5/5; L 5/5  Extension: R 4+/5; L 4+/5  Abduction: R 4/5; L 4/5  ER: R 4/5; L 4/5  IR: R 4/5; L 4/5 Flexion: R 5/5; L 5/5  Extension: R 5/5; L 5/5    DF: R 4/5; L 4/5  PF: R 4/5; L 4/5  INV: R 5/5; L 5/5  EV: R 4/5; L 4/5  Great toe ext: R NA/5; L NA/5     SL balance - 28 secs,   L side   R side  26 secs  TUG 8 seconds today     Assessment:    pt has met his goals at this time. Will cont with his HEP at this time. Improved strength B in hips today with re assessment   Review of HEP today     Goals:   Pt will be I with HEP  Increase hip ER - pt weak, Passively he has full ROM- full AROM to get into figure 4 position, cross his legs  Pt will report walking his one mile without increase in hip tightness-  can still occur towards end of  the Mile on the R or walking to his corner.  10 /1/24  Increase ankle strength for pt to take 3-4 steps toes/ heel walking without dropping no HHA  Increase SL balance to 20 secs B  - met 8/15/24  Increase glut strength one grade form 3/5 to get up from the floor without assist of furniture- depends 9/27/24  Pt will be able to reach for pots/ objects bottom shelf without using the counter to pull him self up to standing- depends on the day and what position he is in or how low- 10/29/24. Completely on the floor  will require assist. Getting a pot of pan bottom shelf- met 10/29.24  Improve TUG to 9 secs - met 8 seconds 9/20/24    Plan:      Discharge with HEP   Date: 8/15/2024  TX#: 2/8 Date:  8/22/24               TX#: 3/8 Date:    9/6/24           TX#: 4/8 Date:  9/12/24          TX#: 5/8 Date:9/18/24  Tx#: 6/8 7/8 9/20/24 8/8 9/25/24 1/4 9/27/24 2/4  10/1/24 3/4  10/29/24   Nustep 5 mins   Rocker board 10 x 2  Lateral side walking in bars   Front walking RTB  in bars   Rocker board 10 x 2  A/P holding in mid line   SL on foam pad 2# balance 10 x 2     Reformer B squats   SL only squats 10 x 2  Star 10 x each side   Squats in the bars 10 x   SL bridge 10 x each   SL balance in bars    Nustep 5 mins LE  Rocker board 10 x   SL rocker board 10 x   Red band lateral side walking  Front walking RTB   Ext RTB 10 x   1/2 foam pad - balance 10 sec   Ball side on foam pad 10 x   Side and anterior ball rolling   Bridge with hip abd 10 x  3 pulse   2 sets  SLR with pulse on way up and down 10 x each     HS stretch 2 x 30 secs B with strap        Nustep 5 mins LE  Lateral side walking GTB   Front walking GTB   Hip ext 10 x GTB  2 sets  Abiola disc 10 x each side ball roll   Taps SL 10 x   Chair taps 10 x each side   SLR 1/2 up and 1/2 down 10 x each   Bridge 10 x 1/2 up and down  SL abd/ rainbow 10 x   RTB   HS with belt 30 secs x 2 each side    Piriformis stretch 2 x 30 secs B      Nustep 5 mins   Lateral side walking  Front walking GTB     Hips ext 10 x 2   Bosu step ups 10 x each side  Side stepping bosu 10 x   Bosu 10 x bridge  Bridge with marching 10 x off bosu   Side lying rainbow RTB 10 x each side    10 x abd hip RTB   Over hurdles step over lateral and forward 10 x   Taps on hurdles with CGA 10 x 2  Nustep 5 mins LE only   Reformer 5 cords 10 x  2  SL disc circles with GTB 10 x each side  Lateral and front walking  1/2 tandem on foam 10 secs x 3   Bridge with marching 10 x 2   SL bridge 10 x each side  SKTC 2 x 20 secs each  side  LTR 10 x   Gastroc stretch 2 x 30 secs B      Nusteo 5 mins LE only   Foam roller rows 10 x 2 GTB   Bosu. Foam pad 10 x seconds x 3   Tandem foam pad 10 secs x 2   6 inch eccentric taps 10 x each side  Blue band clams, side lying abd and rainbows 10 x each B  HS stretch 2 x 30 secs    Marching in bridge 10 x 2  LTR 10 x   Stephen stretch 2 x 30 secs    Off plinth      Elliptical 2 mins in each directions   Step ups 10 on each side bosu   Cone taps I nbars no HHA B 10 x   Lunge side ways 10 x 2   Reformer 10 x 2 4 cords squats SL   SL ball toss in bars blue ball 10 x  Bridge marching 6 x 4  Side lying abd/ clams   Bird dog 10 x   Donkey kick 10 x gTB   Fire hydrants GTB 10 x 2    Nustep 4 mins LE   Green band around the feet - hip flexor on the wall 10 x B   10 x R GTB   Taps on cones R 10 x   GTB abd 5 x  in bars 2 reps with moving abduction  Bridge 10 x   Side lying R abd /clams GTB 2 sets  Piriformis B stretch   HS stretch 2 x 30 secs B   Roller R quad/ hip flexor R only 4 mins        Nustep 5 mins LE only  Green band 10 x mountain climber  2 sets  Blue band lateral side walking 2 x each   Front walking Blue band   Blue hip ext 10x    Bosu step 10 x step ups   6 inch 10 x 2 quad eccentric sets with HHA      Mini squats 10 x  with HHA in bars   Bridge 5# 10 x 2 with toes laterally out   Or blue band with abd in bridge 3 count pulse 10 x   SLR / Abd in side lying clams Blue band 10 x each B   HS stretch with band 2 x 30 secs eacb        Nustep 5 mins   Step ups on 6 inch 10 x each   Lateral side stepping 6 inch 10 x   Blue band lateral and front walking   Re assessment 10 mins today   Bridge on heels today 10 x slowing down exercises.     Bridge with marching 10 x 3 sets    HS stretch 3 x 30 secs B   LTR 10 x each                                            HEP:  see above , zaid , bridges, HS stretch     Charges:  EX 3       Total Timed Treatment: 45 min  Total Treatment Time: 45 min

## 2024-12-06 DIAGNOSIS — E78.5 DYSLIPIDEMIA: ICD-10-CM

## 2024-12-07 DIAGNOSIS — E78.5 DYSLIPIDEMIA: ICD-10-CM

## 2024-12-11 RX ORDER — PRAVASTATIN SODIUM 80 MG/1
80 TABLET ORAL DAILY
Qty: 90 TABLET | Refills: 3 | Status: SHIPPED | OUTPATIENT
Start: 2024-12-11

## 2024-12-11 NOTE — TELEPHONE ENCOUNTER
Refill passed per [facility] protocol.    Requested Prescriptions   Pending Prescriptions Disp Refills    PRAVASTATIN SODIUM 80 MG Oral Tab [Pharmacy Med Name: Pravastatin Sodium 80 MG Oral Tablet] 90 tablet 3     Sig: TAKE 1 TABLET BY MOUTH DAILY       Cholesterol Medication Protocol Passed - 12/11/2024  3:19 PM        Passed - ALT < 80     Lab Results   Component Value Date    ALT 11 01/26/2024             Passed - ALT resulted within past year        Passed - Lipid panel within past 12 months     Lab Results   Component Value Date    CHOLEST 160 01/26/2024    TRIG 233 01/26/2024    HDL 18 01/26/2024    LDL 84 11/20/2023    VLDL 38 (H) 11/20/2023    TCHDLRATIO 8.90 01/26/2024    NONHDLC 124 11/20/2023             Passed - In person appointment or virtual visit in the past 12 mos or appointment in next 3 mos     Recent Outpatient Visits              1 month ago     [facility] [place], PT    Office Visit    2 months ago Actinic keratosis    DANIA VILLALOBOS Scott, MD    Office Visit    2 months ago     [facility] in [facility] [place], PT    Office Visit    2 months ago     [facility] [place], PT    Office Visit    2 months ago     [facility] [place], PT    Office Visit          Future Appointments         Provider Department Appt Notes    In 4 months Tavo Lynch MD [facility]                        Future Appointments         Provider Department Appt Notes    In 4 months Tavo Lynch MD [facility]           Recent Outpatient Visits              1 month ago     [facility] [place], PT    Office Visit    2 months ago Actinic keratosis    DANIA VILLALOBOS Scott, MD    Office Visit    2 months ago     [facility] in [facility]  [place], PT    Office Visit    2 months ago     [facility] [place], PT    Office Visit    2 months ago     [facility] [place], PT    Office Visit

## 2024-12-12 RX ORDER — FENOFIBRATE 145 MG/1
145 TABLET, COATED ORAL DAILY
Qty: 90 TABLET | Refills: 3 | Status: SHIPPED | OUTPATIENT
Start: 2024-12-12

## 2024-12-12 NOTE — TELEPHONE ENCOUNTER
Refill passed per [facility] protocol.  Requested Prescriptions   Pending Prescriptions Disp Refills    FENOFIBRATE 145 MG Oral Tab [Pharmacy Med Name: Fenofibrate 145 MG Oral Tablet] 90 tablet 3     Sig: TAKE 1 TABLET BY MOUTH DAILY       Cholesterol Medication Protocol Passed - 12/12/2024 10:11 AM        Passed - ALT < 80     Lab Results   Component Value Date    ALT 11 01/26/2024             Passed - ALT resulted within past year        Passed - Lipid panel within past 12 months     Lab Results   Component Value Date    CHOLEST 160 01/26/2024    TRIG 233 01/26/2024    HDL 18 01/26/2024    LDL 84 11/20/2023    VLDL 38 (H) 11/20/2023    TCHDLRATIO 8.90 01/26/2024    NONHDLC 124 11/20/2023             Passed - In person appointment or virtual visit in the past 12 mos or appointment in next 3 mos     Recent Outpatient Visits              1 month ago     [facility] [place], PT    Office Visit    2 months ago Actinic keratosis    [facility] LYNCH, Tavo Meadows MD    Office Visit    2 months ago     [facility] in [facility] [place], PT    Office Visit    2 months ago     [facility] [place], PT    Office Visit    2 months ago     [facility] [place], PT    Office Visit          Future Appointments         Provider Department Appt Notes    In 3 months Tavo Lynch MD [facility]                        Recent Outpatient Visits              1 month ago     [facility] [place], PT    Office Visit    2 months ago Actinic keratosis    MANUELITO LYNCH, Tavo Meadows MD    Office Visit    2 months ago     [facility] in [facility] [place], PT    Office Visit    2 months ago     [facility] [place], PT    Office Visit    2 months ago      [facility] [place], PT    Office Visit          Future Appointments         Provider Department Appt Notes    In 3 months Tavo Lynch MD [facility]

## 2025-01-28 ENCOUNTER — TELEPHONE (OUTPATIENT)
Dept: INTERNAL MEDICINE CLINIC | Facility: CLINIC | Age: 65
End: 2025-01-28

## 2025-01-28 NOTE — TELEPHONE ENCOUNTER
Laboratory evaluation ordered from outside provider reviewed.  Several abnormalities, however nothing critical.  Patient is overdue for annual physical examination.  Please schedule CPE for further discussion of laboratory evaluation.

## 2025-01-29 NOTE — TELEPHONE ENCOUNTER
Called and spoke with pt. Notified pt AD reviewed labs, several abnormalities, however nothing critical. Pt agreeable to schedule CPE.     Future Appointments   Date Time Provider Department Center   2/25/2025 10:20 AM Michael Borden MD [facility]   4/10/2025  8:00 AM [facility]

## 2025-02-18 RX ORDER — LOSARTAN POTASSIUM 100 MG/1
100 TABLET ORAL DAILY
Qty: 90 TABLET | Refills: 3 | Status: SHIPPED | OUTPATIENT
Start: 2025-02-18

## 2025-02-18 NOTE — TELEPHONE ENCOUNTER
Refill passed per [facility] protocol.    Outside labs done on 01/24/2025    GFR 68    Requested Prescriptions   Pending Prescriptions Disp Refills    LOSARTAN 100 MG Oral Tab [Pharmacy Med Name: Losartan Potassium 100 MG Oral Tablet] 90 tablet 3     Sig: TAKE 1 TABLET BY MOUTH DAILY       Hypertension Medications Protocol Failed - 2/18/2025  9:24 AM        Failed - CMP or BMP in past 12 months        Failed - EGFRCR or GFRNAA > 50     GFR Evaluation            Passed - Last BP reading less than 140/90     BP Readings from Last 1 Encounters:   07/03/24 134/70               Passed - In person appointment or virtual visit in the past 12 mos or appointment in next 3 mos     Recent Outpatient Visits              3 months ago     [facility] [place], PT    Office Visit    4 months ago Actinic keratosis    [facility] Tavo Meadows MD    Office Visit    4 months ago     [facility] [place], PT    Office Visit    4 months ago     [facility] [place], PT    Office Visit    4 months ago     [facility] [facility], PT    Office Visit          Future Appointments         Provider Department Appt Notes    In 1 week Michael Borden MD [facility], [address] [place] [address] Annual physical, last CPE in 2023    In 1 month Tavo Lynch MD [facility]                     Passed - Medication is active on med list           Future Appointments         Provider Department Appt Notes    In 1 week Michael Borden MD [facility], [address] [place] [address] Annual physical, last CPE in 2023    In 1 month Tavo Lynch MD [facility]           Recent Outpatient Visits              3 months ago     [facility], [place], PT    Office Visit    4 months ago Actinic  keratosis    [facility] Tavo Lynch MD    Office Visit    4 months ago     [facility]    [facility]    4 months [facility]    Office Visit    4 months ago     [facility], [place], PT    Office Visit

## 2025-02-25 ENCOUNTER — OFFICE VISIT (OUTPATIENT)
Dept: INTERNAL MEDICINE CLINIC | Facility: CLINIC | Age: 65
End: 2025-02-25
Payer: COMMERCIAL

## 2025-02-25 VITALS
OXYGEN SATURATION: 98 % | WEIGHT: 206.63 LBS | HEIGHT: 69.02 IN | SYSTOLIC BLOOD PRESSURE: 124 MMHG | HEART RATE: 71 BPM | BODY MASS INDEX: 30.61 KG/M2 | DIASTOLIC BLOOD PRESSURE: 62 MMHG

## 2025-02-25 DIAGNOSIS — Z98.1 S/P LUMBAR FUSION: ICD-10-CM

## 2025-02-25 DIAGNOSIS — K57.90 DIVERTICULOSIS: ICD-10-CM

## 2025-02-25 DIAGNOSIS — I10 PRIMARY HYPERTENSION: ICD-10-CM

## 2025-02-25 DIAGNOSIS — M48.061 SPINAL STENOSIS OF LUMBAR REGION WITHOUT NEUROGENIC CLAUDICATION: ICD-10-CM

## 2025-02-25 DIAGNOSIS — Z00.00 ANNUAL PHYSICAL EXAM: ICD-10-CM

## 2025-02-25 DIAGNOSIS — D64.9 NORMOCYTIC ANEMIA: Primary | ICD-10-CM

## 2025-02-25 DIAGNOSIS — E78.2 MIXED HYPERLIPIDEMIA: ICD-10-CM

## 2025-02-25 DIAGNOSIS — M16.11 PRIMARY OSTEOARTHRITIS OF RIGHT HIP: ICD-10-CM

## 2025-02-25 DIAGNOSIS — Z86.0100 HISTORY OF COLONIC POLYPS: ICD-10-CM

## 2025-02-25 DIAGNOSIS — G47.33 OSA ON CPAP: ICD-10-CM

## 2025-02-25 DIAGNOSIS — E53.8 FOLIC ACID DEFICIENCY: ICD-10-CM

## 2025-02-25 DIAGNOSIS — M51.16 DISPLACEMENT OF LUMBAR INTERVERTEBRAL DISC WITH RADICULOPATHY: ICD-10-CM

## 2025-02-25 DIAGNOSIS — D3A.021: ICD-10-CM

## 2025-02-25 DIAGNOSIS — N31.9 NEUROGENIC BLADDER: ICD-10-CM

## 2025-02-25 DIAGNOSIS — G35 MS (MULTIPLE SCLEROSIS) (HCC): ICD-10-CM

## 2025-02-25 DIAGNOSIS — N20.0 NEPHROLITHIASIS: ICD-10-CM

## 2025-02-25 DIAGNOSIS — E53.8 B12 DEFICIENCY: ICD-10-CM

## 2025-02-25 DIAGNOSIS — M48.061 LUMBAR FORAMINAL STENOSIS: ICD-10-CM

## 2025-02-25 PROBLEM — Z01.818 PRE-OP TESTING: Status: RESOLVED | Noted: 2023-12-07 | Resolved: 2025-02-25

## 2025-02-25 PROBLEM — M54.41 CHRONIC RIGHT-SIDED LOW BACK PAIN WITH RIGHT-SIDED SCIATICA: Status: RESOLVED | Noted: 2021-12-13 | Resolved: 2025-02-25

## 2025-02-25 PROBLEM — R06.00 DYSPNEA, UNSPECIFIED TYPE: Status: RESOLVED | Noted: 2018-07-11 | Resolved: 2025-02-25

## 2025-02-25 PROBLEM — G89.29 CHRONIC RIGHT-SIDED LOW BACK PAIN WITH RIGHT-SIDED SCIATICA: Status: RESOLVED | Noted: 2021-12-13 | Resolved: 2025-02-25

## 2025-02-25 PROBLEM — K64.8 INTERNAL HEMORRHOIDS: Status: RESOLVED | Noted: 2020-10-09 | Resolved: 2025-02-25

## 2025-02-25 PROBLEM — K80.31 CHOLANGITIS DUE TO BILE DUCT CALCULUS WITH OBSTRUCTION: Status: RESOLVED | Noted: 2023-05-03 | Resolved: 2025-02-25

## 2025-02-25 PROBLEM — D70.9 NEUTROPENIA: Status: RESOLVED | Noted: 2020-02-18 | Resolved: 2025-02-25

## 2025-02-25 PROCEDURE — 99397 PER PM REEVAL EST PAT 65+ YR: CPT | Performed by: INTERNAL MEDICINE

## 2025-02-25 NOTE — PROGRESS NOTES
Austyn Wynne  5/24/1960    Chief Complaint   Patient presents with    Physical     Room 6, AS, physical exam.       HPI:   Austyn Wynne is a 64 year old male who presents for an annual physical examination.    Since last evaluation the patient has maintained stable dietary habits, however secondary to the cooler outdoor temperatures during the winter season, he has not been undergoing his typical exercise regimen.  He reports overall resolution of symptoms of chronic back pain.  No right hip pain.  He has maintained compliance with NiPPV for management of obstructive sleep apnea.  He denies any acute concerns at this time.    Current Outpatient Medications   Medication Sig Dispense Refill    losartan 100 MG Oral Tab Take 1 tablet (100 mg total) by mouth daily. 90 tablet 3    fenofibrate 145 MG Oral Tab Take 1 tablet (145 mg total) by mouth daily. 90 tablet 3    Pravastatin Sodium 80 MG Oral Tab Take 1 tablet (80 mg total) by mouth daily. 90 tablet 3    amLODIPine 10 MG Oral Tab Take 1 tablet (10 mg total) by mouth daily. 90 tablet 3    furosemide 20 MG Oral Tab Take 1 tablet (20 mg total) by mouth daily as needed. 10 tablet 0    diphenhydrAMINE HCl (BENADRYL ALLERGY OR) Take 1 tablet by mouth one time. For Hibiclens.      traMADol 50 MG Oral Tab Take 1 tablet (50 mg total) by mouth every 8 (eight) hours as needed for Pain. 21 tablet 0    Acidophilus/Pectin Oral Cap Take 1 capsule by mouth daily as needed.      Calcium Carbonate Antacid (TUMS OR) Take 1 tablet by mouth daily as needed.      Omeprazole 40 MG Oral Capsule Delayed Release Take 1 capsule (40 mg total) by mouth daily. 30 capsule 0    folic acid 1 MG Oral Tab Take 1 tablet (1 mg total) by mouth daily.      Psyllium (METAMUCIL FIBER OR) Take 1 tablet by mouth daily.      Cholecalciferol (VITAMIN D) 2000 UNITS Oral Cap Take 1 capsule (2,000 Units total) by mouth daily.      Cyanocobalamin (VITAMIN B 12 OR) Take 1 tablet by mouth daily.         Teriflunomide 14 MG Oral Tab Take 14 mg by mouth daily. AUBAGIO       docusate sodium 100 MG Oral Cap Take 100 mg by mouth 2 (two) times daily. (Patient not taking: Reported on 2/25/2025) 30 capsule 0    methocarbamol 750 MG Oral Tab Take 1 tablet (750 mg total) by mouth 3 (three) times daily. (Patient not taking: Reported on 2/25/2025)      oxyCODONE-acetaminophen 5-325 MG Oral Tab Take 1-2 tablets by mouth every 4 to 6 hours. (Patient not taking: Reported on 2/25/2025)      omega-3 fatty acids 1000 MG Oral Cap Take 1,000 mg by mouth As Directed. (Patient not taking: Reported on 2/25/2025)        Allergies[1]   Past Medical History:    Calculus of kidney    Cancer (HCC)    colon    Carcinoid tumor (HCC)    cecal carcinoid    Chest pain    Colon polyps    Diverticulitis    left colon    Diverticulosis    Esophageal reflux    Fe deficiency anemia    Frequent urination    Believed to be side affect of MS    High blood pressure    High cholesterol    Multiple sclerosis (HCC)    Nephrolithiasis    Pain in joints    Hip pain...total hip replacement 6-2020    Pneumonia    Prediabetes    Diet control    Sleep apnea    Testicular lump    right    Visual impairment    glasses    Wears glasses    Glasses      Patient Active Problem List   Diagnosis    Diverticulitis    Colon polyps    CAROLINA on CPAP    Nephrolithiasis    Benign essential HTN    Fe deficiency anemia    MS (multiple sclerosis) (HCC)    B12 deficiency    Dyspnea, unspecified type    Chest pain with moderate risk for cardiac etiology    Carcinoid tumor of cecum (HCC)    Hyperlipidemia    Acute blood loss as cause of postoperative anemia    Incisional hernia    Anemia    Neutropenia    Primary osteoarthritis of right hip    Personal history of colonic polyps    Diverticulosis    Internal hemorrhoids    Chronic right-sided low back pain with right-sided sciatica    Lumbar foraminal stenosis    Displacement of lumbar intervertebral disc with radiculopathy    Neurogenic  bladder    Cholangitis due to bile duct calculus with obstruction    Transaminitis    Chronic anemia    Primary hypertension    Pre-op testing    S/P lumbar fusion    Spinal stenosis, lumbar      Past Surgical History:   Procedure Laterality Date    Appendectomy  2011    Removed in Colon cancer surgury    Bowel resection      Cholecystectomy      Colectomy   &     To remove colon cancer    Colonoscopy  2012    Colonoscopy  2015    since colon cancer have 1 every 3 years    Ercp,diagnostic      Fracture surgery      Fracture of right 4th finger repaired - no metal    Hernia surgery  2019    Abd.    Hip replacement surgery      right hip replacement    Laparoscopic cholecystectomy  2023    Robotic cholecystectomy    Laparoscopy, surgical; colectomy, partial, w/ anastomos  2011    Other surgical history  2019    Laparoscopic-assisted small bowel resection with anastomosis    Other surgical history  2019    Repair of incarcerated incisional hernia    Tonsillectomy      Vasectomy        Family History   Problem Relation Age of Onset    Diabetes Maternal Grandfather     Cancer Father         melanoma      Other (melanoma) Father         and pts uncle    Other (multiple sclerosis) Mother     Other (Other) Mother         MS    Other Mother         MS    Other (breast cancer) Maternal Grandmother     Other (heart attack) Paternal Grandmother 70      Social History     Socioeconomic History    Marital status:    Tobacco Use    Smoking status: Never    Smokeless tobacco: Never   Vaping Use    Vaping status: Never Used   Substance and Sexual Activity    Alcohol use: Yes     Alcohol/week: 2.0 - 3.0 standard drinks of alcohol     Types: 2 - 3 Cans of beer per week     Comment: 2 to 3 beers a week    Drug use: No   Other Topics Concern    Caffeine Concern Yes    Stress Concern No    Weight Concern Yes     Comment: about 15 to 20 pounds over weight    Special Diet No     Exercise Yes    Seat Belt Yes     Social Drivers of Health     Food Insecurity: No Food Insecurity (12/12/2023)    Food Insecurity     Food Insecurity: Never true   Transportation Needs: No Transportation Needs (12/12/2023)    Transportation Needs     Lack of Transportation: No   Housing Stability: Low Risk  (12/12/2023)    Housing Stability     Housing Instability: No         REVIEW OF SYSTEMS:   GENERAL: feels well otherwise  SKIN: no rashes  EYES:denies blurred vision or double vision  HEENT: Not congested  LUNGS: denies shortness of breath with exertion  CARDIOVASCULAR: denies chest pain on exertion  GI: no nausea or abdominal pain  NEURO: denies headaches    EXAM:   /62 (BP Location: Left arm, Patient Position: Sitting, Cuff Size: adult)   Pulse 71   Ht 5' 9.02\" (1.753 m)   Wt 206 lb 9.6 oz (93.7 kg)   SpO2 98%   BMI 30.50 kg/m²   GENERAL: Well developed, well nourished,in no apparent distress  SKIN: No rashes,no suspicious lesions  EYES: bilateral conjunctiva are clear  HEENT: atraumatic, normocephalic. TM WNL BL.  NECK: supple,no adenopathy,no bruits  LUNGS: clear to auscultation  CARDIO: RRR without murmur  GI: good BS's,no masses, HSM or tenderness    ASSESSMENT AND PLAN:   Austyn Wynne is a 64 year old male who presents for an annual physical examination.    Outstanding screening and preventive measures:   Shingles immunization: Advised to obtain from pharmacy    Normocytic anemia:  Iron studies, vitamin B12 and folic acid levels ordered, however B12 and folic acid levels have historically been normal despite anemia.  History of colon cancer, post colectomy; concern for malabsorption of iron versus recurrence.    Chronic right-sided low back pain with right sciatica, lumbar foraminal stenosis, and displacement of lumbar intervertebral disc with radiculopathy:  Reportedly resolution of symptoms following spinal fusion  No focal neurological deficits     R-R MS:  Stable for several  years  Taking Aubagio  Neurogenic bladder: Continue oxybutynin  Diagnosed ~2015 w left sided weakness  No current symptoms  Follows with neurology service Q6mo at [facility]  Continue current management per neurology service     Hypertension:   Controlled  DASH recommendations reinforced  Continue losartan 100 mg daily and amlodipine 10 mg daily     H/o carcinoid colon cancer:  Post colectomy  Follows with oncology service     Mixed hyperlipidemia:  Stable/controlled  Continue pravastatin 80 mg daily and fenofibrate 145 mg daily     CAROLINA:  Asymptomatic  Toleraing NiPPV nightly     Abdominal incisional hernia:  Asymptomatic     Primary OA of right hip:  Post replacement  Reportedly asymptomatic  Following with ortho service as needed    The patient indicates understanding of these issues and agrees to the plan.    TODAY'S ORDERS     No orders of the defined types were placed in this encounter.      Meds & Refills:  Requested Prescriptions      No prescriptions requested or ordered in this encounter       Imaging & Consults:  None    No follow-ups on file.  There are no Patient Instructions on file for this visit.    All questions were answered and the patient agrees with the plan.     Thank you,  Michael Borden MD         [1] No Known Allergies

## 2025-07-11 RX ORDER — AMLODIPINE BESYLATE 10 MG/1
10 TABLET ORAL DAILY
Qty: 90 TABLET | Refills: 3 | Status: SHIPPED | OUTPATIENT
Start: 2025-07-11

## 2025-07-11 NOTE — TELEPHONE ENCOUNTER
Please review .protocol failed/ has no protocol    Last Office Visit: 02/25/2025  Please advise if refill is appropriate.

## (undated) DIAGNOSIS — D64.9 NORMOCYTIC ANEMIA: Primary | ICD-10-CM

## (undated) DIAGNOSIS — E78.5 DYSLIPIDEMIA: ICD-10-CM

## (undated) DEVICE — HOWELL D.A.S.H. DIRECT ACCESS SYSTEM: Brand: D.A.S.H.

## (undated) DEVICE — BALLOON HEMOSTATIC EUS LINEAR

## (undated) DEVICE — WRAP COOLING HIP W/ICE PILLOW

## (undated) DEVICE — CADIERE FORCEPS: Brand: ENDOWRIST

## (undated) DEVICE — SEAL

## (undated) DEVICE — #15 STERILE STAINLESS BLADE: Brand: STERILE STAINLESS BLADES

## (undated) DEVICE — SOL  .9 1000ML BTL

## (undated) DEVICE — FUSION WIRE GUIDE LOCKING DEVICE: Brand: FUSION

## (undated) DEVICE — 5 MM BABCOCKS WITH RATCHET HANDLES: Brand: ENDOPATH

## (undated) DEVICE — [HIGH FLOW INSUFFLATOR,  DO NOT USE IF PACKAGE IS DAMAGED,  KEEP DRY,  KEEP AWAY FROM SUNLIGHT,  PROTECT FROM HEAT AND RADIOACTIVE SOURCES.]: Brand: PNEUMOSURE

## (undated) DEVICE — 40580 - THE PINK PAD - ADVANCED TRENDELENBURG POSITIONING KIT: Brand: 40580 - THE PINK PAD - ADVANCED TRENDELENBURG POSITIONING KIT

## (undated) DEVICE — STRYKER HARMONIC 36CM REPRCSED

## (undated) DEVICE — SUTURE VICRYL 3-0 SH

## (undated) DEVICE — VIOLET BRAIDED (POLYGLACTIN 910), SYNTHETIC ABSORBABLE SUTURE: Brand: COATED VICRYL

## (undated) DEVICE — 2, DISPOSABLE SUCTION/IRRIGATOR WITHOUT DISPOSABLE TIP: Brand: STRYKEFLOW

## (undated) DEVICE — BLOCK BITE MAXI 60FR

## (undated) DEVICE — PILLOW ABDUCTION HIP SM

## (undated) DEVICE — GOWN,SIRUS,FABRIC-REINFORCED,X-LARGE: Brand: MEDLINE

## (undated) DEVICE — 3M™ STERI-STRIP™ REINFORCED ADHESIVE SKIN CLOSURES, R1547, 1/2 IN X 4 IN (12 MM X 100 MM), 6 STRIPS/ENVELOPE: Brand: 3M™ STERI-STRIP™

## (undated) DEVICE — HIPEC PACK: Brand: MEDLINE INDUSTRIES, INC.

## (undated) DEVICE — FUSION OMNI-TOME SPHINCTEROTOME: Brand: FUSION

## (undated) DEVICE — SINGLE USE DISTAL COVER MAJ-2315: Brand: SINGLE USE DISTAL COVER

## (undated) DEVICE — COLUMN DRAPE

## (undated) DEVICE — MEDI-VAC YANKAUER SUCTION HANDLE W/BULBOUS TIP: Brand: CARDINAL HEALTH

## (undated) DEVICE — ANTERIOR HIP: Brand: MEDLINE INDUSTRIES, INC.

## (undated) DEVICE — SUT MONOCRYL 4-0 PS-2 Y496G

## (undated) DEVICE — Device: Brand: JELCO

## (undated) DEVICE — STERILE SYNTHETIC POLYISOPRENE POWDER-FREE SURGICAL GLOVES WITH HYDROGEL COATING: Brand: PROTEXIS

## (undated) DEVICE — REDUCER: Brand: ENDOWRIST

## (undated) DEVICE — ENDOPATH ULTRA VERESS INSUFFLATION NEEDLES WITH LUER LOCK CONNECTORS: Brand: ENDOPATH

## (undated) DEVICE — NEEDLE SPINAL 18X6 408360

## (undated) DEVICE — GLOVE SURG NEOLON SZ 7

## (undated) DEVICE — UNDYED BRAIDED (POLYGLACTIN 910), SYNTHETIC ABSORBABLE SUTURE: Brand: COATED VICRYL

## (undated) DEVICE — DRAPE,TAPE STRIPS,STERILE: Brand: MEDLINE

## (undated) DEVICE — TOWEL OR BLU 16X26 STRL

## (undated) DEVICE — KENDALL SCD EXPRESS SLEEVES, KNEE LENGTH, MEDIUM: Brand: KENDALL SCD

## (undated) DEVICE — KIT ENDO ORCAPOD 160/180/190

## (undated) DEVICE — REM POLYHESIVE ADULT PATIENT RETURN ELECTRODE: Brand: VALLEYLAB

## (undated) DEVICE — TROCARS: Brand: KII® BLUNT TIP ACCESS SYSTEM

## (undated) DEVICE — 3M™ IOBAN™ 2 ANTIMICROBIAL INCISE DRAPE 6650EZ: Brand: IOBAN™ 2

## (undated) DEVICE — LIGHT HANDLE

## (undated) DEVICE — ENDOSCOPY PACK - LOWER: Brand: MEDLINE INDUSTRIES, INC.

## (undated) DEVICE — SUTURE SILK 3-0 SH

## (undated) DEVICE — MEDI-VAC SUCTION HANDLE REGULAR CAPACITY: Brand: CARDINAL HEALTH

## (undated) DEVICE — COVER,MAYO STAND,STERILE: Brand: MEDLINE

## (undated) DEVICE — SPECIMEN CONTAINER,POSITIVE SEAL INDICATOR, OR PACKAGED: Brand: PRECISION

## (undated) DEVICE — SUTURE PDS LOOPED 60CM

## (undated) DEVICE — ENDOPATH ECHELON ENDOSCOPIC LINEAR CUTTER RELOADS, BLUE, 60MM: Brand: ECHELON ENDOPATH

## (undated) DEVICE — #11 STERILE BLADE: Brand: POLYMER COATED BLADES

## (undated) DEVICE — C-ARM: Brand: UNBRANDED

## (undated) DEVICE — SUTURE PROLENE 1 CT-1

## (undated) DEVICE — TROCAR: Brand: KII FIOS FIRST ENTRY

## (undated) DEVICE — ARM DRAPE

## (undated) DEVICE — OMNIPAQUE 300 POLYB 50ML

## (undated) DEVICE — CHLORAPREP 26ML APPLICATOR

## (undated) DEVICE — PAD SACRAL SPAN AID

## (undated) DEVICE — LARGE HEM-O-LOK CLIP APPLIER: Brand: ENDOWRIST

## (undated) DEVICE — DERMABOND CLOSURE 0.7ML TOPICL

## (undated) DEVICE — TROCAR: Brand: KII® SLEEVE

## (undated) DEVICE — POOLE SUCTION HANDLE: Brand: CARDINAL HEALTH

## (undated) DEVICE — ROBOTIC GENERAL: Brand: MEDLINE INDUSTRIES, INC.

## (undated) DEVICE — FENESTRATED BIPOLAR FORCEPS: Brand: ENDOWRIST

## (undated) DEVICE — ANCHOR TISSUE RETRIEVAL SYSTEM, BAG SIZE 125 ML, PORT SIZE 8 MM: Brand: ANCHOR TISSUE RETRIEVAL SYSTEM

## (undated) DEVICE — GOWN SURG AERO CHROME XXL

## (undated) DEVICE — MEDI-VAC NON-CONDUCTIVE SUCTION TUBING: Brand: CARDINAL HEALTH

## (undated) DEVICE — SUTURE VICRYL 2-0 CP-1

## (undated) DEVICE — SOLUTION ENDOSCOPIC ANTI-FOG NON-TOXIC NON-ABRASIVE 6 CUBIC CENTIMETER WITH RADIOPAQUE ADHESIVE-BACKED SPONGE STERILE NOT MADE WITH NATURAL RUBBER LATEX MEDICHOICE: Brand: MEDICHOICE

## (undated) DEVICE — SUTURE VICRYL 1 CPX

## (undated) DEVICE — SYRINGE 20CC LL TIP

## (undated) DEVICE — BIPOLAR SEALER 23-112-1 AQM 6.0: Brand: AQUAMANTYS™

## (undated) DEVICE — ENDOPATH 5MM ENDOSCOPIC BLUNT TIP DISSECTORS (12 POUCHES CONTAINING 3 DISSECTORS EACH): Brand: ENDOPATH

## (undated) DEVICE — TIGERTAIL 6F FLXTIP 70CM

## (undated) DEVICE — SUTURE PROLENE 3-0 SH

## (undated) DEVICE — LOCKING DEVICE RX & BIOPSY CAP

## (undated) DEVICE — DRAPE HALF 40X58 DYNJP2410

## (undated) DEVICE — SUTURE MONOCRYL 3-0 PS-2

## (undated) DEVICE — PERMANENT CAUTERY HOOK: Brand: ENDOWRIST

## (undated) DEVICE — ENDOPATH 5MM CURVED SCISSORS WITH MONOPOLAR CAUTERY: Brand: ENDOPATH

## (undated) DEVICE — PROXIMATE RELOADABLE LINEAR STAPLER, 30MM: Brand: PROXIMATE

## (undated) DEVICE — 1200CC GUARDIAN II: Brand: GUARDIAN

## (undated) DEVICE — POSITIONER OR KT PT CR

## (undated) DEVICE — SUTURE ETHIBOND 5 CCS

## (undated) DEVICE — Device: Brand: STABLECUT®

## (undated) DEVICE — NON-ADHERENT PAD PREPACK: Brand: TELFA

## (undated) DEVICE — BLADELESS OBTURATOR: Brand: WECK VISTA

## (undated) DEVICE — 10FT COMBINED O2 DELIVERY/CO2 MONITORING. FILTER WITH MICROSTREAM TYPE LUER: Brand: DUAL ADULT NASAL CANNULA

## (undated) DEVICE — CANNULA SEAL

## (undated) DEVICE — SUTURE SILK 2-0 SH

## (undated) DEVICE — 5.5MM ROUND FAST CUTTING BUR

## (undated) DEVICE — STERILE POLYISOPRENE POWDER-FREE SURGICAL GLOVES: Brand: PROTEXIS

## (undated) DEVICE — 3M™ RED DOT™ MONITORING ELECTRODE WITH FOAM TAPE AND STICKY GEL, 50/BAG, 20/CASE, 72/PLT 2570: Brand: RED DOT™

## (undated) DEVICE — ECHELON FLEX POWERED PLUS ARTICULATING ENDOSCOPIC LINEAR CUTTER , 60MM: Brand: ECHELON FLEX

## (undated) DEVICE — SUT PDS II 0 CT-2 Z334H

## (undated) DEVICE — SUTURE VICRYL 0 UR-6

## (undated) DEVICE — WOUND RETRACTOR AND PROTECTOR: Brand: ALEXIS O WOUND PROTECTOR-RETRACTOR

## (undated) DEVICE — Device: Brand: BOOT LINER, DISPOSABLE

## (undated) DEVICE — CLIP HEMOLOK LARGE PURPLE

## (undated) DEVICE — ZIPWIRE GUIDEWIRE 035X150 STR

## (undated) DEVICE — STERILE POLYISOPRENE POWDER-FREE SURGICAL GLOVES WITH EMOLLIENT COATING: Brand: PROTEXIS

## (undated) DEVICE — SUTURE MONOCRYL 4-0 PS-2

## (undated) NOTE — ED AVS SNAPSHOT
Andrea Mckeon   MRN: [id number]    Department:  [facility] Emergency Department   Date of Visit:  3/5/2019           Disclosure     Insurance plans vary and the physician(s) referred by the ER may not be covered by your plan.  Please contact your tell this physician (or your personal doctor if your instructions are to return to your personal doctor) about any new or lasting problems. The primary care or specialist physician will see patients referred from the [facility] Emergency Department.  Eli Taylor

## (undated) NOTE — IP AVS SNAPSHOT
Patient Demographics     Address  [address] [place] [address] Phone  [phone] [facility])  [phone] (Work)  [phone] (Mobile) *Preferred* E-mail Address  [employer]      Emergency Contact(s)     Name Relation Home Work o Remove entire wrapping and old dressing (if Medipore/coverlet) after showering. Pat dry with a CLEAN TOWEL if necessary and cover incision with new Medipore/coverlet. For other types of dressings, follow surgeon’s orders.           MEDIPORE/COVERLET Anticoagulants = blood thinners (Xarelto, Eliquis, Lovenox, Coumadin or Aspirin)  ? Pill or shot form depending on what your physician orders. ? IF placed on Coumadin, you may also need lab work done for monitoring. ?  You will bleed easier and bruise ea advantage of everything available to your to help control you discomfort. ? Contact physician if discomfort does not respond to pain medication. Body changes  ? Constipation is common with the use of narcotics.   ? Eat fiber rich foods and drink plent the hospital; much better for you to catch developing problems and prevent them from becoming larger ones. ? ROMINA HOSE – IF ordered by your surgeon, wear these during the day and off at night. Surgeon will tell you when you don’t need them anymore. and stiffness. ? TEMPORARY HANDICAP PARKING APPLICATION  (good for 3-6 months)  – At Surgeon or PCP visit, request they fill out the form, then go to [facility] (only time you do not wait in a long line there). Some [facility] offices provide the same service.  Brynn Schaeffer Teena San MD In 2 weeks.     Specialty:  SURGERY, ORTHOPEDIC  Contact information:  [address] [place] [address] [phone]             Schedule an appointment as soon as possible for a visit with Agustina Smith MD.    Helen Lanier Take by mouth daily. Omega-3-acid Ethyl Esters 1 g Caps  Commonly known as:  LOVAZA  Next dose due: Tomorrow AM      Take 1 g by mouth daily.           Oxybutynin Chloride 5 MG Tabs  Commonly known as:  DITROPAN      Take 10 mg by mouth as needed [id number] apixaban (ELIQUIS) tab 2.5 mg 06/25/20 0537 Given      [id number] atorvastatin (LIPITOR) tab 20 mg 06/24/20 2055 Given      [id number] ceFAZolin sodium (ANCEF/KEFZOL) 2 GM/20ML premix IV syringe 2 g 06/24/20 1723 Given      [id number] ceFAZolin sod Ordering provider:  Pradeep Soto MD  06/24/20 2300 Resulting lab:  [address] [place] [facility])   Narrative: The following orders were created for panel order CBC WITH DIFFERENTIAL WITH PLATELET.   Procedure Per Dr. Brandon Sarabia, faxed notes from Pre-Op today, EKG and signed ACS NSQIP Surgical Risk Calcultor form to Dr. Jermaine Haywood MD.  Rec'd fax confirmation. Sent EKG and FORM to scan.   Holding a paper copy of all faxed documents at my desk in a folder labeled Dr. Combs Began analgesics, ibuprofen stopped working. Has been out of work since March. Has been riding his bike 5x/wk.  Has good energy, no chest pain with exertion, no dyspnea with exertions, no palpitations, no lightheadedness.      Surgical History:  Past Surgical Hi •  AMLODIPINE BESYLATE 10 MG Oral Tab, TAKE 1 TABLET BY MOUTH DAILY, Disp: 90 tablet, Rfl: 1  •  LOSARTAN POTASSIUM 50 MG Oral Tab, TAKE 1 TABLET BY MOUTH AT BEDTIME, Disp: 90 tablet, Rfl: 1  •  folic acid 1 MG Oral Tab, Take by mouth daily. , Disp: , Rfl: nausea/vomiting, no diarrhea/constipation  Genitourinary:  No dysuria, no hematuria. Has incontinence related to MS. Musculoskeletal: No muscle aches  Skin: No skin rashes  Neurological:  Chronic R foot plantar surface numbness related to MS.   Hematologi Comp Metabolic Panel (14) [E]          Standing Status: Future          Standing Expiration Date: 6/4/2021      Prothrombin Time (PT) [E]          Standing Status: Future          Standing Expiration Date: 6/4/2021      PTT, Activated [E]          Naoma Carson SpO2 96%    BMI 26.31 kg/m²    BSA 2.1 m²    Flowsheets:    MU Functional Status,    DMG TEMP FOR BP BPA COMPARE,    Energy Needs       Encounter Info:    Billing Info,    History,    Allergies,    Detailed Report       Media     Scan on 6/4/2020 12:00 AM PMHX of colon CA, GERD, HTN, DL, MS, prediabetes, [place], OA. He has been admitted for right total hip replacement. Hospitalist has been consulted for help with medical management. He currently has some c/o pain but not severe. No other complaints.      Pas • Diabetes Maternal Grandfather    • Cancer Father         melanoma  18   • Other (melanoma) Father         and pts uncle   • Other (multiple sclerosis) Mother    • Other (Other) Mother         MS   • Other Mother         MS   • Other (breast cancer) HEENT: Normocephalic atraumatic. Moist mucous membranes. EOM-I. PERRLA. Anicteric. Neck: No lymphadenopathy. No JVD. No carotid bruits. Respiratory: Clear to auscultation bilaterally. No wheezes. No rhonchi.   Cardiovascular: S1, S2. Regular rate and rhyt Therapy Assistant    Filed:  6/25/2020  2:15 PM Date of Service:  6/25/2020  2:09 PM Status:  Signed    :  Mary Lou Sosa PTA (Physical Therapy Assistant)       PHYSICAL THERAPY HIP TREATMENT NOTE - INPATIENT      Room Number: 248/160-S     YYR Patient’s self-stated goal is return to walking and biking     OBJECTIVE  Precautions: None(no restrictions, per orders)    WEIGHT BEARING RESTRICTION  Weight Bearing Restriction: R lower extremity        R Lower Extremity: Weight Bearing as Tolerated D/w spouse and pt to trouble shoot how to safely t/f into [employer] truck. Pt was able to t/f to stoop c RW and CGA backwards after demo from [address] [place] [address]. Pt also performed stoop training forward c RW for safe entry into home.   Pt performed stair training c B Goal #2  Patient is able to demonstrate transfers Sit to/from Stand at assistance level: supervison      Goal #3     Patient is able to ambulate 200 feet with assistive device at assistance level: supervision   Goal #4     Patient will negotiate 4 stairs/o Past Surgical History  Past Surgical History:   Procedure Laterality Date   • APPENDECTOMY  2/2011    Removed in Colon cancer surgury   • BOWEL RESECTION     • COLONOSCOPY  01/31/2012   • COLONOSCOPY  1/2015    since colon cancer have 1 every 3 years   • E Upper extremity ROM and strength are within functional limits     Lower extremity ROM is within functional limits except limited R hip flex/ext    Lower extremity strength is within functional limits except for the following:    Right Hip flexion  3+/5  Ri ambulated 30ft with RW and CGA for balance/safety. Pt given VC for sequencing, posture, and walker management. Pt reports mild lightheadedness after 15ft and returned to bedside chair. BP stable in chair and patient notes improvement in symptoms.  Pt educat education;Gait training;Neuromuscular re-educate;Range of motion;Strengthening;Stair training;Transfer training;Balance training  Rehab Potential : Good  Frequency (Obs): Daily  Number of Visits to Meet Established Goals: 3      CURRENT GOALS  Goal #1  Laretta Runner Problem List[BW. 1]  Active Problems:    Benign essential HTN    Primary osteoarthritis of right hip[BW.2]      Past Medical History[BW.1]  Past Medical History:   Diagnosis Date   • Calculus of kidney 1980   • Cancer ([facility] [address] 2011    colon   • Carcinoid tumor functional mobility without device prior to admission. 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     SUBJECTIVE  \"The pain isn't too bad\"    OBJECTIVE[BW.1]  Precautions: None(no restrictio ADLs; patient required min cueing to follow throughout session; education on bed mobility, performed Mod I; education on LB dressing techniques/equipment, performed LB dressing to knees SBA[BW.1] without AE[BW.2], to waist SBA for balance in standing; UB d Client Assessment/Performance Deficits  LOW - No comorbidities nor modifications of tasks    Clinical Decision Making  LOW - Analysis of occupational profile, problem-focused assessments, limited treatment options    Overall Complexity  LOW[BW.1]     JOYCELYN Salguero [address] Na    7/16/2020 11:30 AM Sharon Vegas, PT [facility] in [address] [place] [address] Na    7/21/2020 11:30 AM Sharon Kwan, PT [facility] in [address] [place] [address] Na    7/23/2020 11:30 AM Belkis Kuhn - THERAPY, PAIN MEDS, STAFF ASSIST  - See additional Care Plan goals for specific interventions

## (undated) NOTE — LETTER
Cheryl Mack, MD Adilson English, MD Ronni Mccauley, MD Dimas Amado, MD Araceli Lenz, FNP      Requirements for Pre-Operative Clearance Requests  **All Fields Must Be Completed**      Date:  5/28/20    Dear SurgeonRoberto

## (undated) NOTE — LETTER
[address] [facility] Department  Phone: [phone]  Right Fax: [phone]  [facility] 20 By:  Rexene Boeck RN Date: 23    Patient Name: Al Hood  Surgery Date: 2023    CSN: [id number]  Medical Record: [id number]   : 1960 - A: 58 y      Sex: male    Surgeon(s):  Rexford Rushing, MD Cherry Hashimoto, MD    Procedure Comments: ANTERIOR LUMBAR INTERBODY FUSION LUMBAR 4 - LUMBAR 5, LUMBAR 5 - SACRAL 1  Anesthesia Type: General    Smartlink Surgeon Info:  Dari Jones MD  Phone Number: [phone]    To Surgeon: Dari Jones MD Fax #:  [phone]    [address] PAGES ([address])    PLEASE NOTE THE FOLLOWING ABNORMALITIES:   Hematology  from 23 ~ Low WBC's = 3.0  _______________________________________________________________    For your information    [address], [address] [facility]  Phone: [phone]  Fax: [phone]  [email or web address]    STATEMENT OF CONFIDENTIALITY: This transmittal is intended only for the use of the individual entity to which is addressed and may contain information that is privileged and confidential. information contained. If the reader of this message IS NOT the intended recipient, you are hereby notified that any disclosure, distribution or copying of this information is strictly prohibited. If you have received this transmission in error, please notify us immediately by telephone and return the original documents to us at the above address via the [facility]. Thank you.

## (undated) NOTE — LETTER
2/19/2019    Dear Dr. Jessica Morris would like to refer you Marquez Bell. Referring Provider: Lalita Tiwari NP    Fax: [phone]    As soon as the patient is seen please complete the form below and fax to the referring provider without a cover sheet.     Ex

## (undated) NOTE — LETTER
[facility] Testing Department  Phone: [phone]  OUTSIDE TESTING RESULT REQUEST    TO:   Dr. Jordan Clarke Date: 6/2/20    FAX #: [phone]    Patient has an appointment with you on 6/4/2020                             and will need t

## (undated) NOTE — MR AVS SNAPSHOT
[address]  [address] [place] [id number] [phone]               Thank you for choosing us for your health care visit with Marisa Lutz DO.   We are glad to serve you and happy to provide you with th Dysfunction. Commonly known as:  LEVITRA           VITAMIN B 12 OR   Take by mouth. Vitamin D 2000 UNITS Caps   Take by mouth.                 Where to Get Your Medications      These medications were sent to [place], [place] - 2 Make half your plate fruits and vegetables Highly refined, white starches including white bread, rice and pasta   Eat plenty of protein, keep the fat content low Sugars:  sodas and sports drinks, candies and desserts   Eat plenty of low-fat dairy products

## (undated) NOTE — MR AVS SNAPSHOT
After Visit Summary   4/24/2017    Jose Rivera    MRN: [id number]           Diagnoses this Visit     Carcinoid (except of appendix) ([facility]    -  Primary       Allergies     No Known Allergies      Your Vital Signs Were     BP Pulse Temp(Src) Resp Appointment with [address] at [facility] ([phone])   [address] [address]       Thursday July 20, 2017 10:30 AM     Appointment with Ross Vogt at [facility] in [place] (32

## (undated) NOTE — LETTER
8/21/2020    Dear Dr. Ari Park would like to refer you Panda Diaz.     Referring Provider: Naida Worthington MD    Fax: [phone]    As soon as the patient is seen please complete the form below and fax to the referring provider without a cover sheet

## (undated) NOTE — LETTER
MD Jonh Morales MD Grayland Calamity, MD Robin Norfolk, MD Charito Garcia, FNP      Requirements for Pre-Operative Clearance Requests  **All Fields Must Be Completed**      Date:  5/28/20    Dear SurgeonRoberto

## (undated) NOTE — LETTER
01/29/18        [facility] [address]    5/24/1960     Dear Sb Dawn,    [facility] records indicate that you have outstanding lab work and or testing that was ordered for you and has not yet been completed:        Lipid Pan

## (undated) NOTE — LETTER
[facility] Testing Department  Phone: [phone]  Right Fax: [phone]  [facility] 20 By: GUNJAN Figueroa RN Date: 20    Patient Name: Janeal Areas  Surgery Date: 2020    CSN: [id number]  Medical Record: [id number]

## (undated) NOTE — LETTER
07/30/20        [facility]      Dear Bennie Hinton,    [facility] records indicate that you have outstanding lab work and or testing that was ordered for you and has not yet been completed:        CBC Alto Brunner

## (undated) NOTE — LETTER
OUTSIDE TESTING RESULT REQUEST     IMPORTANT: FOR YOUR IMMEDIATE ATTENTION  Please FAX all test results listed below to: [phone]     Testing already done on or about: 23     * * * * If testing is NOT complete, arrange with patient A.S.A.P. * * * *      Patient Name: Jimmy Gonzalez  Surgery Date: 2023  Medical Record: [id number]  CSN: [id number]  : 1960 - A: 58 y     Sex: male  Surgeon(s):  MD Maryana Vasquez MD  Procedure: ANTERIOR LUMBAR INTERBODY FUSION LUMBAR 4 - LUMBAR 5, LUMBAR 5 - SACRAL 1  Anesthesia Type: General     Surgeon: Azeem Logan MD     The following Testing and Time Line are REQUIRED PER ANESTHESIA     EKG READ AND SIGNED WITHIN   90 days  CBC [with Differential & Platelets] within  90 days  CMP (requires 4 hour fast) within  90 days  PT/INR within  30 days  PTT within  30 days  UA within  30 days      Thank You,   Sent by:Meli WHITE

## (undated) NOTE — Clinical Note
ERICI, TCM call made, see notes. RADU confirmed patient has a post-op visit with Devin Pierce NP on 2/27/19 which is outside the TCM HFU timeframe.  RADU attempted to reschedule patient states he will call to reschedule as he needs to check with his wife when she

## (undated) NOTE — ED AVS SNAPSHOT
Javier Marie   MRN: [id number]    Department:  [facility] Emergency Department   Date of Visit:  4/29/2019           Disclosure     Insurance plans vary and the physician(s) referred by the ER may not be covered by your plan.  Please contact your tell this physician (or your personal doctor if your instructions are to return to your personal doctor) about any new or lasting problems. The primary care or specialist physician will see patients referred from the [facility] Emergency Department.  Gauri Ling

## (undated) NOTE — MR AVS SNAPSHOT
After Visit Summary   3/9/2017    Ministerio Layton    MRN: [id number]           Diagnoses this Visit     Decreased libido    -  Primary       Allergies     No Known Allergies      Your Vital Signs Were     Smoking Status                   Never Smoker

## (undated) NOTE — MR AVS SNAPSHOT
After Visit Summary   1/29/2021    Rusty Kendall    MRN: [id number]           Visit Information     Date & Time  1/29/2021  2:00 PM Provider  Hank Payan MD Department  [facility] in [place] [address].  Phone  [phone] WITH DIFFERENTIAL WITH PLATELET [[id number] CUSTOM]     CHROMOSOME, BONE MARROW [[id number] CUSTOM]     LEUKEMIA LYMPHOMA FLOW NONBLD [[id number] CUSTOM]     LEUKEMIA\LYMPHOMA NON BLD (P) [[id number] CUSTOM]     SURGICAL PATHOLOGY TISSUE [[id number] CUSTOM]     Futu A small amount of bleeding is normal.  However, if bleeding soaks through the bandage or doesn't stop with direct pressure, contact your health care team as soon as possible.     Other situations in which to contact your health care team include:    Develop AFTER HOURS CARE  [place]  OFFICE VISIT   Primary Care Providers  Treatment for mild illness or injury that does not require immediate attention.  Average cost  $70*   [facility] Vivek Viveros  Monday – Friday  8:00 am – 8:00 pm   S

## (undated) NOTE — LETTER
Makayla Voss [address] [facility]  Jaylene, [address] [facility]    Consent for Operation  Date: __________________                                Time: _______________    1.  I authorize the performance upon Gaurav Nickerson the following operation:  Procedure( revealed by the pictures or by descriptive texts accompanying them. If the procedure has been videotaped, the surgeon will obtain the original videotape. The hospital will not be responsible for storage or maintenance of this tape.   7. For the purpose of a THAT MY DOCTOR PROVIDED ME WITH THE ABOVE EXPLANATIONS, THAT ALL BLANKS OR STATEMENTS REQUIRING INSERTION OR COMPLETION WERE FILLED IN.     Signature of Patient:   ___________________________    When the patient is a minor or mentally incompetent to give co iii. All of the medicines I take (including prescriptions, herbal supplements, and pills I can buy without a prescription (including street drugs/illegal medications).  Failure to inform my anesthesiologist about these medicines may increase my risk of anes _____________________________________________________________________________  Anesthesiologist Signature     Date   Time  I have discussed the procedure and information above with the patient (or patient’s representative) and answered their questions.  The

## (undated) NOTE — Clinical Note
TCM call complete. Pt declined HFU appt with PCP for now but is going to see the Surgeon. Pt is doing well. Thank you.

## (undated) NOTE — LETTER
[address], [address] [place] [address]  [facility]: [phone]  FAX: [phone]    Medical Clearance Request  1-14-19    Johnna Friends, NP  [address] [facility]  [employer]  [address]  [facility]

## (undated) NOTE — MR AVS SNAPSHOT
After Visit Summary   3/9/2017    Marietta Blum    MRN: [id number]           Visit Information        Provider Department Dept Phone    3/9/2017  9:55 AM Outpatient Oncology Tech [facility] [phone]      Allergies as of 3/9/2017  Reviewe complete it and provide feedback. We strive to deliver the best patient experience and are looking for ways to make improvements. Your feedback will help us do so. For more information on [employer], please visit [email or web address]

## (undated) NOTE — LETTER
Apple Osei, MD Johnna Jefferson, MD Gerson Flores, MD Deangelo Wiggins, MD Norma Easley, FNP      Requirements for Pre-Operative Clearance Requests  **All Fields Must Be Completed**      Date:  5/28/20    Dear SurgeonRoberto